# Patient Record
Sex: MALE | Race: WHITE | NOT HISPANIC OR LATINO | Employment: PART TIME | URBAN - METROPOLITAN AREA
[De-identification: names, ages, dates, MRNs, and addresses within clinical notes are randomized per-mention and may not be internally consistent; named-entity substitution may affect disease eponyms.]

---

## 2017-03-24 ENCOUNTER — HOSPITAL ENCOUNTER (OUTPATIENT)
Dept: LAB | Facility: HOSPITAL | Age: 52
Setting detail: SPECIMEN
Discharge: HOME | End: 2017-03-24
Attending: INTERNAL MEDICINE | Admitting: INTERNAL MEDICINE

## 2017-03-24 LAB
ALBUMIN SERPL-MCNC: 3.8 G/DL (ref 3.5–4.8)
ALBUMIN/GLOB SERPL: 1.2 {RATIO} (ref 1–1.7)
ALP SERPL-CCNC: 59 IU/L (ref 32–91)
ALT SERPL-CCNC: 20 IU/L (ref 17–63)
ANION GAP SERPL CALC-SCNC: 14.7 MMOL/L (ref 10–20)
AST SERPL-CCNC: 18 IU/L (ref 15–41)
BILIRUB SERPL-MCNC: 0.9 MG/DL (ref 0.3–1.2)
BUN SERPL-MCNC: 17 MG/DL (ref 8–20)
BUN/CREAT SERPL: 13.1 (ref 6.2–20.3)
CALCIUM SERPL-MCNC: 9.2 MG/DL (ref 8.9–10.3)
CHLORIDE SERPL-SCNC: 103 MMOL/L (ref 101–111)
CHOLEST SERPL-MCNC: 182 MG/DL
CHOLEST/HDLC SERPL: 2.9 {RATIO}
CONV CO2: 24 MMOL/L (ref 22–32)
CONV LDL CHOLESTEROL DIRECT: 106 MG/DL (ref 0–100)
CONV MICROALBUM.,U,RANDOM: 39 MG/L
CONV TOTAL PROTEIN: 6.9 G/DL (ref 6.1–7.9)
CREAT 24H UR-MCNC: 175.2 MG/DL
CREAT UR-MCNC: 1.3 MG/DL (ref 0.7–1.2)
GLOBULIN UR ELPH-MCNC: 3.1 G/DL (ref 2.5–3.8)
GLUCOSE SERPL-MCNC: 322 MG/DL (ref 65–99)
HDLC SERPL-MCNC: 62 MG/DL
LDLC/HDLC SERPL: 1.7 {RATIO}
LIPID INTERPRETATION: ABNORMAL
MICROALBUMIN/CREAT UR: 22.3 UG/MG
POTASSIUM SERPL-SCNC: 4.7 MMOL/L (ref 3.6–5.1)
SODIUM SERPL-SCNC: 137 MMOL/L (ref 136–144)
TRIGL SERPL-MCNC: 46 MG/DL
TSH SERPL-ACNC: 0.08 UIU/ML (ref 0.34–5.6)
VLDLC SERPL CALC-MCNC: 13.6 MG/DL

## 2017-05-15 ENCOUNTER — CONVERSION ENCOUNTER (OUTPATIENT)
Dept: CARDIOLOGY | Facility: CLINIC | Age: 52
End: 2017-05-15

## 2017-11-08 ENCOUNTER — CONVERSION ENCOUNTER (OUTPATIENT)
Dept: CARDIOLOGY | Facility: CLINIC | Age: 52
End: 2017-11-08

## 2017-11-10 ENCOUNTER — HOSPITAL ENCOUNTER (OUTPATIENT)
Dept: LAB | Facility: HOSPITAL | Age: 52
Setting detail: SPECIMEN
Discharge: HOME | End: 2017-11-10
Attending: INTERNAL MEDICINE | Admitting: INTERNAL MEDICINE

## 2017-11-10 LAB
T3FREE SERPL-MCNC: 3.81 PG/ML (ref 2.39–6.79)
T4 FREE SERPL-MCNC: 0.92 NG/DL (ref 0.58–1.64)
TSH SERPL-ACNC: 1.3 UIU/ML (ref 0.34–5.6)

## 2017-11-14 ENCOUNTER — CONVERSION ENCOUNTER (OUTPATIENT)
Dept: CARDIOLOGY | Facility: CLINIC | Age: 52
End: 2017-11-14

## 2018-01-08 ENCOUNTER — CONVERSION ENCOUNTER (OUTPATIENT)
Dept: CARDIOLOGY | Facility: CLINIC | Age: 53
End: 2018-01-08

## 2018-04-09 ENCOUNTER — CONVERSION ENCOUNTER (OUTPATIENT)
Dept: CARDIOLOGY | Facility: CLINIC | Age: 53
End: 2018-04-09

## 2018-05-18 ENCOUNTER — HOSPITAL ENCOUNTER (OUTPATIENT)
Dept: LAB | Facility: HOSPITAL | Age: 53
Setting detail: SPECIMEN
Discharge: HOME | End: 2018-05-18
Attending: INTERNAL MEDICINE | Admitting: INTERNAL MEDICINE

## 2018-05-18 LAB
ALBUMIN SERPL-MCNC: 3.6 G/DL (ref 3.5–4.8)
ALBUMIN/GLOB SERPL: 1.2 {RATIO} (ref 1–1.7)
ALP SERPL-CCNC: 64 IU/L (ref 32–91)
ALT SERPL-CCNC: 19 IU/L (ref 17–63)
ANION GAP SERPL CALC-SCNC: 12.2 MMOL/L (ref 10–20)
AST SERPL-CCNC: 17 IU/L (ref 15–41)
BILIRUB SERPL-MCNC: 0.7 MG/DL (ref 0.3–1.2)
BUN SERPL-MCNC: 14 MG/DL (ref 8–20)
BUN/CREAT SERPL: 11.7 (ref 6.2–20.3)
CALCIUM SERPL-MCNC: 8.8 MG/DL (ref 8.9–10.3)
CHLORIDE SERPL-SCNC: 104 MMOL/L (ref 101–111)
CHOLEST SERPL-MCNC: 144 MG/DL
CHOLEST/HDLC SERPL: 2.2 {RATIO}
CONV CO2: 24 MMOL/L (ref 22–32)
CONV LDL CHOLESTEROL DIRECT: 65 MG/DL (ref 0–100)
CONV MICROALBUM.,U,RANDOM: 14 MG/L
CONV TOTAL PROTEIN: 6.7 G/DL (ref 6.1–7.9)
CREAT 24H UR-MCNC: 71.8 MG/DL
CREAT UR-MCNC: 1.2 MG/DL (ref 0.7–1.2)
GLOBULIN UR ELPH-MCNC: 3.1 G/DL (ref 2.5–3.8)
GLUCOSE SERPL-MCNC: 388 MG/DL (ref 65–99)
HDLC SERPL-MCNC: 66 MG/DL
LDLC/HDLC SERPL: 1 {RATIO}
LIPID INTERPRETATION: NORMAL
MICROALBUMIN/CREAT UR: 19.5 UG/MG
POTASSIUM SERPL-SCNC: 4.2 MMOL/L (ref 3.6–5.1)
SODIUM SERPL-SCNC: 136 MMOL/L (ref 136–144)
TRIGL SERPL-MCNC: 44 MG/DL
VLDLC SERPL CALC-MCNC: 13.2 MG/DL

## 2018-10-11 ENCOUNTER — CONVERSION ENCOUNTER (OUTPATIENT)
Dept: CARDIOLOGY | Facility: CLINIC | Age: 53
End: 2018-10-11

## 2019-04-09 ENCOUNTER — CONVERSION ENCOUNTER (OUTPATIENT)
Dept: CARDIOLOGY | Facility: CLINIC | Age: 54
End: 2019-04-09

## 2019-05-16 ENCOUNTER — HOSPITAL ENCOUNTER (OUTPATIENT)
Dept: LAB | Facility: HOSPITAL | Age: 54
Setting detail: SPECIMEN
Discharge: HOME | End: 2019-05-16
Attending: INTERNAL MEDICINE | Admitting: INTERNAL MEDICINE

## 2019-05-16 LAB
ALBUMIN SERPL-MCNC: 3.9 G/DL (ref 3.5–4.8)
ALBUMIN/GLOB SERPL: 1.2 {RATIO} (ref 1–1.7)
ALP SERPL-CCNC: 61 IU/L (ref 32–91)
ALT SERPL-CCNC: 19 IU/L (ref 17–63)
ANION GAP SERPL CALC-SCNC: 14.4 MMOL/L (ref 10–20)
AST SERPL-CCNC: 17 IU/L (ref 15–41)
BILIRUB SERPL-MCNC: 0.7 MG/DL (ref 0.3–1.2)
BUN SERPL-MCNC: 13 MG/DL (ref 8–20)
BUN/CREAT SERPL: 11.8 (ref 6.2–20.3)
CALCIUM SERPL-MCNC: 9.4 MG/DL (ref 8.9–10.3)
CHLORIDE SERPL-SCNC: 103 MMOL/L (ref 101–111)
CHOLEST SERPL-MCNC: 159 MG/DL
CHOLEST/HDLC SERPL: 2.2 {RATIO}
CONV CO2: 23 MMOL/L (ref 22–32)
CONV LDL CHOLESTEROL DIRECT: 76 MG/DL (ref 0–100)
CONV MICROALBUM.,U,RANDOM: 45 MG/L
CONV TOTAL PROTEIN: 7.1 G/DL (ref 6.1–7.9)
CREAT 24H UR-MCNC: 70.8 MG/DL
CREAT UR-MCNC: 1.1 MG/DL (ref 0.7–1.2)
GLOBULIN UR ELPH-MCNC: 3.2 G/DL (ref 2.5–3.8)
GLUCOSE SERPL-MCNC: 250 MG/DL (ref 65–99)
HBA1C MFR BLD: 8.8 % (ref 0–5.6)
HDLC SERPL-MCNC: 72 MG/DL
LDLC/HDLC SERPL: 1.1 {RATIO}
LIPID INTERPRETATION: NORMAL
MICROALBUMIN/CREAT UR: 63.6 UG/MG
POTASSIUM SERPL-SCNC: 4.4 MMOL/L (ref 3.6–5.1)
SODIUM SERPL-SCNC: 136 MMOL/L (ref 136–144)
TRIGL SERPL-MCNC: 43 MG/DL
TSH SERPL-ACNC: 0.81 UIU/ML (ref 0.34–5.6)
VLDLC SERPL CALC-MCNC: 10.5 MG/DL

## 2019-06-04 VITALS
BODY MASS INDEX: 29.98 KG/M2 | SYSTOLIC BLOOD PRESSURE: 122 MMHG | BODY MASS INDEX: 30.58 KG/M2 | HEART RATE: 88 BPM | SYSTOLIC BLOOD PRESSURE: 146 MMHG | WEIGHT: 194 LBS | OXYGEN SATURATION: 99 % | BODY MASS INDEX: 31.05 KG/M2 | OXYGEN SATURATION: 97 % | OXYGEN SATURATION: 98 % | BODY MASS INDEX: 30.38 KG/M2 | WEIGHT: 193.75 LBS | HEART RATE: 81 BPM | WEIGHT: 198.25 LBS | DIASTOLIC BLOOD PRESSURE: 91 MMHG | SYSTOLIC BLOOD PRESSURE: 120 MMHG | HEART RATE: 72 BPM | HEART RATE: 90 BPM | DIASTOLIC BLOOD PRESSURE: 80 MMHG | DIASTOLIC BLOOD PRESSURE: 78 MMHG | HEART RATE: 75 BPM | BODY MASS INDEX: 29.84 KG/M2 | WEIGHT: 190.5 LBS | HEIGHT: 67 IN | DIASTOLIC BLOOD PRESSURE: 80 MMHG | SYSTOLIC BLOOD PRESSURE: 115 MMHG | HEART RATE: 90 BPM | DIASTOLIC BLOOD PRESSURE: 76 MMHG | WEIGHT: 191 LBS | WEIGHT: 200.75 LBS | DIASTOLIC BLOOD PRESSURE: 90 MMHG | SYSTOLIC BLOOD PRESSURE: 122 MMHG | DIASTOLIC BLOOD PRESSURE: 82 MMHG | OXYGEN SATURATION: 98 % | BODY MASS INDEX: 30.35 KG/M2 | BODY MASS INDEX: 31.44 KG/M2 | WEIGHT: 195.25 LBS | OXYGEN SATURATION: 99 % | SYSTOLIC BLOOD PRESSURE: 140 MMHG | OXYGEN SATURATION: 98 % | HEART RATE: 80 BPM | SYSTOLIC BLOOD PRESSURE: 142 MMHG

## 2019-08-26 RX ORDER — PEN NEEDLE, DIABETIC 32 GX 1/4"
NEEDLE, DISPOSABLE MISCELLANEOUS
Refills: 0 | OUTPATIENT
Start: 2019-08-26

## 2019-09-06 RX ORDER — PEN NEEDLE, DIABETIC 32 GX 1/4"
NEEDLE, DISPOSABLE MISCELLANEOUS
Refills: 0 | OUTPATIENT
Start: 2019-09-06

## 2019-10-14 ENCOUNTER — OFFICE VISIT (OUTPATIENT)
Dept: CARDIOLOGY | Facility: CLINIC | Age: 54
End: 2019-10-14

## 2019-10-14 VITALS
SYSTOLIC BLOOD PRESSURE: 151 MMHG | HEART RATE: 77 BPM | HEIGHT: 67 IN | WEIGHT: 195 LBS | BODY MASS INDEX: 30.61 KG/M2 | DIASTOLIC BLOOD PRESSURE: 91 MMHG | OXYGEN SATURATION: 99 %

## 2019-10-14 DIAGNOSIS — E78.5 DYSLIPIDEMIA: ICD-10-CM

## 2019-10-14 DIAGNOSIS — Z95.820 STATUS POST ANGIOPLASTY WITH STENT: Primary | ICD-10-CM

## 2019-10-14 PROCEDURE — 99213 OFFICE O/P EST LOW 20 MIN: CPT | Performed by: INTERNAL MEDICINE

## 2019-10-14 PROCEDURE — 93000 ELECTROCARDIOGRAM COMPLETE: CPT | Performed by: INTERNAL MEDICINE

## 2019-10-14 RX ORDER — DOXYCYCLINE HYCLATE 100 MG/1
CAPSULE ORAL
Refills: 0 | COMMUNITY
Start: 2019-10-12 | End: 2020-11-02

## 2019-10-14 RX ORDER — ASPIRIN 81 MG/1
TABLET ORAL
COMMUNITY
Start: 2017-11-08

## 2019-10-14 RX ORDER — CLOPIDOGREL BISULFATE 75 MG/1
TABLET ORAL DAILY
Refills: 2 | COMMUNITY
Start: 2019-10-12 | End: 2019-10-30

## 2019-10-14 RX ORDER — DEXTROMETHORPHAN HYDROBROMIDE AND PROMETHAZINE HYDROCHLORIDE 15; 6.25 MG/5ML; MG/5ML
SYRUP ORAL
Refills: 0 | COMMUNITY
Start: 2019-10-12 | End: 2019-10-30

## 2019-10-14 RX ORDER — PREDNISONE 20 MG/1
TABLET ORAL
Refills: 0 | COMMUNITY
Start: 2019-10-12 | End: 2019-10-17

## 2019-10-14 RX ORDER — ATORVASTATIN CALCIUM 10 MG/1
TABLET, FILM COATED ORAL EVERY 24 HOURS
COMMUNITY
Start: 2018-09-19 | End: 2019-10-14

## 2019-10-14 RX ORDER — CARVEDILOL 3.12 MG/1
TABLET ORAL
COMMUNITY
Start: 2018-11-07 | End: 2020-04-27

## 2019-10-14 NOTE — PROGRESS NOTES
"Encounter Date:10/14/2019      Patient ID: Colten Yanez is a 54 y.o. male.    Chief Complaint:      History of Present Illness      Assessment and Plan               No diagnosis found.LAB RESULTS (LAST 7 DAYS)    CBC        BMP        CMP         BNP        TROPONIN        CoAg        Creatinine Clearance  CrCl cannot be calculated (Patient's most recent lab result is older than the maximum 30 days allowed.).    ABG        Radiology  No radiology results for the last day                The following portions of the patient's history were reviewed and updated as appropriate: {history reviewed:20406::\"allergies\",\"current medications\",\"past family history\",\"past medical history\",\"past social history\",\"past surgical history\",\"problem list\"}.    Review of Systems   Constitution: Negative for chills, fever and malaise/fatigue.   Cardiovascular: Negative for chest pain, dyspnea on exertion, leg swelling and palpitations.   Respiratory: Negative for shortness of breath.    Skin: Negative for rash.   Neurological: Negative for dizziness, light-headedness and numbness.         Current Outpatient Medications:   •  aspirin 81 MG EC tablet, ASPIRIN 81 TBEC, Disp: , Rfl:   •  carvedilol (COREG) 3.125 MG tablet, CARVEDILOL 3.125 MG TABS, Disp: , Rfl:   •  clopidogrel (PLAVIX) 75 MG tablet, Take  by mouth Daily., Disp: , Rfl: 2  •  doxycycline (VIBRAMYCIN) 100 MG capsule, TK 1 C PO BID, Disp: , Rfl: 0  •  glucagon (GLUCAGON EMERGENCY) 1 MG injection, GLUCAGON EMERGENCY 1 MG KIT, Disp: , Rfl:   •  insulin aspart (NOVOLOG FLEXPEN) 100 UNIT/ML solution pen-injector sc pen, Inject  under the skin into the appropriate area as directed., Disp: , Rfl:   •  Insulin Pen Needle (NOVOFINE) 32G X 6 MM misc, , Disp: , Rfl:   •  predniSONE (DELTASONE) 20 MG tablet, TK 1 T PO BID FOR 5 DAYS, Disp: , Rfl: 0  •  promethazine-dextromethorphan (PROMETHAZINE-DM) 6.25-15 MG/5ML syrup, TK 5 ML PO Q 6 H PRN, Disp: , Rfl: 0    No Known " Allergies    Family History   Problem Relation Age of Onset   • Diabetes Mother    • Heart disease Mother    • Heart disease Father    • Heart disease Maternal Grandmother    • Diabetes Maternal Grandmother    • Stroke Maternal Grandmother    • Heart disease Maternal Grandfather    • Diabetes Maternal Grandfather    • Stroke Maternal Grandfather    • Stroke Paternal Grandmother    • Heart disease Paternal Grandmother    • Diabetes Paternal Grandmother    • Stroke Paternal Grandfather    • Diabetes Paternal Grandfather    • Heart disease Paternal Grandfather        Past Surgical History:   Procedure Laterality Date   • APPENDECTOMY     • CARDIAC CATHETERIZATION     • CORONARY ANGIOPLASTY WITH STENT PLACEMENT     • SINUS SURGERY     • TONSILLECTOMY         Past Medical History:   Diagnosis Date   • Asthma    • Coronary artery disease    • Diabetes 1.5, managed as type 1 (CMS/[facility])    • Hyperlipidemia        Family History   Problem Relation Age of Onset   • Diabetes Mother    • Heart disease Mother    • Heart disease Father    • Heart disease Maternal Grandmother    • Diabetes Maternal Grandmother    • Stroke Maternal Grandmother    • Heart disease Maternal Grandfather    • Diabetes Maternal Grandfather    • Stroke Maternal Grandfather    • Stroke Paternal Grandmother    • Heart disease Paternal Grandmother    • Diabetes Paternal Grandmother    • Stroke Paternal Grandfather    • Diabetes Paternal Grandfather    • Heart disease Paternal Grandfather        Social History     Socioeconomic History   • Marital status:      Spouse name: Not on file   • Number of children: Not on file   • Years of education: Not on file   • Highest education level: Not on file   Tobacco Use   • Smoking status: Never Smoker   • Smokeless tobacco: Never Used         Procedures      Objective:       Physical Exam    There were no vitals taken for this visit.  The patient is alert, oriented and in no distress.    Vital signs as noted  above.    Head and neck revealed no carotid bruits or jugular venous distension.  No thyromegaly or lymphadenopathy is present.    Lungs clear.  No wheezing.  Breath sounds are normal bilaterally.    Heart normal first and second heart sounds.  No murmur..  No pericardial rub is present.  No gallop is present.    Abdomen soft and nontender.  No organomegaly is present.    Extremities revealed good peripheral pulses without any pedal edema.    Skin warm and dry.    Musculoskeletal system is grossly normal.    CNS grossly normal.

## 2019-10-14 NOTE — PROGRESS NOTES
Encounter Date:10/14/2019  Last seen 4/9/2019      Patient ID: Colten Yanez is a 54 y.o. male.    Chief Complaint:  Follow-up  Status post stent  Dyslipidemia  Diabetes  Coronary artery disease      History of Present Illness    Since I have last seen, the patient has been without any chest discomfort ,shortness of breath, palpitations, dizziness or syncope.  Denies having any headache ,abdominal pain ,nausea, vomiting , diarrhea constipation, loss of weight or loss of appetite.  Denies having any excessive bruising ,hematuria or blood in the stool.    Review of all systems negative except as indicated  Assessment and Plan       [[[[[[[[[[[[[[[[[[[[  Impression  ==========   -Status post stent to mid LAD 11/01/2017.      Cardiac catheterization 11/01/2017 also revealed 50-60% distal RCA disease.  Normal left ventricular function with ejection fraction of 60% normal circumflex coronary artery.    -Type 1 diabetes mellitus. dyslipidemia.Cholesterol 195 LDL 95 HDL 65    -Strong family history of coronary artery disease     -Status post appendectomy     -Nonsmoker  ===========  Plan  =========  Patient is not having any angina pectoris or congestive heart failure.  EKG showed sinus rhythm without any ischemic changes  Medications were reviewed and updated.   patient is off Plavix now.  Followup in the office in 6 months  ]]]]]]]]]]]]]]]]]]         Diagnosis Plan   1. Status post angioplasty with stent  ECG 12 Lead   2. Dyslipidemia  ECG 12 Lead   LAB RESULTS (LAST 7 DAYS)    CBC        BMP        CMP         BNP        TROPONIN        CoAg        Creatinine Clearance  CrCl cannot be calculated (Patient's most recent lab result is older than the maximum 30 days allowed.).    ABG        Radiology  No radiology results for the last day                The following portions of the patient's history were reviewed and updated as appropriate: allergies, current medications, past family history, past medical history, past  social history, past surgical history and problem list.    ROS      Current Outpatient Medications:   •  aspirin 81 MG EC tablet, ASPIRIN 81 TBEC, Disp: , Rfl:   •  carvedilol (COREG) 3.125 MG tablet, CARVEDILOL 3.125 MG TABS, Disp: , Rfl:   •  clopidogrel (PLAVIX) 75 MG tablet, Take  by mouth Daily., Disp: , Rfl: 2  •  doxycycline (VIBRAMYCIN) 100 MG capsule, TK 1 C PO BID, Disp: , Rfl: 0  •  glucagon (GLUCAGON EMERGENCY) 1 MG injection, GLUCAGON EMERGENCY 1 MG KIT, Disp: , Rfl:   •  insulin aspart (NOVOLOG FLEXPEN) 100 UNIT/ML solution pen-injector sc pen, Inject  under the skin into the appropriate area as directed., Disp: , Rfl:   •  Insulin Pen Needle (NOVOFINE) 32G X 6 MM misc, , Disp: , Rfl:   •  predniSONE (DELTASONE) 20 MG tablet, TK 1 T PO BID FOR 5 DAYS, Disp: , Rfl: 0  •  promethazine-dextromethorphan (PROMETHAZINE-DM) 6.25-15 MG/5ML syrup, TK 5 ML PO Q 6 H PRN, Disp: , Rfl: 0    No Known Allergies    Family History   Problem Relation Age of Onset   • Diabetes Mother    • Heart disease Mother    • Heart disease Father    • Heart disease Maternal Grandmother    • Diabetes Maternal Grandmother    • Stroke Maternal Grandmother    • Heart disease Maternal Grandfather    • Diabetes Maternal Grandfather    • Stroke Maternal Grandfather    • Stroke Paternal Grandmother    • Heart disease Paternal Grandmother    • Diabetes Paternal Grandmother    • Stroke Paternal Grandfather    • Diabetes Paternal Grandfather    • Heart disease Paternal Grandfather        Past Surgical History:   Procedure Laterality Date   • APPENDECTOMY     • CARDIAC CATHETERIZATION     • CORONARY ANGIOPLASTY WITH STENT PLACEMENT     • SINUS SURGERY     • TONSILLECTOMY         Past Medical History:   Diagnosis Date   • Asthma    • Coronary artery disease    • Diabetes 1.5, managed as type 1 (CMS/[facility])    • Hyperlipidemia        Family History   Problem Relation Age of Onset   • Diabetes Mother    • Heart disease Mother    • Heart disease  "Father    • Heart disease Maternal Grandmother    • Diabetes Maternal Grandmother    • Stroke Maternal Grandmother    • Heart disease Maternal Grandfather    • Diabetes Maternal Grandfather    • Stroke Maternal Grandfather    • Stroke Paternal Grandmother    • Heart disease Paternal Grandmother    • Diabetes Paternal Grandmother    • Stroke Paternal Grandfather    • Diabetes Paternal Grandfather    • Heart disease Paternal Grandfather        Social History     Socioeconomic History   • Marital status:      Spouse name: Not on file   • Number of children: Not on file   • Years of education: Not on file   • Highest education level: Not on file   Tobacco Use   • Smoking status: Never Smoker   • Smokeless tobacco: Never Used           ECG 12 Lead  Date/Time: 10/14/2019 4:10 PM  Performed by: Dorcas Garza MD  Authorized by: Dorcas Garza MD   Comparison: compared with previous ECG   Comments: Normal sinus rhythm normal ECG normal axis normal intervals no ectopy nonspecific ST changes no change from 4/9/2019              Objective:       Physical Exam    /91 (BP Location: Left arm, Patient Position: Sitting, Cuff Size: Adult)   Pulse 77   Ht 170.2 cm (67\")   Wt 88.5 kg (195 lb)   SpO2 99%   BMI 30.54 kg/m²   The patient is alert, oriented and in no distress.    Vital signs as noted above.    Head and neck revealed no carotid bruits or jugular venous distension.  No thyromegaly or lymphadenopathy is present.    Lungs clear.  No wheezing.  Breath sounds are normal bilaterally.    Heart normal first and second heart sounds.  No murmur..  No pericardial rub is present.  No gallop is present.    Abdomen soft and nontender.  No organomegaly is present.    Extremities revealed good peripheral pulses without any pedal edema.    Skin warm and dry.    Musculoskeletal system is grossly normal.    CNS grossly normal.        "

## 2019-10-29 ENCOUNTER — LAB (OUTPATIENT)
Dept: LAB | Facility: HOSPITAL | Age: 54
End: 2019-10-29

## 2019-10-29 DIAGNOSIS — E10.65 TYPE 1 DIABETES MELLITUS WITH HYPERGLYCEMIA (HCC): ICD-10-CM

## 2019-10-29 DIAGNOSIS — E10.65 TYPE 1 DIABETES MELLITUS WITH HYPERGLYCEMIA (HCC): Primary | ICD-10-CM

## 2019-10-29 PROBLEM — E78.5 HYPERLIPIDEMIA: Status: ACTIVE | Noted: 2019-10-29

## 2019-10-29 PROBLEM — Z83.3 FAMILY HISTORY OF DIABETES MELLITUS: Status: ACTIVE | Noted: 2019-10-29

## 2019-10-29 PROBLEM — Z82.3 FAMILY HISTORY OF STROKE: Status: ACTIVE | Noted: 2019-10-29

## 2019-10-29 PROBLEM — Z95.5 STATUS POST CORONARY ARTERY STENT PLACEMENT: Status: ACTIVE | Noted: 2017-11-08

## 2019-10-29 PROBLEM — E11.9 DIABETES: Status: ACTIVE | Noted: 2019-10-29

## 2019-10-29 LAB — HBA1C MFR BLD: 9.6 % (ref 3.5–5.6)

## 2019-10-29 PROCEDURE — 36415 COLL VENOUS BLD VENIPUNCTURE: CPT

## 2019-10-29 PROCEDURE — 83036 HEMOGLOBIN GLYCOSYLATED A1C: CPT

## 2019-10-30 ENCOUNTER — OFFICE VISIT (OUTPATIENT)
Dept: ENDOCRINOLOGY | Facility: CLINIC | Age: 54
End: 2019-10-30

## 2019-10-30 VITALS
OXYGEN SATURATION: 98 % | DIASTOLIC BLOOD PRESSURE: 82 MMHG | HEART RATE: 77 BPM | SYSTOLIC BLOOD PRESSURE: 142 MMHG | HEIGHT: 69 IN | BODY MASS INDEX: 29.18 KG/M2 | WEIGHT: 197 LBS

## 2019-10-30 DIAGNOSIS — E78.5 HYPERLIPIDEMIA, UNSPECIFIED HYPERLIPIDEMIA TYPE: ICD-10-CM

## 2019-10-30 DIAGNOSIS — E10.49 TYPE 1 DIABETES MELLITUS WITH OTHER DIABETIC NEUROLOGICAL COMPLICATION (HCC): Primary | ICD-10-CM

## 2019-10-30 DIAGNOSIS — E55.9 VITAMIN D DEFICIENCY: ICD-10-CM

## 2019-10-30 LAB — GLUCOSE BLDC GLUCOMTR-MCNC: 124 MG/DL (ref 70–130)

## 2019-10-30 PROCEDURE — 82962 GLUCOSE BLOOD TEST: CPT | Performed by: INTERNAL MEDICINE

## 2019-10-30 PROCEDURE — 99213 OFFICE O/P EST LOW 20 MIN: CPT | Performed by: INTERNAL MEDICINE

## 2019-10-30 RX ORDER — BLOOD-GLUCOSE METER
EACH MISCELLANEOUS
Qty: 1 EACH | Refills: 0 | Status: SHIPPED | OUTPATIENT
Start: 2019-10-30 | End: 2020-07-16

## 2019-10-30 NOTE — PROGRESS NOTES
[facility]        Patient Care Team:  Provider, No Known as PCP - General  Provider, No Known as PCP - Family Medicine  Dorcas Garza MD as Consulting Physician (Cardiology)    Chief Complaint:    Chief Complaint   Patient presents with   • Diabetes     type 1         Subjective   Here for diabetes f/u  Blood sugars: did not bring records. Meter not working properly  Checks blood sugars 4x/d for the last 3 months. Adjusts insulin dose based on results  Injects insulin 4x/d for the last 6 months  Exercise program: walking @ work  Due for eye exam  Wants know more about insulin pump & CGMS    Interval History:     Patient Complaints: none  Patient Denies:  hypoglycemia  History taken from: patient    Review of Systems:   Review of Systems   Eyes: Negative for blurred vision.   Gastrointestinal: Negative for nausea.   Endocrine: Negative for polyuria.   Neurological: Negative for headache.     Gained 3 lb since last visit    Objective     Vital Signs  Heart Rate:  [77] 77  BP: (142)/(82) 142/82    Physical Exam:     General Appearance:    Alert, cooperative, in no acute distress   Head:    Normocephalic, without obvious abnormality, atraumatic   Eyes:            Lids and lashes normal, conjunctivae and sclerae normal, no   icterus, no pallor, corneas clear, PERRLA   Throat:   No oral lesions,  oral mucosa moist   Neck:   No adenopathy, supple,  no thyromegaly, no   carotid bruit   Lungs:     Clear     Heart:    Regular rhythm and normal rate   Chest Wall:    No abnormalities observed   Abdomen:     Normal bowel sounds, soft                 Extremities:   Moves all extremities well, no edema               Pulses:   Pulses palpable and equal bilaterally   Skin:   No rash   Neurologic:  DTR absent in ankles, vibratory sense 25% decreased in toes, able to feel the 10g monofilament          Results Review:    I have reviewed the patient's new clinical results, labs & imaging.    Medication Review:    Prior to Admission medications    Medication Sig Start Date End Date Taking? Authorizing Provider   aspirin 81 MG EC tablet ASPIRIN 81 TBEC 11/8/17  Yes Rylee Marie MD   carvedilol (COREG) 3.125 MG tablet CARVEDILOL 3.125 MG TABS 11/7/18  Yes Rylee Marie MD   doxycycline (VIBRAMYCIN) 100 MG capsule TK 1 C PO BID 10/12/19  Yes Rylee Marie MD   glucagon (GLUCAGON EMERGENCY) 1 MG injection GLUCAGON EMERGENCY 1 MG KIT 11/4/15  Yes Rylee Marie MD   insulin aspart (NOVOLOG FLEXPEN) 100 UNIT/ML solution pen-injector sc pen Inject  under the skin into the appropriate area as directed. Inject per sliding scale max daily dose 100 units 1/10/14  Yes Rylee Marie MD   Insulin Pen Needle (NOVOFINE) 32G X 6 MM misc Use with insulin pen 4x/d 10/30/19  Yes Beto Lee MD   Insulin Pen Needle (NOVOFINE) 32G X 6 MM misc  1/17/14 10/30/19 Yes Rylee Marie MD   clopidogrel (PLAVIX) 75 MG tablet Take  by mouth Daily. 10/12/19 10/30/19  Rylee Marie MD   promethazine-dextromethorphan (PROMETHAZINE-DM) 6.25-15 MG/5ML syrup TK 5 ML PO Q 6 H PRN 10/12/19 10/30/19  Rylee Marie MD       Lab Results (most recent)     Procedure Component Value Units Date/Time    POC Glucose Fingerstick [[id number]] Collected:  10/30/19 1317    Specimen:  Blood Updated:  10/30/19 1318     Glucose 124 mg/dL      Comment: ate@1030 am this morning, 2.5 hours ago, insulin@1030am this mroning               Lab Results   Component Value Date    HGBA1C 9.6 (H) 10/29/2019    HGBA1C 8.8 (H) 05/16/2019      Lab Results   Component Value Date    GLUCOSE 250 (H) 05/16/2019    BUN 13 05/16/2019    CREATININE 1.1 05/16/2019    BCR 11.8 05/16/2019    K 4.4 05/16/2019    CO2 23 05/16/2019    CALCIUM 9.4 05/16/2019    ALBUMIN 3.9 05/16/2019    LABIL2 1.2 05/16/2019    AST 17 05/16/2019    ALT 19 05/16/2019    CHOL 159 05/16/2019    LDL 76 05/16/2019    HDL 72 05/16/2019    TRIG 43 05/16/2019      Lab Results   Component Value Date    TSH 0.81 05/16/2019    FREET4 0.92 11/10/2017       Assessment/Plan     Colten was seen today for diabetes.    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 (CMS/[facility])  -     POC Glucose Fingerstick  -     Ambulatory Referral to Diabetic Education  -     Hemoglobin A1c; Future  -     Microalbumin / Creatinine Urine Ratio - Urine, Clean Catch; Future  -     Insulin Pen Needle (NOVOFINE) 32G X 6 MM misc; Use with insulin pen 4x/d  -     Blood Glucose Monitoring Suppl (ONE TOUCH ULTRA 2) w/Device kit; To check blood sugar 4x/d  -     ONETOUCH DELICA LANCETS FINE misc; For finger stick 4x/d  -     ONE TOUCH ULTRA TEST test strip; To check blood sugar 4 x/d    Hyperlipidemia, unspecified hyperlipidemia type  -     Comprehensive Metabolic Panel; Future  -     Lipid Panel; Future  -     TSH; Future    Vitamin D deficiency  -     Vitamin D 25 Hydroxy; Future        Schedule pump preview class.  See eye doctor.  Get flu shot as planned.  Continue exercise.  Continue meds.  Check blood sugar 4x/d & record. New One Touch meter Rx sent.  Call if blood sugars are running under 100 or over 200.        Beto Lee MD  10/30/19  7:13 PM

## 2019-10-30 NOTE — PATIENT INSTRUCTIONS
Schedule pump preview class.  See eye doctor.  Get flu shot as planned.  Continue exercise.  Continue meds.  Call if blood sugars are running under 100 or over 200.  F/u in 6 months, with fasting labs prior.

## 2019-10-31 ENCOUNTER — TELEPHONE (OUTPATIENT)
Dept: ENDOCRINOLOGY | Facility: CLINIC | Age: 54
End: 2019-10-31

## 2019-11-13 ENCOUNTER — TELEPHONE (OUTPATIENT)
Dept: ENDOCRINOLOGY | Facility: CLINIC | Age: 54
End: 2019-11-13

## 2019-11-13 NOTE — TELEPHONE ENCOUNTER
Pt called saying he was not wanting to wait for January pump preview but due to some issues he did not say what they were wanted to discuss CGM  Left vm to call Rolanda and speak to educator.

## 2019-11-18 RX ORDER — PROCHLORPERAZINE 25 MG/1
1 SUPPOSITORY RECTAL
Qty: 3 EACH | Refills: 3 | Status: SHIPPED | OUTPATIENT
Start: 2019-11-18 | End: 2020-05-04 | Stop reason: SDUPTHER

## 2019-11-18 RX ORDER — PROCHLORPERAZINE 25 MG/1
1 SUPPOSITORY RECTAL CONTINUOUS
Qty: 1 DEVICE | Refills: 0 | Status: SHIPPED | OUTPATIENT
Start: 2019-11-18

## 2019-11-18 RX ORDER — PROCHLORPERAZINE 25 MG/1
1 SUPPOSITORY RECTAL CONTINUOUS
Qty: 1 EACH | Refills: 3 | Status: SHIPPED | OUTPATIENT
Start: 2019-11-18 | End: 2020-05-04 | Stop reason: SDUPTHER

## 2019-11-18 RX ORDER — PROCHLORPERAZINE 25 MG/1
1 SUPPOSITORY RECTAL CONTINUOUS
COMMUNITY
End: 2019-11-18 | Stop reason: SDUPTHER

## 2019-11-18 RX ORDER — PROCHLORPERAZINE 25 MG/1
1 SUPPOSITORY RECTAL
COMMUNITY
End: 2019-11-18 | Stop reason: SDUPTHER

## 2019-11-18 NOTE — TELEPHONE ENCOUNTER
Pt called stating he has made up his mind to get the Dexcom G6.  Sent rx to [facility] in [place].

## 2019-11-21 ENCOUNTER — TELEPHONE (OUTPATIENT)
Dept: ENDOCRINOLOGY | Facility: CLINIC | Age: 54
End: 2019-11-21

## 2019-11-26 ENCOUNTER — TELEPHONE (OUTPATIENT)
Dept: ENDOCRINOLOGY | Facility: CLINIC | Age: 54
End: 2019-11-26

## 2019-12-09 RX ORDER — INSULIN ASPART 100 [IU]/ML
INJECTION, SOLUTION INTRAVENOUS; SUBCUTANEOUS
Qty: 90 ML | Refills: 1 | Status: SHIPPED | OUTPATIENT
Start: 2019-12-09 | End: 2020-01-06 | Stop reason: SDUPTHER

## 2019-12-19 ENCOUNTER — TELEPHONE (OUTPATIENT)
Dept: ENDOCRINOLOGY | Facility: CLINIC | Age: 54
End: 2019-12-19

## 2020-01-02 ENCOUNTER — OFFICE VISIT (OUTPATIENT)
Dept: ENDOCRINOLOGY | Facility: CLINIC | Age: 55
End: 2020-01-02

## 2020-01-02 DIAGNOSIS — E10.65 TYPE 1 DIABETES MELLITUS WITH HYPERGLYCEMIA (HCC): Primary | ICD-10-CM

## 2020-01-02 PROCEDURE — G0108 DIAB MANAGE TRN  PER INDIV: HCPCS | Performed by: DIETITIAN, REGISTERED

## 2020-01-06 RX ORDER — INSULIN ASPART 100 [IU]/ML
INJECTION, SOLUTION INTRAVENOUS; SUBCUTANEOUS
Qty: 90 ML | Refills: 3 | Status: SHIPPED | OUTPATIENT
Start: 2020-01-06 | End: 2020-05-04

## 2020-01-07 RX ORDER — INSULIN DETEMIR 100 [IU]/ML
INJECTION, SOLUTION SUBCUTANEOUS
Qty: 30 ML | Refills: 3 | Status: SHIPPED | OUTPATIENT
Start: 2020-01-07 | End: 2020-01-08 | Stop reason: CLARIF

## 2020-01-08 RX ORDER — INSULIN GLARGINE 100 [IU]/ML
INJECTION, SOLUTION SUBCUTANEOUS
Qty: 15 ML | Refills: 3 | Status: SHIPPED | OUTPATIENT
Start: 2020-01-08 | End: 2020-05-04

## 2020-01-21 ENCOUNTER — TELEPHONE (OUTPATIENT)
Dept: ENDOCRINOLOGY | Facility: CLINIC | Age: 55
End: 2020-01-21

## 2020-01-21 NOTE — TELEPHONE ENCOUNTER
Patient called back and states he was changed to lantus about 1 month ago, lantus was sent in on jan 8 th .

## 2020-01-21 NOTE — TELEPHONE ENCOUNTER
Received fax from pharmacy stating levemir is not covered by insurance. They are preferring an alternative. LVM on patient's cell phone to call insurance and call us back with the covered alternative.

## 2020-04-13 RX ORDER — CLOPIDOGREL BISULFATE 75 MG/1
TABLET ORAL
Qty: 90 TABLET | Refills: 2 | Status: SHIPPED | OUTPATIENT
Start: 2020-04-13 | End: 2021-01-13

## 2020-04-20 ENCOUNTER — TELEMEDICINE (OUTPATIENT)
Dept: CARDIOLOGY | Facility: CLINIC | Age: 55
End: 2020-04-20

## 2020-04-20 VITALS — BODY MASS INDEX: 29.18 KG/M2 | WEIGHT: 197 LBS | HEIGHT: 69 IN

## 2020-04-20 DIAGNOSIS — Z95.820 STATUS POST ANGIOPLASTY WITH STENT: Primary | ICD-10-CM

## 2020-04-20 DIAGNOSIS — E78.5 DYSLIPIDEMIA: ICD-10-CM

## 2020-04-20 DIAGNOSIS — E08.9 DIABETES MELLITUS DUE TO UNDERLYING CONDITION WITHOUT COMPLICATION, WITHOUT LONG-TERM CURRENT USE OF INSULIN (HCC): ICD-10-CM

## 2020-04-20 PROCEDURE — 99213 OFFICE O/P EST LOW 20 MIN: CPT | Performed by: INTERNAL MEDICINE

## 2020-04-20 NOTE — PROGRESS NOTES
You have chosen to receive care through a telehealth visit.  Do you consent to use a video/audio connection for your medical care today? Yes    Encounter Date:04/20/2020  Last seen 10/14/2019      Patient ID: Colten Yanez Jr. is a 55 y.o. male.    Chief Complaint:  Status post stent  Diabetes  Dyslipidemia      History of Present Illness    Since I have last seen, the patient has been without any chest discomfort ,shortness of breath, palpitations, dizziness or syncope.  Denies having any headache ,abdominal pain ,nausea, vomiting , diarrhea constipation, loss of weight or loss of appetite.  Denies having any excessive bruising ,hematuria or blood in the stool.    Review of all systems negative except as indicated  Assessment and Plan         [[[[[[[[[[[[[[[[[[[[  Impression  ==========   -Status post stent to mid LAD 11/01/2017.      Cardiac catheterization 11/01/2017 also revealed 50-60% distal RCA disease.  Normal left ventricular function with ejection fraction of 60% normal circumflex coronary artery.     -Type 1 diabetes mellitus. dyslipidemia.Cholesterol 195 LDL 95 HDL 65     -Strong family history of coronary artery disease      -Status post appendectomy      -Nonsmoker  ===========  Plan  =========  Video visit  Patient is not having any angina pectoris or congestive heart failure.  Medications were reviewed and updated.  patient is off Plavix now.  Followup in the office in 6 months  Further plan will depend on patient's progress.  ]]]]]]]]]]]]]]]]]]          Diagnosis Plan   1. Status post angioplasty with stent     2. Dyslipidemia     3. Diabetes mellitus due to underlying condition without complication, without long-term current use of insulin (CMS/[facility])     LAB RESULTS (LAST 7 DAYS)    CBC        BMP        CMP         BNP        TROPONIN        CoAg        Creatinine Clearance  CrCl cannot be calculated (Patient's most recent lab result is older than the maximum 30 days allowed.).    ABG         Radiology  No radiology results for the last day                The following portions of the patient's history were reviewed and updated as appropriate: allergies, current medications, past family history, past medical history, past social history, past surgical history and problem list.    Review of Systems   Constitution: Negative for malaise/fatigue.   Cardiovascular: Negative for chest pain, leg swelling, palpitations and syncope.   Respiratory: Negative for shortness of breath.    Skin: Negative for rash.   Gastrointestinal: Negative for nausea and vomiting.   Neurological: Negative for dizziness, light-headedness and numbness.         Current Outpatient Medications:   •  aspirin 81 MG EC tablet, ASPIRIN 81 TBEC, Disp: , Rfl:   •  Blood Glucose Monitoring Suppl (ONE TOUCH ULTRA 2) w/Device kit, To check blood sugar 4x/d, Disp: 1 each, Rfl: 0  •  carvedilol (COREG) 3.125 MG tablet, CARVEDILOL 3.125 MG TABS, Disp: , Rfl:   •  clopidogrel (PLAVIX) 75 MG tablet, TAKE 1 TABLET BY MOUTH EVERY DAY, Disp: 90 tablet, Rfl: 2  •  Continuous Blood Gluc  (DEXCOM G6 ) device, 1 each Continuous. Pt to use to monitor his blood sugars, Disp: 1 Device, Rfl: 0  •  Continuous Blood Gluc Sensor (DEXCOM G6 SENSOR), 1 each Every 10 (Ten) Days. Pt to replace every 10 days, Disp: 3 each, Rfl: 3  •  Continuous Blood Gluc Transmit (DEXCOM G6 TRANSMITTER) misc, 1 each Continuous. Pt to use to check his blood sugars, Disp: 1 each, Rfl: 3  •  doxycycline (VIBRAMYCIN) 100 MG capsule, TK 1 C PO BID, Disp: , Rfl: 0  •  glucagon (GLUCAGON EMERGENCY) 1 MG injection, GLUCAGON EMERGENCY 1 MG KIT, Disp: , Rfl:   •  Insulin Pen Needle (NOVOFINE) 32G X 6 MM misc, Use with insulin pen 4x/d, Disp: 400 each, Rfl: 4  •  LANTUS SOLOSTAR 100 UNIT/ML injection pen, Inject 20 units at bedtime dx:e10.65, Disp: 15 mL, Rfl: 3  •  NOVOLOG FLEXPEN 100 UNIT/ML solution pen-injector sc pen, Inject per sliding scale max daily dose 100 units -  Subcutaneous DX:E10.65, Disp: 90 mL, Rfl: 3  •  ONE TOUCH ULTRA TEST test strip, To check blood sugar 4 x/d, Disp: 300 each, Rfl: 4  •  ONETOUCH DELICA LANCETS FINE misc, For finger stick 4x/d, Disp: 300 each, Rfl: 4    No Known Allergies    Family History   Problem Relation Age of Onset   • Diabetes Mother    • Heart disease Mother    • Heart disease Father    • Heart disease Maternal Grandmother    • Diabetes Maternal Grandmother    • Stroke Maternal Grandmother    • Heart disease Maternal Grandfather    • Diabetes Maternal Grandfather    • Stroke Maternal Grandfather    • Stroke Paternal Grandmother    • Heart disease Paternal Grandmother    • Diabetes Paternal Grandmother    • Stroke Paternal Grandfather    • Diabetes Paternal Grandfather    • Heart disease Paternal Grandfather        Past Surgical History:   Procedure Laterality Date   • APPENDECTOMY     • CARDIAC CATHETERIZATION     • CORONARY ANGIOPLASTY WITH STENT PLACEMENT     • SINUS SURGERY     • TONSILLECTOMY         Past Medical History:   Diagnosis Date   • Asthma    • Coronary artery disease    • Diabetes 1.5, managed as type 1 (CMS/[facility])    • Hyperlipidemia        Family History   Problem Relation Age of Onset   • Diabetes Mother    • Heart disease Mother    • Heart disease Father    • Heart disease Maternal Grandmother    • Diabetes Maternal Grandmother    • Stroke Maternal Grandmother    • Heart disease Maternal Grandfather    • Diabetes Maternal Grandfather    • Stroke Maternal Grandfather    • Stroke Paternal Grandmother    • Heart disease Paternal Grandmother    • Diabetes Paternal Grandmother    • Stroke Paternal Grandfather    • Diabetes Paternal Grandfather    • Heart disease Paternal Grandfather        Social History     Socioeconomic History   • Marital status:      Spouse name: Not on file   • Number of children: Not on file   • Years of education: Not on file   • Highest education level: Not on file   Tobacco Use   • Smoking status:  "Never Smoker   • Smokeless tobacco: Former User     Types: Chew   Substance and Sexual Activity   • Alcohol use: Yes     Comment: occasionally   • Drug use: No   • Sexual activity: Defer         Procedures      Objective:       Physical Exam    Ht 175.3 cm (69\")   Wt 89.4 kg (197 lb)   BMI 29.09 kg/m²   The patient is alert, oriented and in no distress.    Vital signs as noted above.    No visible or audible shortness of breath.    No visible thyromegaly or jugular venous distention.    Speech is normal.    Skin dry without any sweating  Doing well  Okay for you you  CNS grossly normal.        "

## 2020-04-24 ENCOUNTER — LAB (OUTPATIENT)
Dept: LAB | Facility: HOSPITAL | Age: 55
End: 2020-04-24

## 2020-04-24 DIAGNOSIS — E78.5 HYPERLIPIDEMIA, UNSPECIFIED HYPERLIPIDEMIA TYPE: ICD-10-CM

## 2020-04-24 DIAGNOSIS — E55.9 VITAMIN D DEFICIENCY: ICD-10-CM

## 2020-04-24 DIAGNOSIS — E10.49 TYPE 1 DIABETES MELLITUS WITH OTHER DIABETIC NEUROLOGICAL COMPLICATION (HCC): ICD-10-CM

## 2020-04-24 LAB
25(OH)D3 SERPL-MCNC: 18.3 NG/ML (ref 30–100)
ALBUMIN SERPL-MCNC: 4 G/DL (ref 3.5–5.2)
ALBUMIN UR-MCNC: 4 MG/DL
ALBUMIN/GLOB SERPL: 1.3 G/DL
ALP SERPL-CCNC: 48 U/L (ref 39–117)
ALT SERPL W P-5'-P-CCNC: 13 U/L (ref 1–41)
ANION GAP SERPL CALCULATED.3IONS-SCNC: 8.8 MMOL/L (ref 5–15)
AST SERPL-CCNC: 12 U/L (ref 1–40)
BILIRUB SERPL-MCNC: 0.3 MG/DL (ref 0.2–1.2)
BUN BLD-MCNC: 13 MG/DL (ref 6–20)
BUN/CREAT SERPL: 12.1 (ref 7–25)
CALCIUM SPEC-SCNC: 9 MG/DL (ref 8.6–10.5)
CHLORIDE SERPL-SCNC: 97 MMOL/L (ref 98–107)
CHOLEST SERPL-MCNC: 147 MG/DL (ref 0–200)
CO2 SERPL-SCNC: 25.2 MMOL/L (ref 22–29)
CREAT BLD-MCNC: 1.07 MG/DL (ref 0.76–1.27)
CREAT UR-MCNC: 84.2 MG/DL
GFR SERPL CREATININE-BSD FRML MDRD: 72 ML/MIN/1.73
GLOBULIN UR ELPH-MCNC: 3 GM/DL
GLUCOSE BLD-MCNC: 307 MG/DL (ref 65–99)
HBA1C MFR BLD: 8.4 % (ref 3.5–5.6)
HDLC SERPL-MCNC: 65 MG/DL (ref 40–60)
LDLC SERPL CALC-MCNC: 70 MG/DL (ref 0–100)
LDLC/HDLC SERPL: 1.07 {RATIO}
MICROALBUMIN/CREAT UR: 47.5 MG/G
POTASSIUM BLD-SCNC: 4.1 MMOL/L (ref 3.5–5.2)
PROT SERPL-MCNC: 7 G/DL (ref 6–8.5)
SODIUM BLD-SCNC: 131 MMOL/L (ref 136–145)
TRIGL SERPL-MCNC: 61 MG/DL (ref 0–150)
TSH SERPL DL<=0.05 MIU/L-ACNC: 0.89 UIU/ML (ref 0.27–4.2)
VLDLC SERPL-MCNC: 12.2 MG/DL (ref 5–40)

## 2020-04-24 PROCEDURE — 82570 ASSAY OF URINE CREATININE: CPT

## 2020-04-24 PROCEDURE — 82306 VITAMIN D 25 HYDROXY: CPT

## 2020-04-24 PROCEDURE — 84443 ASSAY THYROID STIM HORMONE: CPT

## 2020-04-24 PROCEDURE — 80053 COMPREHEN METABOLIC PANEL: CPT

## 2020-04-24 PROCEDURE — 36415 COLL VENOUS BLD VENIPUNCTURE: CPT

## 2020-04-24 PROCEDURE — 82043 UR ALBUMIN QUANTITATIVE: CPT

## 2020-04-24 PROCEDURE — 80061 LIPID PANEL: CPT

## 2020-04-24 PROCEDURE — 83036 HEMOGLOBIN GLYCOSYLATED A1C: CPT

## 2020-04-27 RX ORDER — CARVEDILOL 3.12 MG/1
TABLET ORAL
Qty: 180 TABLET | Refills: 1 | Status: SHIPPED | OUTPATIENT
Start: 2020-04-27 | End: 2020-11-12

## 2020-05-04 ENCOUNTER — TELEMEDICINE (OUTPATIENT)
Dept: ENDOCRINOLOGY | Facility: CLINIC | Age: 55
End: 2020-05-04

## 2020-05-04 VITALS — HEIGHT: 69 IN | WEIGHT: 195 LBS | BODY MASS INDEX: 28.88 KG/M2

## 2020-05-04 DIAGNOSIS — E78.5 DYSLIPIDEMIA: ICD-10-CM

## 2020-05-04 DIAGNOSIS — E55.9 VITAMIN D DEFICIENCY: ICD-10-CM

## 2020-05-04 DIAGNOSIS — E10.49 TYPE 1 DIABETES MELLITUS WITH OTHER DIABETIC NEUROLOGICAL COMPLICATION (HCC): Primary | ICD-10-CM

## 2020-05-04 PROCEDURE — 99213 OFFICE O/P EST LOW 20 MIN: CPT | Performed by: INTERNAL MEDICINE

## 2020-05-04 RX ORDER — PROCHLORPERAZINE 25 MG/1
SUPPOSITORY RECTAL
Qty: 9 EACH | Refills: 3 | Status: SHIPPED | OUTPATIENT
Start: 2020-05-04

## 2020-05-04 RX ORDER — INSULIN ASPART 100 [IU]/ML
INJECTION, SOLUTION INTRAVENOUS; SUBCUTANEOUS
Qty: 90 ML | Refills: 3
Start: 2020-05-04 | End: 2021-03-11 | Stop reason: SDUPTHER

## 2020-05-04 RX ORDER — INSULIN GLARGINE 100 [IU]/ML
INJECTION, SOLUTION SUBCUTANEOUS
Qty: 15 ML | Refills: 3
Start: 2020-05-04 | End: 2020-11-02

## 2020-05-04 RX ORDER — PROCHLORPERAZINE 25 MG/1
1 SUPPOSITORY RECTAL CONTINUOUS
Qty: 3 EACH | Refills: 3 | Status: SHIPPED | OUTPATIENT
Start: 2020-05-04 | End: 2020-08-02

## 2020-05-04 RX ORDER — ERGOCALCIFEROL 1.25 MG/1
CAPSULE ORAL
Qty: 12 CAPSULE | Refills: 3 | Status: SHIPPED | OUTPATIENT
Start: 2020-05-04 | End: 2021-06-01

## 2020-05-04 NOTE — PROGRESS NOTES
[facility]        Patient Care Team:  Provider, No Known as PCP - General  Provider, No Known as PCP - Family Medicine  Dorcas Garza MD as Consulting Physician (Cardiology)    Chief Complaint:    Chief Complaint   Patient presents with   • Diabetes     TYPE 1// BS= 99 (5 HRS AFTER MEAL)         Subjective   Here for diabetes f/u  This is a telemedicine visit in view of the covid 19 pandemic, using phone only due to problems with connection  Blood sugars: higher overnight  Increased Lantus dose to 22 units in March  Using DexCom G6 since January  Exercise program: playing golf  Due for eye exam    Interval History:     Patient Complaints: none  Patient Denies:  hypoglycemia  History taken from: patient    Review of Systems:   Review of Systems   Eyes: Negative for blurred vision.   Gastrointestinal: Negative for nausea.   Endocrine: Negative for polyuria.   Neurological: Negative for headache.     Lost  2 lb since last visit    Objective     Vital Signs    There were no vitals filed for this visit.      Physical Exam: not done. eVisit          Results Review:    I have reviewed the patient's new clinical results, labs & imaging.    Medication Review:   Prior to Admission medications    Medication Sig Start Date End Date Taking? Authorizing Provider   aspirin 81 MG EC tablet ASPIRIN 81 TBEC 11/8/17  Yes ProviderRylee MD   Blood Glucose Monitoring Suppl (ONE TOUCH ULTRA 2) w/Device kit To check blood sugar 4x/d 10/30/19  Yes Beto Lee MD   carvedilol (COREG) 3.125 MG tablet TAKE 1 TABLET BY MOUTH TWICE DAILY 4/27/20  Yes Dorcas Garza MD   Continuous Blood Gluc  (DEXCOM G6 ) device 1 each Continuous. Pt to use to monitor his blood sugars 11/18/19  Yes Beto Lee MD   Continuous Blood Gluc Sensor (DEXCOM G6 SENSOR) Pt to replace every 10 days 5/4/20  Yes Beto Lee MD   Continuous Blood Gluc Transmit (DEXCOM G6 TRANSMITTER) misc 1 each  Continuous for 90 days. Pt to use to check his blood sugars 5/4/20 8/2/20 Yes Beto Lee MD   glucagon (GLUCAGON EMERGENCY) 1 MG injection GLUCAGON EMERGENCY 1 MG KIT 11/4/15  Yes Rylee Marie MD   Insulin Pen Needle (NOVOFINE) 32G X 6 MM misc Use with insulin pen 4x/d 10/30/19  Yes Beto Lee MD   LANTUS SOLOSTAR 100 UNIT/ML injection pen Inject 23 units @ supper. Dx:e10.65 5/4/20  Yes Beto Lee MD   NOVOLOG FLEXPEN 100 UNIT/ML solution pen-injector sc pen Inject tid per sliding scale max daily dose 60 units - Subcutaneous DX:E10.65 5/4/20  Yes Beto Lee MD   ONE TOUCH ULTRA TEST test strip To check blood sugar 4 x/d 10/30/19  Yes Beto Lee MD   ONETOUCH DELICA LANCETS FINE misc For finger stick 4x/d 10/30/19  Yes Beto Lee MD   Continuous Blood Gluc Sensor (DEXCOM G6 SENSOR) 1 each Every 10 (Ten) Days. Pt to replace every 10 days 11/18/19 5/4/20 Yes Beto Lee MD   Continuous Blood Gluc Transmit (DEXCOM G6 TRANSMITTER) misc 1 each Continuous. Pt to use to check his blood sugars 11/18/19 5/4/20 Yes Beto Lee MD   LANTUS SOLOSTAR 100 UNIT/ML injection pen Inject 20 units at bedtime dx:e10.65  Patient taking differently: 22 Units Every Night. Inject 20 units at bedtime dx:e10.65 1/8/20 5/4/20 Yes Beto Lee MD   NOVOLOG FLEXPEN 100 UNIT/ML solution pen-injector sc pen Inject per sliding scale max daily dose 100 units - Subcutaneous DX:E10.65  Patient taking differently: 40 Units 4 (Four) Times a Day. Inject per sliding scale max daily dose 100 units - Subcutaneous DX:E10.65 1/6/20 5/4/20 Yes Beto Lee MD   clopidogrel (PLAVIX) 75 MG tablet TAKE 1 TABLET BY MOUTH EVERY DAY 4/13/20   Dorcas Garza MD   doxycycline (VIBRAMYCIN) 100 MG capsule TK 1 C PO BID 10/12/19   Rylee Marie MD   vitamin D (ERGOCALCIFEROL) 1.25 MG (49972 UT) capsule capsule Take one weekly 5/4/20   Beto Lee MD       Lab Results  (most recent)     None            Lab Results   Component Value Date    HGBA1C 8.4 (H) 04/24/2020    HGBA1C 9.6 (H) 10/29/2019    HGBA1C 8.8 (H) 05/16/2019      Lab Results   Component Value Date    GLUCOSE 307 (H) 04/24/2020    BUN 13 04/24/2020    CREATININE 1.07 04/24/2020    EGFRIFNONA 72 04/24/2020    BCR 12.1 04/24/2020    K 4.1 04/24/2020    CO2 25.2 04/24/2020    CALCIUM 9.0 04/24/2020    ALBUMIN 4.00 04/24/2020    LABIL2 1.2 05/16/2019    AST 12 04/24/2020    ALT 13 04/24/2020    CHOL 147 04/24/2020    LDL 70 04/24/2020    HDL 65 (H) 04/24/2020    TRIG 61 04/24/2020     Lab Results   Component Value Date    TSH 0.891 04/24/2020    FREET4 0.92 11/10/2017    IKCB22WZ 18.3 (L) 04/24/2020       Assessment/Plan     Colten was seen today for diabetes.    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 Dx:e10.65  -     Continuous Blood Gluc Sensor (DEXCOM G6 SENSOR); Pt to replace every 10 days  -     Continuous Blood Gluc Transmit (DEXCOM G6 TRANSMITTER) misc; 1 each Continuous for 90 days. Pt to use to check his blood sugars    Dyslipidemia    Vitamin D deficiency  -     vitamin D (ERGOCALCIFEROL) 1.25 MG (27626 UT) capsule capsule; Take one weekly  -     Vitamin D 25 Hydroxy; Future    Improved.    Continue exercise.  See eye doctor as scheduled.  Take vit D 50,000 units once a week.  Increase Lantus to 23 units. Take it @ supper.  Call if blood sugars are running under 100 or over 200.    Spent 15 min talking to pt.      Beto Lee MD  05/04/20  16:17

## 2020-05-04 NOTE — PATIENT INSTRUCTIONS
Keep up the good work!  Continue exercise.  See eye doctor as scheduled.  Take vit D 50,000 units once a week.  Increase Lantus to 23 units. Take it @ supper.  Call if blood sugars are running under 100 or over 200.  F/u in 6 months, with labs prior.

## 2020-07-08 ENCOUNTER — TELEPHONE (OUTPATIENT)
Dept: ENDOCRINOLOGY | Facility: CLINIC | Age: 55
End: 2020-07-08

## 2020-07-08 NOTE — TELEPHONE ENCOUNTER
Sharlene called to do a Peer to Peer on patients Dexcom denial. If you are interested in doing the Peer to Peer please contact...    Medical Director- Virgil BenedictMmek-[phone]  Using EOC#-[id number]

## 2020-07-15 NOTE — TELEPHONE ENCOUNTER
Finally talked to Medical directo @ [facility].  Said had had already denied this x 2.  He sees no reason for the DexCom convenience. He can get same info by doing finger sticks.  I brought up that it would alert the pt of low blood sugar to avoid loss of conscience or death.  He was not impressed & denied again.

## 2020-07-16 DIAGNOSIS — E10.49 TYPE 1 DIABETES MELLITUS WITH OTHER DIABETIC NEUROLOGICAL COMPLICATION (HCC): ICD-10-CM

## 2020-07-16 RX ORDER — LANCETS
EACH MISCELLANEOUS
Qty: 408 EACH | Refills: 3 | Status: SHIPPED | OUTPATIENT
Start: 2020-07-16

## 2020-07-16 RX ORDER — BLOOD-GLUCOSE METER
EACH MISCELLANEOUS
Qty: 1 KIT | Refills: 0 | Status: SHIPPED | OUTPATIENT
Start: 2020-07-16

## 2020-07-16 RX ORDER — ISOPROPYL ALCOHOL 0.75 G/1
SWAB TOPICAL
Qty: 400 EACH | Refills: 3 | Status: SHIPPED | OUTPATIENT
Start: 2020-07-16

## 2020-07-16 RX ORDER — BLOOD SUGAR DIAGNOSTIC
STRIP MISCELLANEOUS
Qty: 408 EACH | Refills: 3 | Status: SHIPPED | OUTPATIENT
Start: 2020-07-16

## 2020-10-29 ENCOUNTER — LAB (OUTPATIENT)
Dept: LAB | Facility: HOSPITAL | Age: 55
End: 2020-10-29

## 2020-10-29 ENCOUNTER — TELEPHONE (OUTPATIENT)
Dept: ENDOCRINOLOGY | Facility: CLINIC | Age: 55
End: 2020-10-29

## 2020-10-29 DIAGNOSIS — E55.9 VITAMIN D DEFICIENCY: ICD-10-CM

## 2020-10-29 DIAGNOSIS — E10.49 TYPE 1 DIABETES MELLITUS WITH OTHER DIABETIC NEUROLOGICAL COMPLICATION (HCC): ICD-10-CM

## 2020-10-29 LAB
25(OH)D3 SERPL-MCNC: 48.3 NG/ML (ref 30–100)
HBA1C MFR BLD: 6.8 % (ref 3.5–5.6)

## 2020-10-29 PROCEDURE — 83036 HEMOGLOBIN GLYCOSYLATED A1C: CPT

## 2020-10-29 PROCEDURE — 82306 VITAMIN D 25 HYDROXY: CPT

## 2020-10-29 PROCEDURE — 36415 COLL VENOUS BLD VENIPUNCTURE: CPT

## 2020-11-02 ENCOUNTER — OFFICE VISIT (OUTPATIENT)
Dept: ENDOCRINOLOGY | Facility: CLINIC | Age: 55
End: 2020-11-02

## 2020-11-02 DIAGNOSIS — E10.49 TYPE 1 DIABETES MELLITUS WITH OTHER DIABETIC NEUROLOGICAL COMPLICATION (HCC): Primary | ICD-10-CM

## 2020-11-02 DIAGNOSIS — E78.5 HYPERLIPIDEMIA, UNSPECIFIED HYPERLIPIDEMIA TYPE: ICD-10-CM

## 2020-11-02 DIAGNOSIS — E55.9 VITAMIN D DEFICIENCY: ICD-10-CM

## 2020-11-02 LAB
GLUCOSE BLDC GLUCOMTR-MCNC: 174 MG/DL (ref 70–105)
GLUCOSE BLDC GLUCOMTR-MCNC: 174 MG/DL (ref 70–130)

## 2020-11-02 PROCEDURE — 99214 OFFICE O/P EST MOD 30 MIN: CPT | Performed by: INTERNAL MEDICINE

## 2020-11-02 PROCEDURE — 82962 GLUCOSE BLOOD TEST: CPT | Performed by: INTERNAL MEDICINE

## 2020-11-02 RX ORDER — INSULIN GLARGINE 100 [IU]/ML
INJECTION, SOLUTION SUBCUTANEOUS
Qty: 15 ML | Refills: 3
Start: 2020-11-02 | End: 2020-12-28

## 2020-11-02 NOTE — PATIENT INSTRUCTIONS
Keep up the good work!  Continue exercise.  Continue meds & vit D supplements.  Call if blood sugars are running under 100 or over 200.  F/u in 6 months, with fasting labs prior.

## 2020-11-07 VITALS
HEART RATE: 90 BPM | DIASTOLIC BLOOD PRESSURE: 82 MMHG | WEIGHT: 193 LBS | TEMPERATURE: 98.4 F | OXYGEN SATURATION: 99 % | SYSTOLIC BLOOD PRESSURE: 140 MMHG | BODY MASS INDEX: 28.58 KG/M2 | HEIGHT: 69 IN

## 2020-11-07 NOTE — PROGRESS NOTES
[facility]        Patient Care Team:  Provider, No Known as PCP - General  Provider, No Known as PCP - Family Medicine  Dorcas Garza MD as Consulting Physician (Cardiology)    Chief Complaint:    Chief Complaint   Patient presents with   • Diabetes     type 1         Subjective   Here for diabetes f/u  Blood sugars: in the high 100's. Using the DexCom CGMS  Exercise program: walking  Went back to school to finish his degree  Taking vit D. Helped a lot with energy  Not on a statin. Reluctant to start    Interval History:     Patient Complaints: daughter diagnosed with type 1 DM  Patient Denies:  hypoglycemia  History taken from: patient    Review of Systems:   Review of Systems   Eyes: Negative for blurred vision.   Gastrointestinal: Negative for nausea.   Endocrine: Negative for polyuria.   Neurological: Negative for headache.     Lost  2 lb since last visit    Objective     Vital Signs      Vitals:    11/02/20 1503   BP: 140/82   Pulse: 90   Temp: 98.4 °F (36.9 °C)   SpO2: 99%         Physical Exam:     General Appearance:    Alert, cooperative, in no acute distress   Head:    Normocephalic, without obvious abnormality, atraumatic   Eyes:            Lids and lashes normal, conjunctivae and sclerae normal, no   icterus, no pallor, corneas clear, PERRLA   Throat:   No oral lesions,  oral mucosa moist   Neck:   No adenopathy, supple,  no thyromegaly, no   carotid bruit   Lungs:     Clear     Heart:    Regular rhythm and normal rate   Chest Wall:    No abnormalities observed   Abdomen:     Normal bowel sounds, soft                 Extremities:   Moves all extremities well, no edema               Pulses:   Pulses palpable and equal bilaterally   Skin:   Dry   Neurologic:  DTR absent in ankles, vibratory sense 25% decreased in toes, able to feel the 10g monofilament ( same as 1y ago )            Results Review:    I have reviewed the patient's new clinical results, labs &  imaging.    Medication Review:   Prior to Admission medications    Medication Sig Start Date End Date Taking? Authorizing Provider   Accu-Chek FastClix Lancets misc TEST BLOOD GLUCOSE FOUR TIMES DAILY AS DIRECTED 7/16/20  Yes Beto Lee MD   ACCU-CHEK GUIDE test strip TEST BLOOD GLUCOSE FOUR TIMES DAILY AS DIRECTED 7/16/20  Yes Beto Lee MD   Alcohol Swabs (B-D SINGLE USE SWABS REGULAR) pads TEST BLOOD GLUCOSE FOUR TIMES DAILY AS DIRECTED 7/16/20  Yes Beto Lee MD   aspirin 81 MG EC tablet ASPIRIN 81 TBEC 11/8/17  Yes Rylee Marie MD   Blood Glucose Monitoring Suppl (ACCU-CHEK GUIDE) w/Device kit TEST FOUR TIMES DAILY AS DIRECTED 7/16/20  Yes Beto Lee MD   carvedilol (COREG) 3.125 MG tablet TAKE 1 TABLET BY MOUTH TWICE DAILY 4/27/20  Yes Dorcas Garza MD   clopidogrel (PLAVIX) 75 MG tablet TAKE 1 TABLET BY MOUTH EVERY DAY 4/13/20  Yes Dorcas Garza MD   Continuous Blood Gluc  (DEXCOM G6 ) device 1 each Continuous. Pt to use to monitor his blood sugars 11/18/19  Yes Beto Lee MD   Continuous Blood Gluc Sensor (DEXCOM G6 SENSOR) Pt to replace every 10 days 5/4/20  Yes Beto Lee MD   glucagon (GLUCAGON EMERGENCY) 1 MG injection GLUCAGON EMERGENCY 1 MG KIT 11/4/15  Yes Rylee Marie MD   Insulin Pen Needle (NOVOFINE) 32G X 6 MM misc Use with insulin pen 4x/d 10/30/19  Yes Beto Lee MD   Lantus SoloStar 100 UNIT/ML injection pen Inject 26 units @ supper. Dx:e10.65 11/2/20  Yes Beto Lee MD   Loratadine (CLARITIN PO) Take  by mouth Daily.   Yes Rylee Marie MD   NOVOLOG FLEXPEN 100 UNIT/ML solution pen-injector sc pen Inject tid per sliding scale max daily dose 60 units - Subcutaneous DX:E10.65  Patient taking differently: Inject tid per sliding scale max daily dose 30 units - Subcutaneous DX:E10.65 5/4/20  Yes Beto Lee MD   vitamin D (ERGOCALCIFEROL) 1.25 MG (69413 UT) capsule capsule Take  one weekly 5/4/20  Yes Beto Lee MD       Lab Results (most recent)     Procedure Component Value Units Date/Time    POC Glucose [[id number]]  (Abnormal) Collected: 11/02/20 1502    Specimen: Blood Updated: 11/02/20 1508     Glucose 174 mg/dL      Comment: Serial Number: [id number]:  [id number]       POC Glucose Fingerstick [[id number]]  (Abnormal) Collected: 11/02/20 1501    Specimen: Blood Updated: 11/02/20 1502     Glucose 174 mg/dL      Comment: ate@ 2p , 1 hour ago, insulin@8p last night             Lab Results   Component Value Date    HGBA1C 6.8 (H) 10/29/2020    HGBA1C 8.4 (H) 04/24/2020    HGBA1C 9.6 (H) 10/29/2019      Lab Results   Component Value Date    GLUCOSE 307 (H) 04/24/2020    BUN 13 04/24/2020    CREATININE 1.07 04/24/2020    EGFRIFNONA 72 04/24/2020    BCR 12.1 04/24/2020    K 4.1 04/24/2020    CO2 25.2 04/24/2020    CALCIUM 9.0 04/24/2020    ALBUMIN 4.00 04/24/2020    LABIL2 1.2 05/16/2019    AST 12 04/24/2020    ALT 13 04/24/2020    CHOL 147 04/24/2020    LDL 70 04/24/2020    HDL 65 (H) 04/24/2020    TRIG 61 04/24/2020     Lab Results   Component Value Date    TSH 0.891 04/24/2020    FREET4 0.92 11/10/2017    EYDJ30SH 48.3 10/29/2020       Assessment/Plan     Diagnoses and all orders for this visit:    1. 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 Dx:e10.65  Dispense: 15 mL; Refill: 3  -     Hemoglobin A1c; Future  -     Microalbumin / Creatinine Urine Ratio - Urine, Clean Catch; Future    2. Vitamin D deficiency    3. Hyperlipidemia, unspecified hyperlipidemia type  -     Comprehensive Metabolic Panel; Future  -     Lipid Panel; Future  -     TSH; Future    Other orders  -     POC Glucose    Glucose control & vit D improved. Will check lipid status next visit.    Continue exercise.  Change Lantus to 26 units @ supper.  Continue  other diabetesmeds & vit D supplements.  Call if blood sugars are running under 100 or over 200.        Beto Lee MD  11/07/20  14:17 EST

## 2020-11-12 RX ORDER — CARVEDILOL 3.12 MG/1
TABLET ORAL
Qty: 180 TABLET | Refills: 1 | Status: SHIPPED | OUTPATIENT
Start: 2020-11-12 | End: 2021-04-26

## 2020-12-28 DIAGNOSIS — E10.49 TYPE 1 DIABETES MELLITUS WITH OTHER DIABETIC NEUROLOGICAL COMPLICATION (HCC): ICD-10-CM

## 2020-12-28 RX ORDER — INSULIN GLARGINE 100 [IU]/ML
INJECTION, SOLUTION SUBCUTANEOUS
Qty: 15 ML | Refills: 3 | Status: SHIPPED | OUTPATIENT
Start: 2020-12-28 | End: 2020-12-29 | Stop reason: SDUPTHER

## 2020-12-29 ENCOUNTER — TELEPHONE (OUTPATIENT)
Dept: ENDOCRINOLOGY | Facility: CLINIC | Age: 55
End: 2020-12-29

## 2020-12-29 DIAGNOSIS — E10.49 TYPE 1 DIABETES MELLITUS WITH OTHER DIABETIC NEUROLOGICAL COMPLICATION (HCC): ICD-10-CM

## 2020-12-29 RX ORDER — INSULIN GLARGINE 100 [IU]/ML
INJECTION, SOLUTION SUBCUTANEOUS
Qty: 45 ML | Refills: 3 | Status: SHIPPED | OUTPATIENT
Start: 2020-12-29 | End: 2021-01-15 | Stop reason: CLARIF

## 2020-12-29 NOTE — TELEPHONE ENCOUNTER
Pt states he is supposed to take 30 units of lantus instead of 20, would like a new prescription sent with the updated information.

## 2021-01-13 RX ORDER — CLOPIDOGREL BISULFATE 75 MG/1
TABLET ORAL
Qty: 90 TABLET | Refills: 0 | Status: SHIPPED | OUTPATIENT
Start: 2021-01-13 | End: 2021-04-19

## 2021-01-15 RX ORDER — INSULIN DEGLUDEC INJECTION 100 U/ML
INJECTION, SOLUTION SUBCUTANEOUS
Qty: 30 ML | Refills: 3 | Status: SHIPPED | OUTPATIENT
Start: 2021-01-15 | End: 2021-03-11 | Stop reason: SDUPTHER

## 2021-01-15 NOTE — TELEPHONE ENCOUNTER
Patient's insurance does not cover Lantus. They will cover Basaglar, Levemir, or Tresiba. Which one do you want to switch him to?

## 2021-01-21 ENCOUNTER — TELEPHONE (OUTPATIENT)
Dept: ENDOCRINOLOGY | Facility: CLINIC | Age: 56
End: 2021-01-21

## 2021-01-21 NOTE — TELEPHONE ENCOUNTER
Patient's pharmacy states insurance will not cover Lantus. They will cover Basaglar, Levemir, or Tresiba. Which one would you want to switch him to?

## 2021-03-11 DIAGNOSIS — E10.49 TYPE 1 DIABETES MELLITUS WITH OTHER DIABETIC NEUROLOGICAL COMPLICATION (HCC): ICD-10-CM

## 2021-03-11 RX ORDER — INSULIN DEGLUDEC INJECTION 100 U/ML
INJECTION, SOLUTION SUBCUTANEOUS
Qty: 30 ML | Refills: 3 | Status: SHIPPED | OUTPATIENT
Start: 2021-03-11 | End: 2021-05-05

## 2021-03-11 RX ORDER — INSULIN ASPART 100 [IU]/ML
INJECTION, SOLUTION INTRAVENOUS; SUBCUTANEOUS
Qty: 90 ML | Refills: 3 | Status: SHIPPED | OUTPATIENT
Start: 2021-03-11 | End: 2022-10-28

## 2021-04-12 RX ORDER — CLOPIDOGREL BISULFATE 75 MG/1
TABLET ORAL
Qty: 90 TABLET | Refills: 0 | OUTPATIENT
Start: 2021-04-12

## 2021-04-19 RX ORDER — CLOPIDOGREL BISULFATE 75 MG/1
TABLET ORAL
Qty: 90 TABLET | Refills: 3 | Status: SHIPPED | OUTPATIENT
Start: 2021-04-19

## 2021-04-26 RX ORDER — CARVEDILOL 3.12 MG/1
3.12 TABLET ORAL 2 TIMES DAILY
Qty: 60 TABLET | Refills: 0 | Status: SHIPPED | OUTPATIENT
Start: 2021-04-26 | End: 2021-06-01

## 2021-04-30 ENCOUNTER — LAB (OUTPATIENT)
Dept: LAB | Facility: HOSPITAL | Age: 56
End: 2021-04-30

## 2021-04-30 DIAGNOSIS — E78.5 HYPERLIPIDEMIA, UNSPECIFIED HYPERLIPIDEMIA TYPE: ICD-10-CM

## 2021-04-30 DIAGNOSIS — E10.49 TYPE 1 DIABETES MELLITUS WITH OTHER DIABETIC NEUROLOGICAL COMPLICATION (HCC): ICD-10-CM

## 2021-04-30 LAB
ALBUMIN SERPL-MCNC: 4.4 G/DL (ref 3.5–5.2)
ALBUMIN UR-MCNC: 9.9 MG/DL
ALBUMIN/GLOB SERPL: 1.6 G/DL
ALP SERPL-CCNC: 55 U/L (ref 39–117)
ALT SERPL W P-5'-P-CCNC: 18 U/L (ref 1–41)
ANION GAP SERPL CALCULATED.3IONS-SCNC: 10.3 MMOL/L (ref 5–15)
AST SERPL-CCNC: 15 U/L (ref 1–40)
BILIRUB SERPL-MCNC: 0.3 MG/DL (ref 0–1.2)
BUN SERPL-MCNC: 13 MG/DL (ref 6–20)
BUN/CREAT SERPL: 12.7 (ref 7–25)
CALCIUM SPEC-SCNC: 8.5 MG/DL (ref 8.6–10.5)
CHLORIDE SERPL-SCNC: 102 MMOL/L (ref 98–107)
CHOLEST SERPL-MCNC: 144 MG/DL (ref 0–200)
CO2 SERPL-SCNC: 23.7 MMOL/L (ref 22–29)
CREAT SERPL-MCNC: 1.02 MG/DL (ref 0.76–1.27)
CREAT UR-MCNC: 132.5 MG/DL
GFR SERPL CREATININE-BSD FRML MDRD: 76 ML/MIN/1.73
GLOBULIN UR ELPH-MCNC: 2.7 GM/DL
GLUCOSE SERPL-MCNC: 117 MG/DL (ref 65–99)
HBA1C MFR BLD: 8.1 % (ref 3.5–5.6)
HDLC SERPL-MCNC: 58 MG/DL (ref 40–60)
LDLC SERPL CALC-MCNC: 76 MG/DL (ref 0–100)
LDLC/HDLC SERPL: 1.34 {RATIO}
MICROALBUMIN/CREAT UR: 74.7 MG/G
POTASSIUM SERPL-SCNC: 4 MMOL/L (ref 3.5–5.2)
PROT SERPL-MCNC: 7.1 G/DL (ref 6–8.5)
SODIUM SERPL-SCNC: 136 MMOL/L (ref 136–145)
TRIGL SERPL-MCNC: 41 MG/DL (ref 0–150)
TSH SERPL DL<=0.05 MIU/L-ACNC: 0.56 UIU/ML (ref 0.27–4.2)
VLDLC SERPL-MCNC: 10 MG/DL (ref 5–40)

## 2021-04-30 PROCEDURE — 36415 COLL VENOUS BLD VENIPUNCTURE: CPT

## 2021-04-30 PROCEDURE — 80053 COMPREHEN METABOLIC PANEL: CPT

## 2021-04-30 PROCEDURE — 82570 ASSAY OF URINE CREATININE: CPT

## 2021-04-30 PROCEDURE — 83036 HEMOGLOBIN GLYCOSYLATED A1C: CPT

## 2021-04-30 PROCEDURE — 84443 ASSAY THYROID STIM HORMONE: CPT

## 2021-04-30 PROCEDURE — 80061 LIPID PANEL: CPT

## 2021-04-30 PROCEDURE — 82043 UR ALBUMIN QUANTITATIVE: CPT

## 2021-05-05 ENCOUNTER — OFFICE VISIT (OUTPATIENT)
Dept: ENDOCRINOLOGY | Facility: CLINIC | Age: 56
End: 2021-05-05

## 2021-05-05 VITALS
DIASTOLIC BLOOD PRESSURE: 86 MMHG | WEIGHT: 199 LBS | HEART RATE: 79 BPM | TEMPERATURE: 97.3 F | HEIGHT: 69 IN | BODY MASS INDEX: 29.47 KG/M2 | SYSTOLIC BLOOD PRESSURE: 136 MMHG

## 2021-05-05 DIAGNOSIS — E78.5 DYSLIPIDEMIA: ICD-10-CM

## 2021-05-05 DIAGNOSIS — E10.65 TYPE 1 DIABETES MELLITUS WITH HYPERGLYCEMIA (HCC): Primary | ICD-10-CM

## 2021-05-05 DIAGNOSIS — E55.9 VITAMIN D DEFICIENCY: ICD-10-CM

## 2021-05-05 LAB — GLUCOSE BLDC GLUCOMTR-MCNC: 106 MG/DL (ref 70–105)

## 2021-05-05 PROCEDURE — 99214 OFFICE O/P EST MOD 30 MIN: CPT | Performed by: INTERNAL MEDICINE

## 2021-05-05 PROCEDURE — 82962 GLUCOSE BLOOD TEST: CPT | Performed by: INTERNAL MEDICINE

## 2021-05-05 RX ORDER — INSULIN DEGLUDEC INJECTION 100 U/ML
INJECTION, SOLUTION SUBCUTANEOUS
Start: 2021-05-05 | End: 2022-04-26

## 2021-05-05 RX ORDER — ATORVASTATIN CALCIUM 10 MG/1
TABLET, FILM COATED ORAL
Qty: 30 TABLET | Refills: 1 | Status: SHIPPED | OUTPATIENT
Start: 2021-05-05 | End: 2021-05-05 | Stop reason: SDUPTHER

## 2021-05-05 RX ORDER — ATORVASTATIN CALCIUM 10 MG/1
TABLET, FILM COATED ORAL
Qty: 12 TABLET | Refills: 3 | Status: SHIPPED | OUTPATIENT
Start: 2021-05-05 | End: 2022-05-08

## 2021-05-12 NOTE — PROGRESS NOTES
[facility]        Patient Care Team:  Provider, No Known as PCP - General  Provider, No Known as PCP - Family Medicine  Dorcas Garza MD as Consulting Physician (Cardiology)    Chief Complaint:    Chief Complaint   Patient presents with   • Diabetes     Type 1,  fasting         Subjective   Here for diabetes f/u  Blood sugars: low after mid morning  Exercise program: not much  Taking vit D  Due for eye exam    Interval History:     Patient Complaints: none  Patient Denies:  hpoglycemia  History taken from: patient    Review of Systems:   Review of Systems   Eyes: Negative for blurred vision.   Gastrointestinal: Negative for nausea.   Endocrine: Negative for polyuria.   Neurological: Negative for headache.     Gained 6 lb since last visit    Objective     Vital Signs      Vitals:    05/05/21 0949   BP: 136/86   Pulse: 79   Temp: 97.3 °F (36.3 °C)         Physical Exam:     General Appearance:    Alert, cooperative, in no acute distress   Head:    Normocephalic, without obvious abnormality, atraumatic   Eyes:            Lids and lashes normal, conjunctivae and sclerae normal, no   icterus, no pallor, corneas clear, PERRLA   Throat:   No oral lesions,  oral mucosa moist   Neck:   No adenopathy, supple,  no thyromegaly, no   carotid bruit   Lungs:     Clear    Heart:    Regular rhythm and normal rate   Chest Wall:    No abnormalities observed   Abdomen:     Normal bowel sounds, soft                 Extremities:   Moves all extremities well, no edema               Pulses:   Pulses palpable and equal bilaterally   Skin:   Dry   Neurologic:  DTR absent, able to feel the 10g monofilament          Results Review:    I have reviewed the patient's new clinical results, labs & imaging.    Medication Review:   Prior to Admission medications    Medication Sig Start Date End Date Taking? Authorizing Provider   Accu-Chek FastClix Lancets misc TEST BLOOD GLUCOSE FOUR TIMES DAILY AS DIRECTED 7/16/20   Yes Beto Lee MD   ACCU-CHEK GUIDE test strip TEST BLOOD GLUCOSE FOUR TIMES DAILY AS DIRECTED 7/16/20  Yes Beto Lee MD   Alcohol Swabs (B-D SINGLE USE SWABS REGULAR) pads TEST BLOOD GLUCOSE FOUR TIMES DAILY AS DIRECTED 7/16/20  Yes Beto Lee MD   aspirin 81 MG EC tablet ASPIRIN 81 TBEC 11/8/17  Yes Rylee Marie MD   Blood Glucose Monitoring Suppl (ACCU-CHEK GUIDE) w/Device kit TEST FOUR TIMES DAILY AS DIRECTED 7/16/20  Yes Beto Lee MD   carvedilol (COREG) 3.125 MG tablet Take 1 tablet by mouth 2 (Two) Times a Day. PT MUST CALL MD OFFICE TO MAKE AND KEEP APPOINTMENT FOR FURTHER REFILLS 4/26/21  Yes Dorcas Garza MD   clopidogrel (PLAVIX) 75 MG tablet TAKE 1 TABLET BY MOUTH EVERY DAY 4/19/21  Yes Dorcas Garza MD   Continuous Blood Gluc  (DEXCOM G6 ) device 1 each Continuous. Pt to use to monitor his blood sugars 11/18/19  Yes Beto Lee MD   Continuous Blood Gluc Sensor (DEXCOM G6 SENSOR) Pt to replace every 10 days 5/4/20  Yes Beto Lee MD   glucagon (GLUCAGON EMERGENCY) 1 MG injection GLUCAGON EMERGENCY 1 MG KIT 11/4/15  Yes Rylee Marie MD   insulin degludec (Tresiba FlexTouch) 100 UNIT/ML solution pen-injector injection Inject 28 units under the skin every night. 5/5/21  Yes Beto Lee MD   Insulin Pen Needle (NOVOFINE) 32G X 6 MM misc Use with insulin pen 4x/d 10/30/19  Yes Beto Lee MD   Loratadine (CLARITIN PO) Take  by mouth Daily.   Yes Rylee Marie MD   NovoLOG FlexPen 100 UNIT/ML solution pen-injector sc pen Inject tid per sliding scale max daily dose 60 units - Subcutaneous DX:E10.65 3/11/21  Yes Beto Lee MD   vitamin D (ERGOCALCIFEROL) 1.25 MG (92692 UT) capsule capsule Take one weekly 5/4/20  Yes Beto Lee MD   atorvastatin (Lipitor) 10 MG tablet Take one tab weekly. 5/5/21   Beto Lee MD       Lab Results (most recent)     Procedure Component Value Units  Date/Time    POC Glucose [[id number]]  (Abnormal) Collected: 05/05/21 0953    Specimen: Blood Updated: 05/05/21 0954     Glucose 106 mg/dL      Comment: Serial Number: [id number]:  [id number]           Lab Results   Component Value Date    HGBA1C 8.1 (H) 04/30/2021    HGBA1C 6.8 (H) 10/29/2020    HGBA1C 8.4 (H) 04/24/2020      Lab Results   Component Value Date    GLUCOSE 117 (H) 04/30/2021    BUN 13 04/30/2021    CREATININE 1.02 04/30/2021    EGFRIFNONA 76 04/30/2021    BCR 12.7 04/30/2021    K 4.0 04/30/2021    CO2 23.7 04/30/2021    CALCIUM 8.5 (L) 04/30/2021    ALBUMIN 4.40 04/30/2021    LABIL2 1.2 05/16/2019    AST 15 04/30/2021    ALT 18 04/30/2021    CHOL 144 04/30/2021    LDL 76 04/30/2021    HDL 58 04/30/2021    TRIG 41 04/30/2021     Lab Results   Component Value Date    TSH 0.556 04/30/2021    FREET4 0.92 11/10/2017    [id number] 48.3 10/29/2020     Microalb/cr ratio 74.7    Assessment/Plan     Diagnoses and all orders for this visit:    1. Type 1 diabetes mellitus with hyperglycemia (CMS/[facility]) (Primary)  -     insulin degludec (Tresiba FlexTouch) 100 UNIT/ML solution pen-injector injection; Inject 28 units under the skin every night.  -     Hemoglobin A1c; Future    2. Vitamin D deficiency  -     Vitamin D 25 Hydroxy; Future    3. Dyslipidemia  -     Discontinue: atorvastatin (Lipitor) 10 MG tablet; Take one tab weekly.  Dispense: 30 tablet; Refill: 1    Other orders  -     POC Glucose    Glucose control worse. Lipids stable. Will check vit D status next visit.    Increase exercise as planned.  See eye doctor.  Decrease Tresiba to 28 units @ supper.  Increase supper Novolog to 10 units.  Continue vit D supplements.  Start atorvastatin 10 mg one day per week.  Call if blood sugars are running under 100 or over 200.        Beto Lee MD  05/11/21  20:21 EDT

## 2021-05-31 DIAGNOSIS — E55.9 VITAMIN D DEFICIENCY: ICD-10-CM

## 2021-06-01 RX ORDER — CARVEDILOL 3.12 MG/1
TABLET ORAL
Qty: 60 TABLET | Refills: 0 | Status: SHIPPED | OUTPATIENT
Start: 2021-06-01 | End: 2021-07-19

## 2021-06-01 RX ORDER — ERGOCALCIFEROL 1.25 MG/1
CAPSULE ORAL
Qty: 12 CAPSULE | Refills: 3 | Status: SHIPPED | OUTPATIENT
Start: 2021-06-01 | End: 2022-04-26

## 2021-06-23 RX ORDER — CARVEDILOL 3.12 MG/1
TABLET ORAL
Qty: 60 TABLET | Refills: 0 | OUTPATIENT
Start: 2021-06-23

## 2021-06-23 NOTE — TELEPHONE ENCOUNTER
Patient last visit was a telephone visit on 4/20/2020 and was to follow-up in 6 months.     No further appointments scheduled. Unable to refill medications until patient is seen in the office for a follow-up.

## 2021-07-19 RX ORDER — CARVEDILOL 3.12 MG/1
TABLET ORAL
Qty: 60 TABLET | Refills: 0 | Status: SHIPPED | OUTPATIENT
Start: 2021-07-19 | End: 2021-11-01 | Stop reason: SDUPTHER

## 2021-07-23 ENCOUNTER — TELEPHONE (OUTPATIENT)
Dept: ENDOCRINOLOGY | Facility: CLINIC | Age: 56
End: 2021-07-23

## 2021-08-10 RX ORDER — CARVEDILOL 3.12 MG/1
TABLET ORAL
Qty: 60 TABLET | Refills: 0 | OUTPATIENT
Start: 2021-08-10

## 2021-09-01 RX ORDER — CARVEDILOL 3.12 MG/1
TABLET ORAL
Qty: 60 TABLET | Refills: 0 | OUTPATIENT
Start: 2021-09-01

## 2021-09-01 NOTE — TELEPHONE ENCOUNTER
Rx Refill Note  Requested Prescriptions     Pending Prescriptions Disp Refills   • carvedilol (COREG) 3.125 MG tablet [Pharmacy Med Name: CARVEDILOL 3.125 MG TABLET] 60 tablet 0     Sig: TAKE 1 TABLET BY MOUTH TWICE A DAY *NEEDS APPT*      Last office visit with prescribing clinician: 10/14/2019      Next office visit with prescribing clinician: Visit date not found            Deepti Pelaez MA  09/01/21, 10:51 EDT

## 2021-10-25 ENCOUNTER — LAB (OUTPATIENT)
Dept: LAB | Facility: HOSPITAL | Age: 56
End: 2021-10-25

## 2021-10-25 DIAGNOSIS — E55.9 VITAMIN D DEFICIENCY: ICD-10-CM

## 2021-10-25 DIAGNOSIS — E10.65 TYPE 1 DIABETES MELLITUS WITH HYPERGLYCEMIA (HCC): ICD-10-CM

## 2021-10-25 LAB
25(OH)D3 SERPL-MCNC: 53.5 NG/ML
HBA1C MFR BLD: 7.8 % (ref 3.5–5.6)

## 2021-10-25 PROCEDURE — 82306 VITAMIN D 25 HYDROXY: CPT

## 2021-10-25 PROCEDURE — 83036 HEMOGLOBIN GLYCOSYLATED A1C: CPT

## 2021-10-25 PROCEDURE — 36415 COLL VENOUS BLD VENIPUNCTURE: CPT

## 2021-10-25 RX ORDER — CARVEDILOL 3.12 MG/1
TABLET ORAL
Qty: 60 TABLET | Refills: 0 | OUTPATIENT
Start: 2021-10-25

## 2021-10-25 NOTE — TELEPHONE ENCOUNTER
Rx Refill Note  Requested Prescriptions     Pending Prescriptions Disp Refills   • carvedilol (COREG) 3.125 MG tablet [Pharmacy Med Name: CARVEDILOL 3.125 MG TABLET] 60 tablet 0     Sig: TAKE 1 TABLET BY MOUTH TWICE A DAY *NEEDS APPT*      Last office visit with prescribing clinician: 4/20/2020-TELEMEDICINE VISIT      Next office visit with prescribing clinician: Visit date not found            Kalyani Perez MA  10/25/21, 10:03 EDT     DENIED - PATIENT NEEDS APPOINTMENT

## 2021-10-26 ENCOUNTER — TELEPHONE (OUTPATIENT)
Dept: ENDOCRINOLOGY | Facility: CLINIC | Age: 56
End: 2021-10-26

## 2021-10-27 RX ORDER — CARVEDILOL 3.12 MG/1
TABLET ORAL
Qty: 60 TABLET | Refills: 0 | OUTPATIENT
Start: 2021-10-27

## 2021-11-01 ENCOUNTER — OFFICE VISIT (OUTPATIENT)
Dept: ENDOCRINOLOGY | Facility: CLINIC | Age: 56
End: 2021-11-01

## 2021-11-01 VITALS
DIASTOLIC BLOOD PRESSURE: 90 MMHG | HEIGHT: 69 IN | BODY MASS INDEX: 30.96 KG/M2 | HEART RATE: 77 BPM | WEIGHT: 209 LBS | SYSTOLIC BLOOD PRESSURE: 140 MMHG | TEMPERATURE: 97.5 F

## 2021-11-01 DIAGNOSIS — E55.9 VITAMIN D DEFICIENCY: ICD-10-CM

## 2021-11-01 DIAGNOSIS — E78.5 DYSLIPIDEMIA: ICD-10-CM

## 2021-11-01 DIAGNOSIS — E10.65 TYPE 1 DIABETES MELLITUS WITH HYPERGLYCEMIA (HCC): Primary | ICD-10-CM

## 2021-11-01 PROCEDURE — 99214 OFFICE O/P EST MOD 30 MIN: CPT | Performed by: INTERNAL MEDICINE

## 2021-11-01 RX ORDER — CARVEDILOL 3.12 MG/1
TABLET ORAL
Qty: 180 TABLET | Refills: 3 | Status: SHIPPED | OUTPATIENT
Start: 2021-11-01 | End: 2023-01-30

## 2021-11-01 NOTE — PATIENT INSTRUCTIONS
Keep up the good work!  Continue exercise.  Continue diabetes & chol meds & vit D supplements.  Call if blood sugars are running under 100 or over 200.  F/u in 6 months, with fasting labs prior.

## 2021-11-13 NOTE — PROGRESS NOTES
[facility]        Patient Care Team:  Provider, No Known as PCP - General  Provider, No Known as PCP - Family Medicine  Dorcas Garza MD as Consulting Physician (Cardiology)    Chief Complaint:    Chief Complaint   Patient presents with   • Diabetes     follow up, Type 1,  coffe @ 9:30am, Tresiba 26 units Novolog 12units last night         Subjective   Here for diabetes f/u  Blood sugars: higher late night. Using DexCom CGMS  Exercise program: walking @ work, playing golf  Taking vit D    Interval History:     Patient Complaints: ran out of carvedilol  Patient Denies:  hypoglycemia  History taken from: patient    Review of Systems:   Review of Systems   Eyes: Negative for blurred vision.   Gastrointestinal: Negative for nausea.   Endocrine: Negative for polyuria.   Neurological: Negative for headache.     Gained 10 lb since last visit    Objective     Vital Signs      Vitals:    11/01/21 1118   BP: 140/90   Pulse: 77   Temp: 97.5 °F (36.4 °C)         Physical Exam:     General Appearance:    Alert, cooperative, in no acute distress   Head:    Normocephalic, without obvious abnormality, atraumatic   Eyes:            Lids and lashes normal, conjunctivae and sclerae normal, no   icterus, no pallor, corneas clear, PERRLA   Throat:   No oral lesions,  oral mucosa moist   Neck:   No adenopathy, supple,  no thyromegaly, no   carotid bruit   Lungs:     Clear     Heart:    Regular rhythm and normal rate   Chest Wall:    No abnormalities observed   Abdomen:     Normal bowel sounds, soft                 Extremities:   Moves all extremities well, no edema               Pulses:   Pulses palpable and equal bilaterally   Skin:   Dry   Neurologic:  DTR absent in ankles, vibratory sense 25% decreased in toes, able to feel the 10g monofilament ( same as 1y ago )            Results Review:    I have reviewed the patient's new clinical results, labs & imaging.    Medication Review:   Prior to Admission  medications    Medication Sig Start Date End Date Taking? Authorizing Provider   Accu-Chek FastClix Lancets misc TEST BLOOD GLUCOSE FOUR TIMES DAILY AS DIRECTED 7/16/20  Yes Beto Lee MD   ACCU-CHEK GUIDE test strip TEST BLOOD GLUCOSE FOUR TIMES DAILY AS DIRECTED 7/16/20  Yes Beto Lee MD   Alcohol Swabs (B-D SINGLE USE SWABS REGULAR) pads TEST BLOOD GLUCOSE FOUR TIMES DAILY AS DIRECTED 7/16/20  Yes Beto Lee MD   aspirin 81 MG EC tablet ASPIRIN 81 TBEC 11/8/17  Yes Rylee Marie MD   atorvastatin (Lipitor) 10 MG tablet Take one tab weekly. 5/5/21  Yes Beto Lee MD   Blood Glucose Monitoring Suppl (ACCU-CHEK GUIDE) w/Device kit TEST FOUR TIMES DAILY AS DIRECTED 7/16/20  Yes Beto Lee MD   carvedilol (COREG) 3.125 MG tablet Take one bid. 11/1/21  Yes Beto Lee MD   clopidogrel (PLAVIX) 75 MG tablet TAKE 1 TABLET BY MOUTH EVERY DAY 4/19/21  Yes Dorcas Garza MD   Continuous Blood Gluc  (DEXCOM G6 ) device 1 each Continuous. Pt to use to monitor his blood sugars 11/18/19  Yes Beto Lee MD   Continuous Blood Gluc Sensor (DEXCOM G6 SENSOR) Pt to replace every 10 days 5/4/20  Yes Beto Lee MD   glucagon (GLUCAGON EMERGENCY) 1 MG injection GLUCAGON EMERGENCY 1 MG KIT 11/4/15  Yes Rylee Marie MD   insulin degludec (Tresiba FlexTouch) 100 UNIT/ML solution pen-injector injection Inject 28 units under the skin every night. 5/5/21  Yes Beto Lee MD   Insulin Pen Needle (NOVOFINE) 32G X 6 MM misc Use with insulin pen 4x/d 10/30/19  Yes Beto Lee MD   Loratadine (CLARITIN PO) Take  by mouth Daily.   Yes Rylee Marie MD   NovoLOG FlexPen 100 UNIT/ML solution pen-injector sc pen Inject tid per sliding scale max daily dose 60 units - Subcutaneous DX:E10.65 3/11/21  Yes Beto Lee MD   vitamin D (ERGOCALCIFEROL) 1.25 MG (87639 UT) capsule capsule TAKE ONE CAPSULE BY MOUTH WEEKLY 6/1/21  Yes  Beto Lee MD       Lab Results (most recent)     None        Lab Results   Component Value Date    HGBA1C 7.8 (H) 10/25/2021    HGBA1C 8.1 (H) 04/30/2021    HGBA1C 6.8 (H) 10/29/2020      Lab Results   Component Value Date    GLUCOSE 117 (H) 04/30/2021    BUN 13 04/30/2021    CREATININE 1.02 04/30/2021    EGFRIFNONA 76 04/30/2021    BCR 12.7 04/30/2021    K 4.0 04/30/2021    CO2 23.7 04/30/2021    CALCIUM 8.5 (L) 04/30/2021    ALBUMIN 4.40 04/30/2021    LABIL2 1.2 05/16/2019    AST 15 04/30/2021    ALT 18 04/30/2021    CHOL 144 04/30/2021    LDL 76 04/30/2021    HDL 58 04/30/2021    TRIG 41 04/30/2021     Lab Results   Component Value Date    TSH 0.556 04/30/2021    FREET4 0.92 11/10/2017    QFXX63MI 53.5 10/25/2021       Assessment/Plan     Diagnoses and all orders for this visit:    1. Type 1 diabetes mellitus with hyperglycemia (HCC) (Primary)  -     Hemoglobin A1c; Future  -     Microalbumin / Creatinine Urine Ratio - Urine, Clean Catch; Future    2. Vitamin D deficiency    3. Dyslipidemia  -     Comprehensive Metabolic Panel; Future  -     Lipid Panel; Future  -     TSH; Future    Other orders  -     carvedilol (COREG) 3.125 MG tablet; Take one bid.  Dispense: 180 tablet; Refill: 3    Glucose control improved. Vit D stable. Will check lipid status next visit.    Continue exercise.  Continue diabetes & chol meds & vit D supplements.  Call if blood sugars are running under 100 or over 200.        Beto Lee MD  11/12/21  22:54 EST

## 2022-04-20 ENCOUNTER — TELEPHONE (OUTPATIENT)
Dept: ENDOCRINOLOGY | Facility: CLINIC | Age: 57
End: 2022-04-20

## 2022-04-22 ENCOUNTER — TELEPHONE (OUTPATIENT)
Dept: ENDOCRINOLOGY | Facility: CLINIC | Age: 57
End: 2022-04-22

## 2022-04-25 ENCOUNTER — LAB (OUTPATIENT)
Dept: LAB | Facility: HOSPITAL | Age: 57
End: 2022-04-25

## 2022-04-25 DIAGNOSIS — E10.65 TYPE 1 DIABETES MELLITUS WITH HYPERGLYCEMIA: ICD-10-CM

## 2022-04-25 DIAGNOSIS — E78.5 DYSLIPIDEMIA: ICD-10-CM

## 2022-04-25 LAB
ALBUMIN SERPL-MCNC: 4.4 G/DL (ref 3.5–5.2)
ALBUMIN UR-MCNC: 5.4 MG/DL
ALBUMIN/GLOB SERPL: 1.8 G/DL
ALP SERPL-CCNC: 54 U/L (ref 39–117)
ALT SERPL W P-5'-P-CCNC: 17 U/L (ref 1–41)
ANION GAP SERPL CALCULATED.3IONS-SCNC: 9.5 MMOL/L (ref 5–15)
AST SERPL-CCNC: 15 U/L (ref 1–40)
BILIRUB SERPL-MCNC: 0.3 MG/DL (ref 0–1.2)
BUN SERPL-MCNC: 14 MG/DL (ref 6–20)
BUN/CREAT SERPL: 13.6 (ref 7–25)
CALCIUM SPEC-SCNC: 8.9 MG/DL (ref 8.6–10.5)
CHLORIDE SERPL-SCNC: 104 MMOL/L (ref 98–107)
CHOLEST SERPL-MCNC: 156 MG/DL (ref 0–200)
CO2 SERPL-SCNC: 24.5 MMOL/L (ref 22–29)
CREAT SERPL-MCNC: 1.03 MG/DL (ref 0.76–1.27)
CREAT UR-MCNC: 64.7 MG/DL
EGFRCR SERPLBLD CKD-EPI 2021: 85.8 ML/MIN/1.73
GLOBULIN UR ELPH-MCNC: 2.4 GM/DL
GLUCOSE SERPL-MCNC: 197 MG/DL (ref 65–99)
HBA1C MFR BLD: 8 % (ref 3.5–5.6)
HDLC SERPL-MCNC: 52 MG/DL (ref 40–60)
LDLC SERPL CALC-MCNC: 95 MG/DL (ref 0–100)
LDLC/HDLC SERPL: 1.85 {RATIO}
MICROALBUMIN/CREAT UR: 83.5 MG/G
POTASSIUM SERPL-SCNC: 4.7 MMOL/L (ref 3.5–5.2)
PROT SERPL-MCNC: 6.8 G/DL (ref 6–8.5)
SODIUM SERPL-SCNC: 138 MMOL/L (ref 136–145)
TRIGL SERPL-MCNC: 40 MG/DL (ref 0–150)
TSH SERPL DL<=0.05 MIU/L-ACNC: 0.56 UIU/ML (ref 0.27–4.2)
VLDLC SERPL-MCNC: 9 MG/DL (ref 5–40)

## 2022-04-25 PROCEDURE — 80053 COMPREHEN METABOLIC PANEL: CPT

## 2022-04-25 PROCEDURE — 83036 HEMOGLOBIN GLYCOSYLATED A1C: CPT

## 2022-04-25 PROCEDURE — 82043 UR ALBUMIN QUANTITATIVE: CPT

## 2022-04-25 PROCEDURE — 82570 ASSAY OF URINE CREATININE: CPT

## 2022-04-25 PROCEDURE — 80061 LIPID PANEL: CPT

## 2022-04-25 PROCEDURE — 36415 COLL VENOUS BLD VENIPUNCTURE: CPT

## 2022-04-25 PROCEDURE — 84443 ASSAY THYROID STIM HORMONE: CPT

## 2022-04-26 DIAGNOSIS — E55.9 VITAMIN D DEFICIENCY: ICD-10-CM

## 2022-04-26 DIAGNOSIS — E10.65 TYPE 1 DIABETES MELLITUS WITH HYPERGLYCEMIA: ICD-10-CM

## 2022-04-26 RX ORDER — INSULIN DEGLUDEC INJECTION 100 U/ML
INJECTION, SOLUTION SUBCUTANEOUS
Qty: 30 ML | Refills: 0 | Status: SHIPPED | OUTPATIENT
Start: 2022-04-26 | End: 2022-07-27

## 2022-04-26 RX ORDER — CLOPIDOGREL BISULFATE 75 MG/1
TABLET ORAL
Qty: 90 TABLET | Refills: 3 | OUTPATIENT
Start: 2022-04-26

## 2022-04-26 RX ORDER — ERGOCALCIFEROL 1.25 MG/1
CAPSULE ORAL
Qty: 12 CAPSULE | Refills: 3 | Status: SHIPPED | OUTPATIENT
Start: 2022-04-26

## 2022-04-26 NOTE — TELEPHONE ENCOUNTER
Rx Refill Note  Requested Prescriptions     Refused Prescriptions Disp Refills   • clopidogrel (PLAVIX) 75 MG tablet [Pharmacy Med Name: CLOPIDOGREL 75 MG TABLET] 90 tablet 3     Sig: TAKE 1 TABLET BY MOUTH EVERY DAY      Last office visit with prescribing clinician: Visit date not found      Next office visit with prescribing clinician: Visit date not found            Joanna Horan MA  04/26/22, 14:38 EDT

## 2022-05-02 PROCEDURE — 82962 GLUCOSE BLOOD TEST: CPT | Performed by: INTERNAL MEDICINE

## 2022-05-02 RX ORDER — CLOPIDOGREL BISULFATE 75 MG/1
TABLET ORAL
Qty: 90 TABLET | Refills: 3 | OUTPATIENT
Start: 2022-05-02

## 2022-05-02 NOTE — TELEPHONE ENCOUNTER
Rx Refill Note  Requested Prescriptions     Refused Prescriptions Disp Refills   • clopidogrel (PLAVIX) 75 MG tablet [Pharmacy Med Name: CLOPIDOGREL 75 MG TABLET] 90 tablet 3     Sig: TAKE 1 TABLET BY MOUTH EVERY DAY      Last office visit with prescribing clinician: Visit date not found      Next office visit with prescribing clinician: Visit date not found            Joanna Horan MA  05/02/22, 09:29 EDT

## 2022-07-27 DIAGNOSIS — E10.65 TYPE 1 DIABETES MELLITUS WITH HYPERGLYCEMIA: ICD-10-CM

## 2022-07-27 RX ORDER — INSULIN DEGLUDEC INJECTION 100 U/ML
INJECTION, SOLUTION SUBCUTANEOUS
Qty: 15 ML | Refills: 0 | Status: SHIPPED | OUTPATIENT
Start: 2022-07-27 | End: 2022-10-28

## 2022-10-28 DIAGNOSIS — E10.65 TYPE 1 DIABETES MELLITUS WITH HYPERGLYCEMIA: ICD-10-CM

## 2022-10-28 DIAGNOSIS — E10.49 TYPE 1 DIABETES MELLITUS WITH OTHER DIABETIC NEUROLOGICAL COMPLICATION: ICD-10-CM

## 2022-10-28 RX ORDER — INSULIN DEGLUDEC INJECTION 100 U/ML
INJECTION, SOLUTION SUBCUTANEOUS
Qty: 9 ML | Refills: 0 | Status: SHIPPED | OUTPATIENT
Start: 2022-10-28 | End: 2022-12-28

## 2022-10-28 RX ORDER — CARVEDILOL 3.12 MG/1
TABLET ORAL
Qty: 60 TABLET | Refills: 0 | OUTPATIENT
Start: 2022-10-28

## 2022-10-28 RX ORDER — INSULIN ASPART 100 [IU]/ML
INJECTION, SOLUTION INTRAVENOUS; SUBCUTANEOUS
Qty: 60 ML | Refills: 0 | Status: SHIPPED | OUTPATIENT
Start: 2022-10-28

## 2022-11-08 ENCOUNTER — TELEPHONE (OUTPATIENT)
Dept: ENDOCRINOLOGY | Facility: CLINIC | Age: 57
End: 2022-11-08

## 2022-11-08 NOTE — TELEPHONE ENCOUNTER
Recv'd PO from [facility], but no upcoming appt scheduled. LVM for pt to call back Rolanda. Will need tor schedule MD visit to complete ppw

## 2022-12-27 DIAGNOSIS — E10.65 TYPE 1 DIABETES MELLITUS WITH HYPERGLYCEMIA: ICD-10-CM

## 2022-12-28 RX ORDER — INSULIN DEGLUDEC INJECTION 100 U/ML
INJECTION, SOLUTION SUBCUTANEOUS
Qty: 9 ML | Refills: 3 | Status: SHIPPED | OUTPATIENT
Start: 2022-12-28

## 2023-01-30 RX ORDER — CARVEDILOL 3.12 MG/1
TABLET ORAL
Qty: 30 TABLET | Refills: 0 | Status: SHIPPED | OUTPATIENT
Start: 2023-01-30

## 2023-02-07 RX ORDER — CARVEDILOL 3.12 MG/1
TABLET ORAL
Qty: 30 TABLET | Refills: 0 | OUTPATIENT
Start: 2023-02-07

## 2023-04-19 DIAGNOSIS — E55.9 VITAMIN D DEFICIENCY: ICD-10-CM

## 2023-04-20 RX ORDER — ERGOCALCIFEROL 1.25 MG/1
CAPSULE ORAL
Qty: 12 CAPSULE | Refills: 3 | Status: SHIPPED | OUTPATIENT
Start: 2023-04-20

## 2023-07-23 DIAGNOSIS — E10.49 TYPE 1 DIABETES MELLITUS WITH OTHER DIABETIC NEUROLOGICAL COMPLICATION: ICD-10-CM

## 2023-07-24 RX ORDER — INSULIN ASPART 100 [IU]/ML
INJECTION, SOLUTION INTRAVENOUS; SUBCUTANEOUS
Qty: 18 ML | Refills: 3 | Status: SHIPPED | OUTPATIENT
Start: 2023-07-24

## 2023-08-16 ENCOUNTER — TELEPHONE (OUTPATIENT)
Dept: ENDOCRINOLOGY | Facility: CLINIC | Age: 58
End: 2023-08-16

## 2023-08-16 NOTE — TELEPHONE ENCOUNTER
Caller: Colten Yanez Jr.    Relationship: Self    Best call back number: [phone]    Who is your current provider: JOLLY    Who would you like your new provider to be: CHADD IGLESIAS, FIRST AVAILABLE    What are your reasons for transferring care: MOVING BACK TO [place]    Additional notes: PT IS A FORMER PT JON BEFORE PT MOVED TO [place]

## 2023-09-25 ENCOUNTER — LAB (OUTPATIENT)
Dept: LAB | Facility: HOSPITAL | Age: 58
End: 2023-09-25
Payer: COMMERCIAL

## 2023-09-25 DIAGNOSIS — E10.49 TYPE 1 DIABETES MELLITUS WITH OTHER DIABETIC NEUROLOGICAL COMPLICATION: Primary | ICD-10-CM

## 2023-09-25 DIAGNOSIS — E10.65 TYPE 1 DIABETES MELLITUS WITH HYPERGLYCEMIA: ICD-10-CM

## 2023-09-25 DIAGNOSIS — E55.9 VITAMIN D DEFICIENCY: ICD-10-CM

## 2023-09-25 DIAGNOSIS — E78.5 DYSLIPIDEMIA: ICD-10-CM

## 2023-09-25 DIAGNOSIS — E10.49 TYPE 1 DIABETES MELLITUS WITH OTHER DIABETIC NEUROLOGICAL COMPLICATION: ICD-10-CM

## 2023-09-25 LAB
25(OH)D3 SERPL-MCNC: 41 NG/ML (ref 30–100)
ALBUMIN SERPL-MCNC: 4.2 G/DL (ref 3.5–5.2)
ALBUMIN UR-MCNC: 22 MG/DL
ALBUMIN/GLOB SERPL: 1.6 G/DL
ALP SERPL-CCNC: 62 U/L (ref 39–117)
ALT SERPL W P-5'-P-CCNC: 21 U/L (ref 1–41)
ANION GAP SERPL CALCULATED.3IONS-SCNC: 11 MMOL/L (ref 5–15)
AST SERPL-CCNC: 22 U/L (ref 1–40)
BILIRUB SERPL-MCNC: 0.5 MG/DL (ref 0–1.2)
BUN SERPL-MCNC: 9 MG/DL (ref 6–20)
BUN/CREAT SERPL: 8.1 (ref 7–25)
CALCIUM SPEC-SCNC: 9.3 MG/DL (ref 8.6–10.5)
CHLORIDE SERPL-SCNC: 103 MMOL/L (ref 98–107)
CHOLEST SERPL-MCNC: 170 MG/DL (ref 0–200)
CO2 SERPL-SCNC: 25 MMOL/L (ref 22–29)
CREAT SERPL-MCNC: 1.11 MG/DL (ref 0.76–1.27)
CREAT UR-MCNC: 125.1 MG/DL
EGFRCR SERPLBLD CKD-EPI 2021: 77.9 ML/MIN/1.73
GLOBULIN UR ELPH-MCNC: 2.6 GM/DL
GLUCOSE SERPL-MCNC: 285 MG/DL (ref 65–99)
HBA1C MFR BLD: 7.9 % (ref 4.8–5.6)
HDLC SERPL-MCNC: 85 MG/DL (ref 40–60)
LDLC SERPL CALC-MCNC: 78 MG/DL (ref 0–100)
LDLC/HDLC SERPL: 0.93 {RATIO}
MICROALBUMIN/CREAT UR: 175.9 MG/G
POTASSIUM SERPL-SCNC: 4.9 MMOL/L (ref 3.5–5.2)
PROT SERPL-MCNC: 6.8 G/DL (ref 6–8.5)
SODIUM SERPL-SCNC: 139 MMOL/L (ref 136–145)
TRIGL SERPL-MCNC: 29 MG/DL (ref 0–150)
TSH SERPL DL<=0.05 MIU/L-ACNC: 0.53 UIU/ML (ref 0.27–4.2)
VLDLC SERPL-MCNC: 7 MG/DL (ref 5–40)

## 2023-09-25 PROCEDURE — 82570 ASSAY OF URINE CREATININE: CPT

## 2023-09-25 PROCEDURE — 82306 VITAMIN D 25 HYDROXY: CPT

## 2023-09-25 PROCEDURE — 80061 LIPID PANEL: CPT

## 2023-09-25 PROCEDURE — 84443 ASSAY THYROID STIM HORMONE: CPT

## 2023-09-25 PROCEDURE — 80053 COMPREHEN METABOLIC PANEL: CPT

## 2023-09-25 PROCEDURE — 83036 HEMOGLOBIN GLYCOSYLATED A1C: CPT

## 2023-09-25 PROCEDURE — 36415 COLL VENOUS BLD VENIPUNCTURE: CPT

## 2023-09-25 PROCEDURE — 82043 UR ALBUMIN QUANTITATIVE: CPT

## 2023-10-02 ENCOUNTER — OFFICE VISIT (OUTPATIENT)
Dept: ENDOCRINOLOGY | Facility: CLINIC | Age: 58
End: 2023-10-02
Payer: COMMERCIAL

## 2023-10-02 VITALS
WEIGHT: 190.8 LBS | DIASTOLIC BLOOD PRESSURE: 90 MMHG | OXYGEN SATURATION: 98 % | HEIGHT: 69 IN | HEART RATE: 101 BPM | SYSTOLIC BLOOD PRESSURE: 145 MMHG | BODY MASS INDEX: 28.26 KG/M2

## 2023-10-02 DIAGNOSIS — E55.9 VITAMIN D DEFICIENCY: ICD-10-CM

## 2023-10-02 DIAGNOSIS — E78.5 DYSLIPIDEMIA: ICD-10-CM

## 2023-10-02 DIAGNOSIS — E10.65 TYPE 1 DIABETES MELLITUS WITH HYPERGLYCEMIA: Primary | ICD-10-CM

## 2023-10-02 LAB — GLUCOSE BLDC GLUCOMTR-MCNC: 167 MG/DL (ref 70–105)

## 2023-10-02 PROCEDURE — 99214 OFFICE O/P EST MOD 30 MIN: CPT | Performed by: INTERNAL MEDICINE

## 2023-10-02 PROCEDURE — 82948 REAGENT STRIP/BLOOD GLUCOSE: CPT | Performed by: INTERNAL MEDICINE

## 2023-10-02 RX ORDER — INSULIN DEGLUDEC INJECTION 100 U/ML
30 INJECTION, SOLUTION SUBCUTANEOUS NIGHTLY
Qty: 45 ML | Refills: 3 | Status: SHIPPED | OUTPATIENT
Start: 2023-10-02

## 2023-10-02 RX ORDER — ACYCLOVIR 400 MG/1
1 TABLET ORAL
Qty: 9 EACH | Refills: 3 | Status: SHIPPED | OUTPATIENT
Start: 2023-10-02

## 2023-10-02 RX ORDER — ATORVASTATIN CALCIUM 10 MG/1
10 TABLET, FILM COATED ORAL DAILY
Qty: 90 TABLET | Refills: 3 | Status: SHIPPED | OUTPATIENT
Start: 2023-10-02

## 2023-10-02 RX ORDER — BLOOD SUGAR DIAGNOSTIC
STRIP MISCELLANEOUS
Qty: 100 EACH | Refills: 3 | Status: SHIPPED | OUTPATIENT
Start: 2023-10-02

## 2023-10-02 RX ORDER — GLUCAGON 3 MG/1
3 POWDER NASAL AS NEEDED
Qty: 1 EACH | Refills: 1 | Status: SHIPPED | OUTPATIENT
Start: 2023-10-02

## 2023-10-02 RX ORDER — INSULIN ASPART 100 [IU]/ML
INJECTION, SOLUTION INTRAVENOUS; SUBCUTANEOUS
Qty: 45 ML | Refills: 3 | Status: SHIPPED | OUTPATIENT
Start: 2023-10-02

## 2023-10-02 RX ORDER — CARVEDILOL 3.12 MG/1
TABLET ORAL
Qty: 180 TABLET | Refills: 3 | Status: SHIPPED | OUTPATIENT
Start: 2023-10-02

## 2023-10-02 NOTE — PROGRESS NOTES
[facility]        Patient Care Team:  Provider, No Known as PCP - General  Provider, No Known as PCP - Family Medicine  Dorcas Garza MD as Consulting Physician (Cardiology)    Chief Complaint:    Chief Complaint   Patient presents with    Diabetes     FU / DM type 1/ / 8a 219 only coffee today         Subjective   Here for diabetes f/u.Last seen in Nov 2021  Blood sugars: did not bring records  Ran out of DexCom sensors  Exercise program: walking  Not taking vit D  Working night shift @ [employer]  Off Coreg  for a few months    Interval History:     Patient Complaints:  going through a divorce  Patient Denies:  hypoglycemia  History taken from: patient    Review of Systems:   Review of Systems   Eyes:  Negative for blurred vision.   Gastrointestinal:  Negative for nausea.   Endocrine: Negative for polyuria.   Neurological:  Negative for headache.   Lost  19 lb since last visit    Objective     Vital Signs  Heart Rate:  [101] 101  BP: (145)/(90) 145/90    Physical Exam:     General Appearance:    Alert, cooperative, in no acute distress   Head:    Normocephalic, without obvious abnormality, atraumatic   Eyes:            Lids and lashes normal, conjunctivae and sclerae normal, no   icterus, no pallor, corneas clear, PERRLA   Throat:   No oral lesions,  oral mucosa moist   Neck:   No adenopathy, supple,  no thyromegaly, no carotid bruit   Lungs:     Clear    Heart:    Regular rhythm and normal rate   Chest Wall:    No abnormalities observed   Abdomen:     Normal bowel sounds, soft                 Extremities:   Moves all extremities well, no edema               Pulses:   Pulses palpable and equal bilaterally   Skin:   Dry   Neurologic:  DTR absent in ankles, vibratory sense 25% decreased in toes, able to feel the 10g monofilament ( same as 2y ago )          Results Review:    I have reviewed the patient's new clinical results, labs & imaging.    Medication Review:   Prior to Admission  medications    Medication Sig Start Date End Date Taking? Authorizing Provider   Accu-Chek FastClix Lancets misc TEST BLOOD GLUCOSE FOUR TIMES DAILY AS DIRECTED 7/16/20  Yes Beto Lee MD   Alcohol Swabs (B-D SINGLE USE SWABS REGULAR) pads TEST BLOOD GLUCOSE FOUR TIMES DAILY AS DIRECTED 7/16/20  Yes Beto Lee MD   aspirin 81 MG EC tablet ASPIRIN 81 TBEC 11/8/17  Yes ProviderRylee MD   Blood Glucose Monitoring Suppl (ACCU-CHEK GUIDE) w/Device kit TEST FOUR TIMES DAILY AS DIRECTED 7/16/20  Yes Beto Lee MD   carvedilol (COREG) 3.125 MG tablet TAKE 1 TABLET BY MOUTH TWICE A DAY 10/2/23  Yes Beto Lee MD   Continuous Blood Gluc  (DEXCOM G6 ) device 1 each Continuous. Pt to use to monitor his blood sugars 11/18/19  Yes Beto Lee MD   glucose blood (Accu-Chek Guide) test strip To check blood sugar daily. 10/2/23  Yes Beto Lee MD   insulin degludec (Tresiba FlexTouch) 100 UNIT/ML solution pen-injector injection Inject 30 Units under the skin into the appropriate area as directed Every Night. 10/2/23  Yes Beto Lee MD   Insulin Pen Needle (NOVOFINE) 32G X 6 MM misc Use with insulin pen 4x/d 10/30/19  Yes Beto Lee MD   Loratadine (CLARITIN PO) Take  by mouth Daily.   Yes Rylee Marie MD   NovoLOG FlexPen 100 UNIT/ML solution pen-injector sc pen Inject 15 units ac tid plus sliding scale. MDD 60 units daily 10/2/23  Yes Beto Lee MD   vitamin D (ERGOCALCIFEROL) 1.25 MG (17442 UT) capsule capsule TAKE 1 CAPSULE BY MOUTH ONE TIME PER WEEK 4/20/23  Yes Beto Lee MD   ACCU-CHEK GUIDE test strip TEST BLOOD GLUCOSE FOUR TIMES DAILY AS DIRECTED 7/16/20 10/2/23 Yes Beto Lee MD   clopidogrel (PLAVIX) 75 MG tablet TAKE 1 TABLET BY MOUTH EVERY DAY 4/19/21 10/2/23 Yes Doracs Garza MD   Continuous Blood Gluc Sensor (DEXCOM G6 SENSOR) Pt to replace every 10 days 5/4/20 10/2/23 Yes Beto Lee MD    doxycycline (VIBRAMYCIN) 100 MG capsule Take 1 capsule by mouth 2 (Two) Times a Day. 4/26/22 10/2/23 Yes Rylee Marie MD   glucagon (GLUCAGEN) 1 MG injection GLUCAGON EMERGENCY 1 MG KIT 11/4/15 10/2/23 Yes Rylee Marie MD   NovoLOG FlexPen 100 UNIT/ML solution pen-injector sc pen INJECT 3 TIMES A DAY PER SLIDING SCALE *MAX DOSE 60 UNITS 7/24/23 10/2/23 Yes Beto Lee MD   Tresiba FlexTouch 100 UNIT/ML solution pen-injector injection INJECT 30 UNITS UNDER THE SKIN EVERY NIGHT. 12/28/22 10/2/23 Yes Beto Lee MD   atorvastatin (LIPITOR) 10 MG tablet Take 1 tablet by mouth Daily. 10/2/23   Beto Lee MD   Continuous Blood Gluc Sensor (Dexcom G7 Sensor) misc Use 1 Device Every 10 (Ten) Days. 10/2/23   Beto Lee MD   Glucagon (Baqsimi One Pack) 3 MG/DOSE powder 3 mg into the nostril(s) as directed by provider As Needed (in case of severe hypoglycemia). 10/2/23   Beto Lee MD   promethazine-dextromethorphan (PROMETHAZINE-DM) 6.25-15 MG/5ML syrup TAKE 5 MILLILITERS BY MOUTH EVERY 4 TO 6 HOURS AS NEEDED **MAX 30 MLS IN 24 HOURS**  Patient not taking: Reported on 10/2/2023 4/26/22   Rylee Marie MD   carvedilol (COREG) 3.125 MG tablet TAKE 1 TABLET BY MOUTH TWICE A DAY  Patient not taking: Reported on 10/2/2023 1/30/23 10/2/23  Beto Lee MD       Lab Results (most recent)       Procedure Component Value Units Date/Time    POC Glucose [[id number]]  (Abnormal) Collected: 10/02/23 1043    Specimen: Blood Updated: 10/02/23 1046     Glucose 167 mg/dL      Comment: Serial Number: [id number]:  [id number]             Lab Results   Component Value Date    HGBA1C 7.90 (H) 09/25/2023    HGBA1C 8.0 (H) 04/25/2022    HGBA1C 7.8 (H) 10/25/2021      Lab Results   Component Value Date    GLUCOSE 285 (H) 09/25/2023    BUN 9 09/25/2023    CREATININE 1.11 09/25/2023    EGFRIFNONA 76 04/30/2021    BCR 8.1 09/25/2023    K 4.9 09/25/2023    CO2 25.0 09/25/2023     CALCIUM 9.3 09/25/2023    ALBUMIN 4.2 09/25/2023    LABIL2 1.2 05/16/2019    AST 22 09/25/2023    ALT 21 09/25/2023    CHOL 170 09/25/2023    LDL 78 09/25/2023    HDL 85 (H) 09/25/2023    TRIG 29 09/25/2023     Lab Results   Component Value Date    TSH 0.530 09/25/2023    FREET4 0.92 11/10/2017    JREB88MZ 41.0 09/25/2023     Microalb/cr ratio 175.9    Assessment & Plan     Diagnoses and all orders for this visit:    1. Type 1 diabetes mellitus with hyperglycemia (Primary)  -     Continuous Blood Gluc Sensor (Dexcom G7 Sensor) misc; Use 1 Device Every 10 (Ten) Days.  Dispense: 9 each; Refill: 3  -     Glucagon (Baqsimi One Pack) 3 MG/DOSE powder; 3 mg into the nostril(s) as directed by provider As Needed (in case of severe hypoglycemia).  Dispense: 1 each; Refill: 1  -     glucose blood (Accu-Chek Guide) test strip; To check blood sugar daily.  Dispense: 100 each; Refill: 3  -     NovoLOG FlexPen 100 UNIT/ML solution pen-injector sc pen; Inject 15 units ac tid plus sliding scale. MDD 60 units daily  Dispense: 45 mL; Refill: 3  -     insulin degludec (Tresiba FlexTouch) 100 UNIT/ML solution pen-injector injection; Inject 30 Units under the skin into the appropriate area as directed Every Night.  Dispense: 45 mL; Refill: 3  -     Hemoglobin A1c; Future    2. Vitamin D deficiency    3. Dyslipidemia  -     atorvastatin (LIPITOR) 10 MG tablet; Take 1 tablet by mouth Daily.  Dispense: 90 tablet; Refill: 3  -     Comprehensive Metabolic Panel; Future    Other orders  -     POC Glucose  -     carvedilol (COREG) 3.125 MG tablet; TAKE 1 TABLET BY MOUTH TWICE A DAY  Dispense: 180 tablet; Refill: 3    Glucose control about the same, but not @ goal. Vit D & lipids stable.    See eye doctor.  Continue exercise.  Continue diabetes meds.  Restart Coreg 2x/d.  Start atorvastatin 10 mg once a week.  Call if blood sugars are running under 100 or over 200.  Ordered DexCom G7.        Beto Lee MD  10/02/23  19:33 EDT

## 2023-10-02 NOTE — PATIENT INSTRUCTIONS
See eye doctor.  Continue exercise.  Continue diabetes meds.  Restart Coreg 2x/d.  Start atorvastatin 10 mg once a week.  Call if blood sugars are running under 100 or over 200.  F/u in 6 months, with labs prior.

## 2024-04-23 ENCOUNTER — LAB (OUTPATIENT)
Dept: LAB | Facility: HOSPITAL | Age: 59
End: 2024-04-23

## 2024-04-23 DIAGNOSIS — E10.65 TYPE 1 DIABETES MELLITUS WITH HYPERGLYCEMIA: ICD-10-CM

## 2024-04-23 DIAGNOSIS — E78.5 DYSLIPIDEMIA: ICD-10-CM

## 2024-04-23 LAB
ALBUMIN SERPL-MCNC: 4.2 G/DL (ref 3.5–5.2)
ALBUMIN/GLOB SERPL: 1.5 G/DL
ALP SERPL-CCNC: 81 U/L (ref 39–117)
ALT SERPL W P-5'-P-CCNC: 23 U/L (ref 1–41)
ANION GAP SERPL CALCULATED.3IONS-SCNC: 9 MMOL/L (ref 5–15)
AST SERPL-CCNC: 19 U/L (ref 1–40)
BILIRUB SERPL-MCNC: 0.3 MG/DL (ref 0–1.2)
BUN SERPL-MCNC: 8 MG/DL (ref 6–20)
BUN/CREAT SERPL: 8.8 (ref 7–25)
CALCIUM SPEC-SCNC: 8.6 MG/DL (ref 8.6–10.5)
CHLORIDE SERPL-SCNC: 105 MMOL/L (ref 98–107)
CO2 SERPL-SCNC: 24 MMOL/L (ref 22–29)
CREAT SERPL-MCNC: 0.91 MG/DL (ref 0.76–1.27)
EGFRCR SERPLBLD CKD-EPI 2021: 98.3 ML/MIN/1.73
GLOBULIN UR ELPH-MCNC: 2.8 GM/DL
GLUCOSE SERPL-MCNC: 204 MG/DL (ref 65–99)
HBA1C MFR BLD: 8.7 % (ref 4.8–5.6)
POTASSIUM SERPL-SCNC: 3.9 MMOL/L (ref 3.5–5.2)
PROT SERPL-MCNC: 7 G/DL (ref 6–8.5)
SODIUM SERPL-SCNC: 138 MMOL/L (ref 136–145)

## 2024-04-23 PROCEDURE — 83036 HEMOGLOBIN GLYCOSYLATED A1C: CPT

## 2024-04-23 PROCEDURE — 80053 COMPREHEN METABOLIC PANEL: CPT

## 2024-04-23 PROCEDURE — 36415 COLL VENOUS BLD VENIPUNCTURE: CPT

## 2024-11-05 DIAGNOSIS — E10.65 TYPE 1 DIABETES MELLITUS WITH HYPERGLYCEMIA: ICD-10-CM

## 2024-11-07 RX ORDER — INSULIN ASPART 100 [IU]/ML
INJECTION, SOLUTION INTRAVENOUS; SUBCUTANEOUS
Qty: 1 ML | Refills: 0 | OUTPATIENT
Start: 2024-11-07

## 2024-11-26 NOTE — TELEPHONE ENCOUNTER
Call to patient. Left VM informing patient it is OK to eat blue jello. Just no red or purple.  Advised patient to return call to clinic with any additional questions.   Thank you.  Tresiba U100. Same dose as Lantus unit per unit.

## 2024-12-09 ENCOUNTER — APPOINTMENT (OUTPATIENT)
Dept: GENERAL RADIOLOGY | Facility: HOSPITAL | Age: 59
DRG: 638 | End: 2024-12-09

## 2024-12-09 ENCOUNTER — APPOINTMENT (OUTPATIENT)
Dept: CT IMAGING | Facility: HOSPITAL | Age: 59
DRG: 638 | End: 2024-12-09

## 2024-12-09 ENCOUNTER — HOSPITAL ENCOUNTER (INPATIENT)
Facility: HOSPITAL | Age: 59
LOS: 7 days | Discharge: HOME | DRG: 638 | End: 2024-12-16
Attending: EMERGENCY MEDICINE | Admitting: EMERGENCY MEDICINE

## 2024-12-09 DIAGNOSIS — T68.XXXA HYPOTHERMIA, INITIAL ENCOUNTER: ICD-10-CM

## 2024-12-09 DIAGNOSIS — B34.8 RHINOVIRUS INFECTION: ICD-10-CM

## 2024-12-09 DIAGNOSIS — E10.65 TYPE 1 DIABETES MELLITUS WITH HYPERGLYCEMIA: ICD-10-CM

## 2024-12-09 DIAGNOSIS — E10.11 DIABETIC KETOACIDOSIS WITH COMA ASSOCIATED WITH TYPE 1 DIABETES MELLITUS: Primary | ICD-10-CM

## 2024-12-09 DIAGNOSIS — D72.829 LEUKOCYTOSIS, UNSPECIFIED TYPE: ICD-10-CM

## 2024-12-09 PROBLEM — E11.10 DKA (DIABETIC KETOACIDOSIS): Status: ACTIVE | Noted: 2024-12-09

## 2024-12-09 LAB
ALBUMIN SERPL-MCNC: 4.2 G/DL (ref 3.5–5.2)
ALBUMIN/GLOB SERPL: 1.3 G/DL
ALP SERPL-CCNC: 153 U/L (ref 39–117)
ALT SERPL W P-5'-P-CCNC: 19 U/L (ref 1–41)
ANION GAP SERPL CALCULATED.3IONS-SCNC: 35.8 MMOL/L (ref 5–15)
ARTERIAL PATENCY WRIST A: POSITIVE
AST SERPL-CCNC: 21 U/L (ref 1–40)
ATMOSPHERIC PRESS: ABNORMAL MM[HG]
B PARAPERT DNA SPEC QL NAA+PROBE: NOT DETECTED
B PERT DNA SPEC QL NAA+PROBE: NOT DETECTED
BACTERIA UR QL AUTO: NORMAL /HPF
BASE EXCESS BLDA CALC-SCNC: <-30 MMOL/L (ref 0–3)
BASOPHILS # BLD MANUAL: 0.34 10*3/MM3 (ref 0–0.2)
BASOPHILS NFR BLD MANUAL: 1 % (ref 0–1.5)
BDY SITE: ABNORMAL
BILIRUB SERPL-MCNC: 0.2 MG/DL (ref 0–1.2)
BILIRUB UR QL STRIP: NEGATIVE
BUN SERPL-MCNC: 52 MG/DL (ref 6–20)
BUN/CREAT SERPL: 16 (ref 7–25)
C PNEUM DNA NPH QL NAA+NON-PROBE: NOT DETECTED
CA-I BLDA-SCNC: 1.3 MMOL/L (ref 1.15–1.33)
CALCIUM SPEC-SCNC: 10 MG/DL (ref 8.6–10.5)
CHLORIDE SERPL-SCNC: 88 MMOL/L (ref 98–107)
CK SERPL-CCNC: 247 U/L (ref 20–200)
CLARITY UR: CLEAR
CO2 SERPL-SCNC: 4.2 MMOL/L (ref 22–29)
COLOR UR: YELLOW
CREAT BLDA-MCNC: 2.67 MG/DL (ref 0.6–1.3)
CREAT SERPL-MCNC: 3.25 MG/DL (ref 0.76–1.27)
D-LACTATE SERPL-SCNC: 2.4 MMOL/L (ref 0.5–2)
D-LACTATE SERPL-SCNC: 3 MMOL/L (ref 0.2–2)
DEPRECATED RDW RBC AUTO: 51.3 FL (ref 37–54)
EGFRCR SERPLBLD CKD-EPI 2021: 21.3 ML/MIN/1.73
EGFRCR SERPLBLD CKD-EPI 2021: 27 ML/MIN/1.73
EOSINOPHIL # BLD MANUAL: 0.34 10*3/MM3 (ref 0–0.4)
EOSINOPHIL NFR BLD MANUAL: 1 % (ref 0.3–6.2)
ERYTHROCYTE [DISTWIDTH] IN BLOOD BY AUTOMATED COUNT: 12.3 % (ref 12.3–15.4)
FLUAV SUBTYP SPEC NAA+PROBE: NOT DETECTED
FLUBV RNA ISLT QL NAA+PROBE: NOT DETECTED
GEN 5 1HR TROPONIN T REFLEX: 36 NG/L
GLOBULIN UR ELPH-MCNC: 3.2 GM/DL
GLUCOSE BLDC GLUCOMTR-MCNC: >600 MG/DL (ref 70–105)
GLUCOSE BLDC GLUCOMTR-MCNC: >700 MG/DL (ref 74–100)
GLUCOSE BLDC GLUCOMTR-MCNC: >700 MG/DL (ref 74–100)
GLUCOSE SERPL-MCNC: 1172 MG/DL (ref 65–99)
GLUCOSE SERPL-MCNC: 705 MG/DL (ref 65–99)
GLUCOSE SERPL-MCNC: 863 MG/DL (ref 65–99)
GLUCOSE UR STRIP-MCNC: ABNORMAL MG/DL
HADV DNA SPEC NAA+PROBE: NOT DETECTED
HBA1C MFR BLD: 13.1 % (ref 4.8–5.6)
HCO3 BLDA-SCNC: 2.8 MMOL/L (ref 21–28)
HCOV 229E RNA SPEC QL NAA+PROBE: NOT DETECTED
HCOV HKU1 RNA SPEC QL NAA+PROBE: NOT DETECTED
HCOV NL63 RNA SPEC QL NAA+PROBE: NOT DETECTED
HCOV OC43 RNA SPEC QL NAA+PROBE: NOT DETECTED
HCT VFR BLD AUTO: 49.5 % (ref 37.5–51)
HCT VFR BLDA CALC: 50 % (ref 38–51)
HEMODILUTION: NO
HGB BLD-MCNC: 14.9 G/DL (ref 13–17.7)
HGB BLDA-MCNC: 16.8 G/DL (ref 12–17)
HGB UR QL STRIP.AUTO: ABNORMAL
HMPV RNA NPH QL NAA+NON-PROBE: NOT DETECTED
HPIV1 RNA ISLT QL NAA+PROBE: NOT DETECTED
HPIV2 RNA SPEC QL NAA+PROBE: NOT DETECTED
HPIV3 RNA NPH QL NAA+PROBE: NOT DETECTED
HPIV4 P GENE NPH QL NAA+PROBE: NOT DETECTED
HYALINE CASTS UR QL AUTO: NORMAL /LPF
INHALED O2 CONCENTRATION: 21 %
KETONES UR QL STRIP: ABNORMAL
LEUKOCYTE ESTERASE UR QL STRIP.AUTO: NEGATIVE
LYMPHOCYTES # BLD MANUAL: 2.35 10*3/MM3 (ref 0.7–3.1)
LYMPHOCYTES NFR BLD MANUAL: 9 % (ref 5–12)
Lab: ABNORMAL
M PNEUMO IGG SER IA-ACNC: NOT DETECTED
MACROCYTES BLD QL SMEAR: ABNORMAL
MAGNESIUM SERPL-MCNC: 3.6 MG/DL (ref 1.6–2.6)
MAGNESIUM SERPL-MCNC: NORMAL MG/DL
MCH RBC QN AUTO: 33.4 PG (ref 26.6–33)
MCHC RBC AUTO-ENTMCNC: 30.1 G/DL (ref 31.5–35.7)
MCV RBC AUTO: 111 FL (ref 79–97)
MODALITY: ABNORMAL
MONOCYTES # BLD: 3.02 10*3/MM3 (ref 0.1–0.9)
MRSA DNA SPEC QL NAA+PROBE: NORMAL
NEUTROPHILS # BLD AUTO: 27.49 10*3/MM3 (ref 1.7–7)
NEUTROPHILS NFR BLD MANUAL: 80 % (ref 42.7–76)
NEUTS BAND NFR BLD MANUAL: 2 % (ref 0–5)
NITRITE UR QL STRIP: NEGATIVE
NOTIFIED WHO: ABNORMAL
OSMOLALITY SERPL: 386 MOSM/KG (ref 275–295)
PCO2 BLDA: 17.4 MM HG (ref 35–48)
PH BLDA: 6.81 PH UNITS (ref 7.35–7.45)
PH UR STRIP.AUTO: <=5 [PH] (ref 5–8)
PHOSPHATE SERPL-MCNC: 12.2 MG/DL (ref 2.5–4.5)
PHOSPHATE SERPL-MCNC: NORMAL MG/DL
PLAT MORPH BLD: NORMAL
PLATELET # BLD AUTO: 399 10*3/MM3 (ref 140–450)
PMV BLD AUTO: 11.5 FL (ref 6–12)
PO2 BLD: 788 MM[HG] (ref 0–500)
PO2 BLDA: 165.4 MM HG (ref 83–108)
POTASSIUM BLDA-SCNC: 6 MMOL/L (ref 3.5–4.5)
POTASSIUM SERPL-SCNC: 5.6 MMOL/L (ref 3.5–5.2)
PROT SERPL-MCNC: 7.4 G/DL (ref 6–8.5)
PROT UR QL STRIP: ABNORMAL
RBC # BLD AUTO: 4.46 10*6/MM3 (ref 4.14–5.8)
RBC # UR STRIP: NORMAL /HPF
READ BACK: NO
REF LAB TEST METHOD: NORMAL
RHINOVIRUS RNA SPEC NAA+PROBE: DETECTED
RSV RNA NPH QL NAA+NON-PROBE: NOT DETECTED
SAO2 % BLDCOA: 97.1 % (ref 94–98)
SARS-COV-2 RNA RESP QL NAA+PROBE: NOT DETECTED
SCAN SLIDE: NORMAL
SODIUM BLD-SCNC: 121 MMOL/L (ref 138–146)
SODIUM SERPL-SCNC: 128 MMOL/L (ref 136–145)
SP GR UR STRIP: 1.02 (ref 1–1.03)
SQUAMOUS #/AREA URNS HPF: NORMAL /HPF
TOXIC GRANULATION: ABNORMAL
TROPONIN T NUMERIC DELTA: ABNORMAL
TROPONIN T SERPL HS-MCNC: 40 NG/L
TROPONIN T SERPL HS-MCNC: NORMAL NG/L
UROBILINOGEN UR QL STRIP: ABNORMAL
VARIANT LYMPHS NFR BLD MANUAL: 7 % (ref 19.6–45.3)
WBC # UR STRIP: NORMAL /HPF
WBC NRBC COR # BLD AUTO: 33.52 10*3/MM3 (ref 3.4–10.8)
WHOLE BLOOD HOLD COAG: NORMAL

## 2024-12-09 PROCEDURE — 84484 ASSAY OF TROPONIN QUANT: CPT | Performed by: NURSE PRACTITIONER

## 2024-12-09 PROCEDURE — 87040 BLOOD CULTURE FOR BACTERIA: CPT | Performed by: EMERGENCY MEDICINE

## 2024-12-09 PROCEDURE — 83605 ASSAY OF LACTIC ACID: CPT | Performed by: EMERGENCY MEDICINE

## 2024-12-09 PROCEDURE — 84100 ASSAY OF PHOSPHORUS: CPT | Performed by: EMERGENCY MEDICINE

## 2024-12-09 PROCEDURE — 36600 WITHDRAWAL OF ARTERIAL BLOOD: CPT | Performed by: EMERGENCY MEDICINE

## 2024-12-09 PROCEDURE — 87186 SC STD MICRODIL/AGAR DIL: CPT | Performed by: EMERGENCY MEDICINE

## 2024-12-09 PROCEDURE — 80051 ELECTROLYTE PANEL: CPT

## 2024-12-09 PROCEDURE — 51702 INSERT TEMP BLADDER CATH: CPT

## 2024-12-09 PROCEDURE — 25010000002 CEFEPIME PER 500 MG: Performed by: EMERGENCY MEDICINE

## 2024-12-09 PROCEDURE — 71045 X-RAY EXAM CHEST 1 VIEW: CPT

## 2024-12-09 PROCEDURE — 93005 ELECTROCARDIOGRAM TRACING: CPT | Performed by: EMERGENCY MEDICINE

## 2024-12-09 PROCEDURE — 83930 ASSAY OF BLOOD OSMOLALITY: CPT | Performed by: EMERGENCY MEDICINE

## 2024-12-09 PROCEDURE — 82330 ASSAY OF CALCIUM: CPT

## 2024-12-09 PROCEDURE — 36415 COLL VENOUS BLD VENIPUNCTURE: CPT

## 2024-12-09 PROCEDURE — 87154 CUL TYP ID BLD PTHGN 6+ TRGT: CPT | Performed by: EMERGENCY MEDICINE

## 2024-12-09 PROCEDURE — 83735 ASSAY OF MAGNESIUM: CPT | Performed by: EMERGENCY MEDICINE

## 2024-12-09 PROCEDURE — 25810000003 SODIUM CHLORIDE 0.9 % SOLUTION: Performed by: EMERGENCY MEDICINE

## 2024-12-09 PROCEDURE — 82948 REAGENT STRIP/BLOOD GLUCOSE: CPT

## 2024-12-09 PROCEDURE — 82565 ASSAY OF CREATININE: CPT

## 2024-12-09 PROCEDURE — 83036 HEMOGLOBIN GLYCOSYLATED A1C: CPT | Performed by: EMERGENCY MEDICINE

## 2024-12-09 PROCEDURE — 87077 CULTURE AEROBIC IDENTIFY: CPT | Performed by: EMERGENCY MEDICINE

## 2024-12-09 PROCEDURE — 0202U NFCT DS 22 TRGT SARS-COV-2: CPT | Performed by: EMERGENCY MEDICINE

## 2024-12-09 PROCEDURE — 80053 COMPREHEN METABOLIC PANEL: CPT | Performed by: EMERGENCY MEDICINE

## 2024-12-09 PROCEDURE — 82803 BLOOD GASES ANY COMBINATION: CPT | Performed by: EMERGENCY MEDICINE

## 2024-12-09 PROCEDURE — 85018 HEMOGLOBIN: CPT

## 2024-12-09 PROCEDURE — 82550 ASSAY OF CK (CPK): CPT | Performed by: NURSE PRACTITIONER

## 2024-12-09 PROCEDURE — 81001 URINALYSIS AUTO W/SCOPE: CPT | Performed by: EMERGENCY MEDICINE

## 2024-12-09 PROCEDURE — 82947 ASSAY GLUCOSE BLOOD QUANT: CPT | Performed by: NURSE PRACTITIONER

## 2024-12-09 PROCEDURE — 85025 COMPLETE CBC W/AUTO DIFF WBC: CPT | Performed by: EMERGENCY MEDICINE

## 2024-12-09 PROCEDURE — 85007 BL SMEAR W/DIFF WBC COUNT: CPT | Performed by: EMERGENCY MEDICINE

## 2024-12-09 PROCEDURE — 84484 ASSAY OF TROPONIN QUANT: CPT | Performed by: EMERGENCY MEDICINE

## 2024-12-09 PROCEDURE — 70450 CT HEAD/BRAIN W/O DYE: CPT

## 2024-12-09 PROCEDURE — 99291 CRITICAL CARE FIRST HOUR: CPT

## 2024-12-09 PROCEDURE — 87641 MR-STAPH DNA AMP PROBE: CPT | Performed by: NURSE PRACTITIONER

## 2024-12-09 RX ORDER — SODIUM CHLORIDE AND POTASSIUM CHLORIDE 150; 900 MG/100ML; MG/100ML
200 INJECTION, SOLUTION INTRAVENOUS CONTINUOUS PRN
Status: DISCONTINUED | OUTPATIENT
Start: 2024-12-09 | End: 2024-12-10

## 2024-12-09 RX ORDER — DEXTROSE MONOHYDRATE, SODIUM CHLORIDE, AND POTASSIUM CHLORIDE 50; 2.98; 9 G/1000ML; G/1000ML; G/1000ML
125 INJECTION, SOLUTION INTRAVENOUS CONTINUOUS PRN
Status: DISCONTINUED | OUTPATIENT
Start: 2024-12-09 | End: 2024-12-10

## 2024-12-09 RX ORDER — POLYETHYLENE GLYCOL 3350 17 G/17G
17 POWDER, FOR SOLUTION ORAL DAILY PRN
Status: DISCONTINUED | OUTPATIENT
Start: 2024-12-09 | End: 2024-12-11

## 2024-12-09 RX ORDER — SODIUM CHLORIDE 0.9 % (FLUSH) 0.9 %
10 SYRINGE (ML) INJECTION AS NEEDED
Status: DISCONTINUED | OUTPATIENT
Start: 2024-12-09 | End: 2024-12-10

## 2024-12-09 RX ORDER — DEXMEDETOMIDINE HYDROCHLORIDE 4 UG/ML
.2-1.5 INJECTION, SOLUTION INTRAVENOUS
Status: DISCONTINUED | OUTPATIENT
Start: 2024-12-09 | End: 2024-12-11

## 2024-12-09 RX ORDER — SODIUM CHLORIDE 0.9 % (FLUSH) 0.9 %
10 SYRINGE (ML) INJECTION EVERY 12 HOURS SCHEDULED
Status: DISCONTINUED | OUTPATIENT
Start: 2024-12-09 | End: 2024-12-10

## 2024-12-09 RX ORDER — SODIUM CHLORIDE 450 MG/100ML
200 INJECTION, SOLUTION INTRAVENOUS CONTINUOUS PRN
Status: DISCONTINUED | OUTPATIENT
Start: 2024-12-09 | End: 2024-12-10

## 2024-12-09 RX ORDER — SODIUM CHLORIDE 9 MG/ML
40 INJECTION, SOLUTION INTRAVENOUS AS NEEDED
Status: DISCONTINUED | OUTPATIENT
Start: 2024-12-09 | End: 2024-12-09 | Stop reason: SDUPTHER

## 2024-12-09 RX ORDER — NICOTINE POLACRILEX 4 MG
15 LOZENGE BUCCAL
Status: DISCONTINUED | OUTPATIENT
Start: 2024-12-09 | End: 2024-12-10

## 2024-12-09 RX ORDER — SODIUM CHLORIDE 0.9 % (FLUSH) 0.9 %
10 SYRINGE (ML) INJECTION EVERY 12 HOURS SCHEDULED
Status: DISCONTINUED | OUTPATIENT
Start: 2024-12-09 | End: 2024-12-16 | Stop reason: HOSPADM

## 2024-12-09 RX ORDER — DEXTROSE MONOHYDRATE, SODIUM CHLORIDE, AND POTASSIUM CHLORIDE 50; 1.49; 4.5 G/1000ML; G/1000ML; G/1000ML
125 INJECTION, SOLUTION INTRAVENOUS CONTINUOUS PRN
Status: DISCONTINUED | OUTPATIENT
Start: 2024-12-09 | End: 2024-12-10

## 2024-12-09 RX ORDER — BISACODYL 10 MG
10 SUPPOSITORY, RECTAL RECTAL DAILY PRN
Status: DISCONTINUED | OUTPATIENT
Start: 2024-12-09 | End: 2024-12-11

## 2024-12-09 RX ORDER — DEXTROSE MONOHYDRATE, SODIUM CHLORIDE, AND POTASSIUM CHLORIDE 50; 2.98; 4.5 G/1000ML; G/1000ML; G/1000ML
125 INJECTION, SOLUTION INTRAVENOUS CONTINUOUS PRN
Status: DISCONTINUED | OUTPATIENT
Start: 2024-12-09 | End: 2024-12-10

## 2024-12-09 RX ORDER — SODIUM CHLORIDE AND POTASSIUM CHLORIDE 150; 450 MG/100ML; MG/100ML
200 INJECTION, SOLUTION INTRAVENOUS CONTINUOUS PRN
Status: DISCONTINUED | OUTPATIENT
Start: 2024-12-09 | End: 2024-12-10

## 2024-12-09 RX ORDER — DEXTROSE MONOHYDRATE, SODIUM CHLORIDE, AND POTASSIUM CHLORIDE 50; 1.49; 9 G/1000ML; G/1000ML; G/1000ML
125 INJECTION, SOLUTION INTRAVENOUS CONTINUOUS PRN
Status: DISCONTINUED | OUTPATIENT
Start: 2024-12-09 | End: 2024-12-10

## 2024-12-09 RX ORDER — ONDANSETRON 2 MG/ML
4 INJECTION INTRAMUSCULAR; INTRAVENOUS EVERY 6 HOURS PRN
Status: DISCONTINUED | OUTPATIENT
Start: 2024-12-09 | End: 2024-12-10

## 2024-12-09 RX ORDER — DEXTROSE MONOHYDRATE AND SODIUM CHLORIDE 5; .9 G/100ML; G/100ML
125 INJECTION, SOLUTION INTRAVENOUS CONTINUOUS PRN
Status: DISCONTINUED | OUTPATIENT
Start: 2024-12-09 | End: 2024-12-10

## 2024-12-09 RX ORDER — BISACODYL 5 MG/1
5 TABLET, DELAYED RELEASE ORAL DAILY PRN
Status: DISCONTINUED | OUTPATIENT
Start: 2024-12-09 | End: 2024-12-11

## 2024-12-09 RX ORDER — SODIUM CHLORIDE 0.9 % (FLUSH) 0.9 %
10 SYRINGE (ML) INJECTION AS NEEDED
Status: DISCONTINUED | OUTPATIENT
Start: 2024-12-09 | End: 2024-12-16 | Stop reason: HOSPADM

## 2024-12-09 RX ORDER — DEXTROSE MONOHYDRATE 25 G/50ML
10-50 INJECTION, SOLUTION INTRAVENOUS
Status: DISCONTINUED | OUTPATIENT
Start: 2024-12-09 | End: 2024-12-10

## 2024-12-09 RX ORDER — SODIUM CHLORIDE 9 MG/ML
200 INJECTION, SOLUTION INTRAVENOUS CONTINUOUS PRN
Status: DISCONTINUED | OUTPATIENT
Start: 2024-12-09 | End: 2024-12-10

## 2024-12-09 RX ORDER — SODIUM CHLORIDE AND POTASSIUM CHLORIDE 300; 900 MG/100ML; MG/100ML
200 INJECTION, SOLUTION INTRAVENOUS CONTINUOUS PRN
Status: DISCONTINUED | OUTPATIENT
Start: 2024-12-09 | End: 2024-12-10

## 2024-12-09 RX ORDER — AMOXICILLIN 250 MG
2 CAPSULE ORAL 2 TIMES DAILY
Status: DISCONTINUED | OUTPATIENT
Start: 2024-12-09 | End: 2024-12-11

## 2024-12-09 RX ORDER — ONDANSETRON 4 MG/1
4 TABLET, ORALLY DISINTEGRATING ORAL EVERY 6 HOURS PRN
Status: DISCONTINUED | OUTPATIENT
Start: 2024-12-09 | End: 2024-12-16 | Stop reason: HOSPADM

## 2024-12-09 RX ORDER — SODIUM CHLORIDE 9 MG/ML
40 INJECTION, SOLUTION INTRAVENOUS AS NEEDED
Status: DISCONTINUED | OUTPATIENT
Start: 2024-12-09 | End: 2024-12-10

## 2024-12-09 RX ORDER — NITROGLYCERIN 0.4 MG/1
0.4 TABLET SUBLINGUAL
Status: DISCONTINUED | OUTPATIENT
Start: 2024-12-09 | End: 2024-12-16 | Stop reason: HOSPADM

## 2024-12-09 RX ORDER — IBUPROFEN 600 MG/1
1 TABLET ORAL
Status: DISCONTINUED | OUTPATIENT
Start: 2024-12-09 | End: 2024-12-10

## 2024-12-09 RX ORDER — DEXTROSE MONOHYDRATE AND SODIUM CHLORIDE 5; .45 G/100ML; G/100ML
125 INJECTION, SOLUTION INTRAVENOUS CONTINUOUS PRN
Status: DISCONTINUED | OUTPATIENT
Start: 2024-12-09 | End: 2024-12-10

## 2024-12-09 RX ADMIN — Medication 10 ML: at 21:22

## 2024-12-09 RX ADMIN — MUPIROCIN 1 APPLICATION: 20 OINTMENT TOPICAL at 22:11

## 2024-12-09 RX ADMIN — SODIUM CHLORIDE 200 ML/HR: 9 INJECTION, SOLUTION INTRAVENOUS at 20:54

## 2024-12-09 RX ADMIN — CEFEPIME 2000 MG: 2 INJECTION, POWDER, FOR SOLUTION INTRAVENOUS at 17:30

## 2024-12-09 RX ADMIN — DEXMEDETOMIDINE HYDROCHLORIDE 0.2 MCG/KG/HR: 4 INJECTION, SOLUTION INTRAVENOUS at 22:53

## 2024-12-09 RX ADMIN — INSULIN HUMAN 5.4 UNITS/HR: 1 INJECTION, SOLUTION INTRAVENOUS at 17:25

## 2024-12-09 RX ADMIN — SODIUM CHLORIDE 1000 ML/HR: 0.9 INJECTION, SOLUTION INTRAVENOUS at 17:21

## 2024-12-09 RX ADMIN — Medication 10 ML: at 21:02

## 2024-12-09 NOTE — ED PROVIDER NOTES
"Subjective   History of Present Illness  Chief complaint: Found down    59-year-old male presents after being found down on his porch.  EMS states patient has history of diabetes.  They used an infrared thermometer and temperature was noted to be in the mid 80s.  No additional history can be obtained at this time.    History provided by:  EMS personnel      Review of Systems   Unable to perform ROS: Patient unresponsive       Past Medical History:   Diagnosis Date    Asthma     Coronary artery disease     Diabetes 1.5, managed as type 1     Hyperlipidemia        No Known Allergies    Past Surgical History:   Procedure Laterality Date    APPENDECTOMY      CARDIAC CATHETERIZATION      CORONARY ANGIOPLASTY WITH STENT PLACEMENT      SINUS SURGERY      TONSILLECTOMY         Family History   Problem Relation Age of Onset    Diabetes Mother     Heart disease Mother     Heart disease Father     Heart disease Maternal Grandmother     Diabetes Maternal Grandmother     Stroke Maternal Grandmother     Heart disease Maternal Grandfather     Diabetes Maternal Grandfather     Stroke Maternal Grandfather     Stroke Paternal Grandmother     Heart disease Paternal Grandmother     Diabetes Paternal Grandmother     Stroke Paternal Grandfather     Diabetes Paternal Grandfather     Heart disease Paternal Grandfather        Social History     Socioeconomic History    Marital status: Single   Tobacco Use    Smoking status: Never    Smokeless tobacco: Former     Types: Chew     Quit date: 2017   Vaping Use    Vaping status: Never Used   Substance and Sexual Activity    Alcohol use: Yes     Comment: occasionally    Drug use: No    Sexual activity: Defer       /59   Pulse 99   Temp (!) 90.9 °F (32.7 °C)   Resp 20   Ht 175.3 cm (69\")   Wt 86.5 kg (190 lb 11.2 oz)   SpO2 100%   BMI 28.16 kg/m²       Objective   Physical Exam  Vitals and nursing note reviewed.   Constitutional:       Comments: Patient is lethargic, not answering " questions   HENT:      Head: Normocephalic and atraumatic.      Mouth/Throat:      Mouth: Mucous membranes are moist.   Eyes:      Pupils: Pupils are equal, round, and reactive to light.   Cardiovascular:      Rate and Rhythm: Normal rate and regular rhythm.      Heart sounds: Normal heart sounds.   Pulmonary:      Effort: Pulmonary effort is normal. No respiratory distress.      Breath sounds: Normal breath sounds.   Abdominal:      General: There is no distension.      Palpations: Abdomen is soft.      Tenderness: There is no abdominal tenderness.   Musculoskeletal:         General: No deformity or signs of injury.   Skin:     General: Skin is dry.      Comments: Cool to touch   Neurological:      Comments: Moves all extremities equally.  Not following commands.  Not answering questions.         Procedures           ED Course      Results for orders placed or performed during the hospital encounter of 12/09/24   POC Glucose Once    Collection Time: 12/09/24  5:08 PM    Specimen: Blood   Result Value Ref Range    Glucose >600 (C) 70 - 105 mg/dL   Phosphorus    Collection Time: 12/09/24  5:14 PM    Specimen: Blood   Result Value Ref Range    Phosphorus     Osmolality, Serum    Collection Time: 12/09/24  5:14 PM    Specimen: Blood   Result Value Ref Range    Osmolality 386 (H) 275 - 295 mOsm/kg   Hemoglobin A1c    Collection Time: 12/09/24  5:14 PM    Specimen: Blood   Result Value Ref Range    Hemoglobin A1C 13.10 (H) 4.80 - 5.60 %   High Sensitivity Troponin T    Collection Time: 12/09/24  5:14 PM    Specimen: Blood   Result Value Ref Range    HS Troponin T     CBC Auto Differential    Collection Time: 12/09/24  5:14 PM    Specimen: Blood   Result Value Ref Range    WBC 33.52 (C) 3.40 - 10.80 10*3/mm3    RBC 4.46 4.14 - 5.80 10*6/mm3    Hemoglobin 14.9 13.0 - 17.7 g/dL    Hematocrit 49.5 37.5 - 51.0 %    .0 (H) 79.0 - 97.0 fL    MCH 33.4 (H) 26.6 - 33.0 pg    MCHC 30.1 (L) 31.5 - 35.7 g/dL    RDW 12.3 12.3 -  15.4 %    RDW-SD 51.3 37.0 - 54.0 fl    MPV 11.5 6.0 - 12.0 fL    Platelets 399 140 - 450 10*3/mm3   Scan Slide    Collection Time: 12/09/24  5:14 PM    Specimen: Blood   Result Value Ref Range    Scan Slide     Manual Differential    Collection Time: 12/09/24  5:14 PM    Specimen: Blood   Result Value Ref Range    Neutrophil % 80.0 (H) 42.7 - 76.0 %    Lymphocyte % 7.0 (L) 19.6 - 45.3 %    Monocyte % 9.0 5.0 - 12.0 %    Eosinophil % 1.0 0.3 - 6.2 %    Basophil % 1.0 0.0 - 1.5 %    Bands %  2.0 0.0 - 5.0 %    Neutrophils Absolute 27.49 (H) 1.70 - 7.00 10*3/mm3    Lymphocytes Absolute 2.35 0.70 - 3.10 10*3/mm3    Monocytes Absolute 3.02 (H) 0.10 - 0.90 10*3/mm3    Eosinophils Absolute 0.34 0.00 - 0.40 10*3/mm3    Basophils Absolute 0.34 (H) 0.00 - 0.20 10*3/mm3    Macrocytes Slight/1+ None Seen    Toxic Granulation Slight/1+ None Seen    Platelet Morphology Normal Normal   Light Blue Top    Collection Time: 12/09/24  5:14 PM   Result Value Ref Range    Extra Tube Hold for add-ons.    Magnesium    Collection Time: 12/09/24  5:17 PM    Specimen: Blood   Result Value Ref Range    Magnesium     Respiratory Panel PCR w/COVID-19(SARS-CoV-2) ION/ARLETH/TIFFANIE/PAD/COR/NUNO In-House, NP Swab in UTM/VTM, 2 HR TAT - Swab, Nasopharynx    Collection Time: 12/09/24  5:18 PM    Specimen: Nasopharynx; Swab   Result Value Ref Range    ADENOVIRUS, PCR Not Detected Not Detected    Coronavirus 229E Not Detected Not Detected    Coronavirus HKU1 Not Detected Not Detected    Coronavirus NL63 Not Detected Not Detected    Coronavirus OC43 Not Detected Not Detected    COVID19 Not Detected Not Detected - Ref. Range    Human Metapneumovirus Not Detected Not Detected    Human Rhinovirus/Enterovirus Detected (A) Not Detected    Influenza A PCR Not Detected Not Detected    Influenza B PCR Not Detected Not Detected    Parainfluenza Virus 1 Not Detected Not Detected    Parainfluenza Virus 2 Not Detected Not Detected    Parainfluenza Virus 3 Not Detected  Not Detected    Parainfluenza Virus 4 Not Detected Not Detected    RSV, PCR Not Detected Not Detected    Bordetella pertussis pcr Not Detected Not Detected    Bordetella parapertussis PCR Not Detected Not Detected    Chlamydophila pneumoniae PCR Not Detected Not Detected    Mycoplasma pneumo by PCR Not Detected Not Detected   ECG 12 Lead Electrolyte Imbalance    Collection Time: 12/09/24  5:18 PM   Result Value Ref Range    QT Interval 405 ms    QTC Interval 465 ms   Urinalysis With Microscopic If Indicated (No Culture) - Indwelling Urethral Catheter    Collection Time: 12/09/24  5:19 PM    Specimen: Indwelling Urethral Catheter; Urine   Result Value Ref Range    Color, UA Yellow Yellow, Straw    Appearance, UA Clear Clear    pH, UA <=5.0 5.0 - 8.0    Specific Gravity, UA 1.024 1.005 - 1.030    Glucose, UA >=1000 mg/dL (3+) (A) Negative    Ketones, UA 15 mg/dL (1+) (A) Negative    Bilirubin, UA Negative Negative    Blood, UA Small (1+) (A) Negative    Protein,  mg/dL (2+) (A) Negative    Leuk Esterase, UA Negative Negative    Nitrite, UA Negative Negative    Urobilinogen, UA 0.2 E.U./dL 0.2 - 1.0 E.U./dL   Urinalysis, Microscopic Only - Indwelling Urethral Catheter    Collection Time: 12/09/24  5:19 PM    Specimen: Indwelling Urethral Catheter; Urine   Result Value Ref Range    RBC, UA 0-2 None Seen, 0-2 /HPF    WBC, UA 0-2 None Seen, 0-2 /HPF    Bacteria, UA None Seen None Seen /HPF    Squamous Epithelial Cells, UA 0-2 None Seen, 0-2 /HPF    Hyaline Casts, UA 7-12 None Seen /LPF    Methodology Manual Light Microscopy    Blood Gas, Arterial -    Collection Time: 12/09/24  5:21 PM    Specimen: Arterial Blood   Result Value Ref Range    Site Right Radial     Rico's Test Positive     pH, Arterial 6.810 (C) 7.350 - 7.450 pH units    pCO2, Arterial 17.4 (C) 35.0 - 48.0 mm Hg    pO2, Arterial 165.4 (H) 83.0 - 108.0 mm Hg    HCO3, Arterial 2.8 (L) 21.0 - 28.0 mmol/L    Base Excess, Arterial <-30.0 (L) 0.0 - 3.0  mmol/L    O2 Saturation, Arterial 97.1 94.0 - 98.0 %    Barometric Pressure for Blood Gas      Modality Room Air     FIO2 21 %    Notified Who Dr Guardado     Read Back No     Notified Time      Hemodilution No     PO2/FIO2 788 (H) 0 - 500   POC Lactate    Collection Time: 12/09/24  5:21 PM    Specimen: Blood   Result Value Ref Range    Lactate 3.0 (C) 0.2 - 2.0 mmol/L    Notified Time      Notified Who Dr Guardado     Read Back No    POC Creatinine    Collection Time: 12/09/24  5:21 PM    Specimen: Arterial Blood   Result Value Ref Range    Creatinine 2.67 (H) 0.60 - 1.30 mg/dL    eGFR 27.0 (L) >60.0 mL/min/1.73   POCT Electrolytes +HGB +HCT    Collection Time: 12/09/24  5:21 PM    Specimen: Arterial Blood   Result Value Ref Range    Sodium 121 (L) 138 - 146 mmol/L    POC Potassium 6.0 (H) 3.5 - 4.5 mmol/L    Ionized Calcium 1.30 1.15 - 1.33 mmol/L    Glucose >700 (C) 74 - 100 mg/dL    Hematocrit 50 38 - 51 %    Hemoglobin 16.8 12.0 - 17.0 g/dL    Notified Time      Notified Who Dr Guardado     Read Back No    POC Glucose Once    Collection Time: 12/09/24  5:21 PM    Specimen: Arterial Blood   Result Value Ref Range    Glucose >700 (C) 74 - 100 mg/dL    Notified Who Dr Guardado     Notified Time      Read Back No    Magnesium    Collection Time: 12/09/24  6:12 PM    Specimen: Blood   Result Value Ref Range    Magnesium 3.6 (H) 1.6 - 2.6 mg/dL   Phosphorus    Collection Time: 12/09/24  6:12 PM    Specimen: Blood   Result Value Ref Range    Phosphorus 12.2 (H) 2.5 - 4.5 mg/dL   POC Glucose Once    Collection Time: 12/09/24  6:20 PM    Specimen: Blood   Result Value Ref Range    Glucose >600 (C) 70 - 105 mg/dL     CT Head Without Contrast    Result Date: 12/9/2024  Impression: 1.No acute intracranial process identified. Please note that there is a degree of motion artifact which limits image quality. Electronically Signed: Moses Gibson MD  12/9/2024 6:08 PM EST  Workstation ID: [id number]    XR Chest 1 View    Result Date:  12/9/2024  Impression: No acute process identified Electronically Signed: Moses Gibson MD  12/9/2024 6:04 PM EST  Workstation ID: [id number]                                        My interpretation of EKG shows sinus rhythm, rate of 79, no STEMI, artifact present            Medical Decision Making    Patient had the above evaluation.  Results were discussed with the patient and family.  Patient was very hypothermic on arrival with a rectal temperature of 86.8.  He was placed on a Pedro hugger.  A temperature-sensing Dixon catheter was placed.  Patient was initially unresponsive but his mental status slowly improved throughout his ER stay.  He was suspected to have DKA and DKA protocol was initiated.  Initial ABG shows pH 6.81, pCO2 of 17.4, pO2 of 165.4 and bicarb of 2.8.  Blood glucose is greater than 700.  Initial CMP was hemolyzed and is still pending.  CT head showed no acute intracranial abnormality.  My interpretation of chest x-ray shows no infiltrate or effusion.  Lactic acid was elevated at 3.0 and white blood cell count was 33.52.  Patient was given IV antibiotics.  Blood cultures were obtained.  Respiratory panel shows patient is positive for rhinovirus.  No bacterial source of infection identified at this time.  Urinalysis shows no UTI.  I discussed with the nurse practitioner on-call for the intensivist and the patient will be admitted to the ICU.  Critical care time totaled 40 minutes.    Final diagnoses:   Diabetic ketoacidosis with coma associated with type 1 diabetes mellitus   Hypothermia, initial encounter   Leukocytosis, unspecified type   Rhinovirus infection       ED Disposition  ED Disposition       ED Disposition   Decision to Admit    Condition   --    Comment   Level of Care: Critical Care [6]   Admitting Physician: RUPA ONEAL [[id number]]   Attending Physician: RUPA ONEAL [[id number]]                 No follow-up provider specified.       Medication List      No changes were made to your  prescriptions during this visit.            Adrian Guardado MD  12/09/24 1949

## 2024-12-10 LAB
ALBUMIN SERPL-MCNC: 3.8 G/DL (ref 3.5–5.2)
ALBUMIN/GLOB SERPL: 1.4 G/DL
ALP SERPL-CCNC: 114 U/L (ref 39–117)
ALT SERPL W P-5'-P-CCNC: 16 U/L (ref 1–41)
ANION GAP SERPL CALCULATED.3IONS-SCNC: 10.2 MMOL/L (ref 5–15)
ANION GAP SERPL CALCULATED.3IONS-SCNC: 12.9 MMOL/L (ref 5–15)
ANION GAP SERPL CALCULATED.3IONS-SCNC: 13.9 MMOL/L (ref 5–15)
ANION GAP SERPL CALCULATED.3IONS-SCNC: 15.4 MMOL/L (ref 5–15)
ANION GAP SERPL CALCULATED.3IONS-SCNC: 18.8 MMOL/L (ref 5–15)
ANION GAP SERPL CALCULATED.3IONS-SCNC: 32.8 MMOL/L (ref 5–15)
AST SERPL-CCNC: 24 U/L (ref 1–40)
ATMOSPHERIC PRESS: ABNORMAL MM[HG]
BACTERIA BLD CULT: ABNORMAL
BASE EXCESS BLDV CALC-SCNC: -11.9 MMOL/L (ref -2–2)
BASOPHILS # BLD AUTO: 0.04 10*3/MM3 (ref 0–0.2)
BASOPHILS NFR BLD AUTO: 0.2 % (ref 0–1.5)
BDY SITE: ABNORMAL
BILIRUB SERPL-MCNC: 0.3 MG/DL (ref 0–1.2)
BOTTLE TYPE: ABNORMAL
BUN SERPL-MCNC: 52 MG/DL (ref 6–20)
BUN SERPL-MCNC: 53 MG/DL (ref 6–20)
BUN SERPL-MCNC: 54 MG/DL (ref 6–20)
BUN/CREAT SERPL: 16.7 (ref 7–25)
BUN/CREAT SERPL: 18.1 (ref 7–25)
BUN/CREAT SERPL: 18.3 (ref 7–25)
BUN/CREAT SERPL: 19.5 (ref 7–25)
BUN/CREAT SERPL: 19.9 (ref 7–25)
BUN/CREAT SERPL: 20.9 (ref 7–25)
CALCIUM SPEC-SCNC: 9.2 MG/DL (ref 8.6–10.5)
CALCIUM SPEC-SCNC: 9.5 MG/DL (ref 8.6–10.5)
CALCIUM SPEC-SCNC: 9.5 MG/DL (ref 8.6–10.5)
CALCIUM SPEC-SCNC: 9.8 MG/DL (ref 8.6–10.5)
CHLORIDE SERPL-SCNC: 108 MMOL/L (ref 98–107)
CHLORIDE SERPL-SCNC: 111 MMOL/L (ref 98–107)
CHLORIDE SERPL-SCNC: 112 MMOL/L (ref 98–107)
CHLORIDE SERPL-SCNC: 115 MMOL/L (ref 98–107)
CHLORIDE SERPL-SCNC: 115 MMOL/L (ref 98–107)
CHLORIDE SERPL-SCNC: 97 MMOL/L (ref 98–107)
CO2 BLDA-SCNC: 13.9 MMOL/L (ref 22–29)
CO2 SERPL-SCNC: 14.1 MMOL/L (ref 22–29)
CO2 SERPL-SCNC: 14.2 MMOL/L (ref 22–29)
CO2 SERPL-SCNC: 15.1 MMOL/L (ref 22–29)
CO2 SERPL-SCNC: 15.6 MMOL/L (ref 22–29)
CO2 SERPL-SCNC: 18.8 MMOL/L (ref 22–29)
CO2 SERPL-SCNC: 6.2 MMOL/L (ref 22–29)
CREAT SERPL-MCNC: 2.54 MG/DL (ref 0.76–1.27)
CREAT SERPL-MCNC: 2.66 MG/DL (ref 0.76–1.27)
CREAT SERPL-MCNC: 2.67 MG/DL (ref 0.76–1.27)
CREAT SERPL-MCNC: 2.89 MG/DL (ref 0.76–1.27)
CREAT SERPL-MCNC: 2.93 MG/DL (ref 0.76–1.27)
CREAT SERPL-MCNC: 3.24 MG/DL (ref 0.76–1.27)
D-LACTATE SERPL-SCNC: 1.7 MMOL/L (ref 0.5–2)
D-LACTATE SERPL-SCNC: 2.3 MMOL/L (ref 0.5–2)
DEPRECATED RDW RBC AUTO: 40.3 FL (ref 37–54)
EGFRCR SERPLBLD CKD-EPI 2021: 21.4 ML/MIN/1.73
EGFRCR SERPLBLD CKD-EPI 2021: 24.2 ML/MIN/1.73
EGFRCR SERPLBLD CKD-EPI 2021: 24.6 ML/MIN/1.73
EGFRCR SERPLBLD CKD-EPI 2021: 27 ML/MIN/1.73
EGFRCR SERPLBLD CKD-EPI 2021: 27.1 ML/MIN/1.73
EGFRCR SERPLBLD CKD-EPI 2021: 28.7 ML/MIN/1.73
EOSINOPHIL # BLD AUTO: 0 10*3/MM3 (ref 0–0.4)
EOSINOPHIL NFR BLD AUTO: 0 % (ref 0.3–6.2)
ERYTHROCYTE [DISTWIDTH] IN BLOOD BY AUTOMATED COUNT: 12.1 % (ref 12.3–15.4)
GLOBULIN UR ELPH-MCNC: 2.7 GM/DL
GLUCOSE BLDC GLUCOMTR-MCNC: 115 MG/DL (ref 70–105)
GLUCOSE BLDC GLUCOMTR-MCNC: 121 MG/DL (ref 70–105)
GLUCOSE BLDC GLUCOMTR-MCNC: 158 MG/DL (ref 70–105)
GLUCOSE BLDC GLUCOMTR-MCNC: 161 MG/DL (ref 70–105)
GLUCOSE BLDC GLUCOMTR-MCNC: 169 MG/DL (ref 70–105)
GLUCOSE BLDC GLUCOMTR-MCNC: 191 MG/DL (ref 70–105)
GLUCOSE BLDC GLUCOMTR-MCNC: 194 MG/DL (ref 70–105)
GLUCOSE BLDC GLUCOMTR-MCNC: 202 MG/DL (ref 70–105)
GLUCOSE BLDC GLUCOMTR-MCNC: 219 MG/DL (ref 70–105)
GLUCOSE BLDC GLUCOMTR-MCNC: 237 MG/DL (ref 70–105)
GLUCOSE BLDC GLUCOMTR-MCNC: 289 MG/DL (ref 70–105)
GLUCOSE BLDC GLUCOMTR-MCNC: 368 MG/DL (ref 70–105)
GLUCOSE BLDC GLUCOMTR-MCNC: 396 MG/DL (ref 70–105)
GLUCOSE BLDC GLUCOMTR-MCNC: 423 MG/DL (ref 70–105)
GLUCOSE BLDC GLUCOMTR-MCNC: 77 MG/DL (ref 70–105)
GLUCOSE BLDC GLUCOMTR-MCNC: 86 MG/DL (ref 70–105)
GLUCOSE BLDC GLUCOMTR-MCNC: >600 MG/DL (ref 70–105)
GLUCOSE SERPL-MCNC: 143 MG/DL (ref 65–99)
GLUCOSE SERPL-MCNC: 210 MG/DL (ref 65–99)
GLUCOSE SERPL-MCNC: 220 MG/DL (ref 65–99)
GLUCOSE SERPL-MCNC: 328 MG/DL (ref 65–99)
GLUCOSE SERPL-MCNC: 705 MG/DL (ref 65–99)
GLUCOSE SERPL-MCNC: 87 MG/DL (ref 65–99)
HCO3 BLDV-SCNC: 13.1 MMOL/L (ref 22–26)
HCT VFR BLD AUTO: 38 % (ref 37.5–51)
HGB BLD-MCNC: 13.7 G/DL (ref 13–17.7)
IMM GRANULOCYTES # BLD AUTO: 0.22 10*3/MM3 (ref 0–0.05)
IMM GRANULOCYTES NFR BLD AUTO: 1.1 % (ref 0–0.5)
INHALED O2 CONCENTRATION: 21 %
LYMPHOCYTES # BLD AUTO: 1.54 10*3/MM3 (ref 0.7–3.1)
LYMPHOCYTES NFR BLD AUTO: 7.9 % (ref 19.6–45.3)
MAGNESIUM SERPL-MCNC: 2.4 MG/DL (ref 1.6–2.6)
MAGNESIUM SERPL-MCNC: 2.4 MG/DL (ref 1.6–2.6)
MAGNESIUM SERPL-MCNC: 2.5 MG/DL (ref 1.6–2.6)
MAGNESIUM SERPL-MCNC: 3 MG/DL (ref 1.6–2.6)
MCH RBC QN AUTO: 33 PG (ref 26.6–33)
MCHC RBC AUTO-ENTMCNC: 36.1 G/DL (ref 31.5–35.7)
MCV RBC AUTO: 91.6 FL (ref 79–97)
MODALITY: ABNORMAL
MONOCYTES # BLD AUTO: 1.61 10*3/MM3 (ref 0.1–0.9)
MONOCYTES NFR BLD AUTO: 8.2 % (ref 5–12)
NEUTROPHILS NFR BLD AUTO: 16.17 10*3/MM3 (ref 1.7–7)
NEUTROPHILS NFR BLD AUTO: 82.6 % (ref 42.7–76)
NRBC BLD AUTO-RTO: 0 /100 WBC (ref 0–0.2)
PCO2 BLDV: 25.9 MM HG (ref 42–51)
PH BLDV: 7.31 PH UNITS (ref 7.32–7.43)
PHOSPHATE SERPL-MCNC: 1.5 MG/DL (ref 2.5–4.5)
PHOSPHATE SERPL-MCNC: 1.8 MG/DL (ref 2.5–4.5)
PHOSPHATE SERPL-MCNC: 2.3 MG/DL (ref 2.5–4.5)
PHOSPHATE SERPL-MCNC: 4.4 MG/DL (ref 2.5–4.5)
PLATELET # BLD AUTO: 276 10*3/MM3 (ref 140–450)
PMV BLD AUTO: 10.5 FL (ref 6–12)
PO2 BLDV: 40.6 MM HG (ref 40–42)
POTASSIUM SERPL-SCNC: 4.2 MMOL/L (ref 3.5–5.2)
POTASSIUM SERPL-SCNC: 4.3 MMOL/L (ref 3.5–5.2)
POTASSIUM SERPL-SCNC: 4.4 MMOL/L (ref 3.5–5.2)
POTASSIUM SERPL-SCNC: 4.6 MMOL/L (ref 3.5–5.2)
PROT SERPL-MCNC: 6.5 G/DL (ref 6–8.5)
QT INTERVAL: 405 MS
QTC INTERVAL: 465 MS
RBC # BLD AUTO: 4.15 10*6/MM3 (ref 4.14–5.8)
SAO2 % BLDCOV: 72.3 % (ref 45–75)
SODIUM SERPL-SCNC: 136 MMOL/L (ref 136–145)
SODIUM SERPL-SCNC: 141 MMOL/L (ref 136–145)
SODIUM SERPL-SCNC: 141 MMOL/L (ref 136–145)
SODIUM SERPL-SCNC: 142 MMOL/L (ref 136–145)
SODIUM SERPL-SCNC: 142 MMOL/L (ref 136–145)
SODIUM SERPL-SCNC: 144 MMOL/L (ref 136–145)
TROPONIN T SERPL HS-MCNC: 42 NG/L
WBC NRBC COR # BLD AUTO: 19.58 10*3/MM3 (ref 3.4–10.8)

## 2024-12-10 PROCEDURE — 63710000001 INSULIN GLARGINE PER 5 UNITS: Performed by: NURSE PRACTITIONER

## 2024-12-10 PROCEDURE — 84100 ASSAY OF PHOSPHORUS: CPT | Performed by: EMERGENCY MEDICINE

## 2024-12-10 PROCEDURE — 25010000002 HEPARIN (PORCINE) PER 1000 UNITS: Performed by: EMERGENCY MEDICINE

## 2024-12-10 PROCEDURE — 85025 COMPLETE CBC W/AUTO DIFF WBC: CPT | Performed by: NURSE PRACTITIONER

## 2024-12-10 PROCEDURE — 25810000003 SODIUM CHLORIDE 0.9 % SOLUTION: Performed by: NURSE PRACTITIONER

## 2024-12-10 PROCEDURE — 93005 ELECTROCARDIOGRAM TRACING: CPT | Performed by: NURSE PRACTITIONER

## 2024-12-10 PROCEDURE — 25010000002 FUROSEMIDE PER 20 MG: Performed by: NURSE PRACTITIONER

## 2024-12-10 PROCEDURE — 83735 ASSAY OF MAGNESIUM: CPT | Performed by: NURSE PRACTITIONER

## 2024-12-10 PROCEDURE — 80053 COMPREHEN METABOLIC PANEL: CPT | Performed by: NURSE PRACTITIONER

## 2024-12-10 PROCEDURE — 25010000002 THIAMINE HCL 200 MG/2ML SOLUTION: Performed by: NURSE PRACTITIONER

## 2024-12-10 PROCEDURE — 63710000001 INSULIN LISPRO (HUMAN) PER 5 UNITS: Performed by: NURSE PRACTITIONER

## 2024-12-10 PROCEDURE — 82803 BLOOD GASES ANY COMBINATION: CPT | Performed by: NURSE PRACTITIONER

## 2024-12-10 PROCEDURE — 83735 ASSAY OF MAGNESIUM: CPT | Performed by: EMERGENCY MEDICINE

## 2024-12-10 PROCEDURE — 25010000002 FENTANYL CITRATE (PF) 50 MCG/ML SOLUTION

## 2024-12-10 PROCEDURE — 25010000002 SODIUM CHLORIDE 0.45 % WITH KCL 20 MEQ 20-0.45 MEQ/L-% SOLUTION: Performed by: EMERGENCY MEDICINE

## 2024-12-10 PROCEDURE — 83605 ASSAY OF LACTIC ACID: CPT | Performed by: EMERGENCY MEDICINE

## 2024-12-10 PROCEDURE — 25010000002 ZIPRASIDONE MESYLATE PER 10 MG: Performed by: NURSE PRACTITIONER

## 2024-12-10 PROCEDURE — 82948 REAGENT STRIP/BLOOD GLUCOSE: CPT

## 2024-12-10 PROCEDURE — 84484 ASSAY OF TROPONIN QUANT: CPT | Performed by: NURSE PRACTITIONER

## 2024-12-10 PROCEDURE — 84100 ASSAY OF PHOSPHORUS: CPT | Performed by: NURSE PRACTITIONER

## 2024-12-10 PROCEDURE — 25010000002 HYDRALAZINE PER 20 MG: Performed by: NURSE PRACTITIONER

## 2024-12-10 PROCEDURE — 25010000002 CEFTRIAXONE PER 250 MG: Performed by: EMERGENCY MEDICINE

## 2024-12-10 PROCEDURE — 93010 ELECTROCARDIOGRAM REPORT: CPT | Performed by: INTERNAL MEDICINE

## 2024-12-10 RX ORDER — ACETAMINOPHEN 325 MG/1
650 TABLET ORAL EVERY 6 HOURS PRN
Status: DISCONTINUED | OUTPATIENT
Start: 2024-12-10 | End: 2024-12-16 | Stop reason: HOSPADM

## 2024-12-10 RX ORDER — NOREPINEPHRINE BITARTRATE 0.03 MG/ML
.02-.3 INJECTION, SOLUTION INTRAVENOUS
Status: DISCONTINUED | OUTPATIENT
Start: 2024-12-10 | End: 2024-12-11

## 2024-12-10 RX ORDER — FENTANYL CITRATE 50 UG/ML
50 INJECTION, SOLUTION INTRAMUSCULAR; INTRAVENOUS ONCE
Status: COMPLETED | OUTPATIENT
Start: 2024-12-10 | End: 2024-12-10

## 2024-12-10 RX ORDER — FUROSEMIDE 10 MG/ML
40 INJECTION INTRAMUSCULAR; INTRAVENOUS ONCE
Status: COMPLETED | OUTPATIENT
Start: 2024-12-10 | End: 2024-12-10

## 2024-12-10 RX ORDER — THIAMINE HYDROCHLORIDE 100 MG/ML
200 INJECTION, SOLUTION INTRAMUSCULAR; INTRAVENOUS EVERY 8 HOURS SCHEDULED
Status: DISCONTINUED | OUTPATIENT
Start: 2024-12-10 | End: 2024-12-11

## 2024-12-10 RX ORDER — FENTANYL/ROPIVACAINE/NS/PF 2-625MCG/1
15 PLASTIC BAG, INJECTION (ML) EPIDURAL
Status: COMPLETED | OUTPATIENT
Start: 2024-12-10 | End: 2024-12-10

## 2024-12-10 RX ORDER — HYDRALAZINE HYDROCHLORIDE 20 MG/ML
10 INJECTION INTRAMUSCULAR; INTRAVENOUS ONCE
Status: COMPLETED | OUTPATIENT
Start: 2024-12-10 | End: 2024-12-10

## 2024-12-10 RX ORDER — INSULIN LISPRO 100 [IU]/ML
2-9 INJECTION, SOLUTION INTRAVENOUS; SUBCUTANEOUS EVERY 6 HOURS SCHEDULED
Status: DISCONTINUED | OUTPATIENT
Start: 2024-12-10 | End: 2024-12-11

## 2024-12-10 RX ORDER — ATORVASTATIN CALCIUM 10 MG/1
10 TABLET, FILM COATED ORAL DAILY
Status: DISCONTINUED | OUTPATIENT
Start: 2024-12-10 | End: 2024-12-11

## 2024-12-10 RX ORDER — DEXTROSE MONOHYDRATE 25 G/50ML
25 INJECTION, SOLUTION INTRAVENOUS
Status: DISCONTINUED | OUTPATIENT
Start: 2024-12-10 | End: 2024-12-16 | Stop reason: HOSPADM

## 2024-12-10 RX ORDER — HEPARIN SODIUM 5000 [USP'U]/ML
5000 INJECTION, SOLUTION INTRAVENOUS; SUBCUTANEOUS EVERY 8 HOURS SCHEDULED
Status: DISCONTINUED | OUTPATIENT
Start: 2024-12-10 | End: 2024-12-15

## 2024-12-10 RX ORDER — NICOTINE POLACRILEX 4 MG
15 LOZENGE BUCCAL
Status: DISCONTINUED | OUTPATIENT
Start: 2024-12-10 | End: 2024-12-16 | Stop reason: HOSPADM

## 2024-12-10 RX ORDER — IBUPROFEN 600 MG/1
1 TABLET ORAL
Status: DISCONTINUED | OUTPATIENT
Start: 2024-12-10 | End: 2024-12-16 | Stop reason: HOSPADM

## 2024-12-10 RX ORDER — FENTANYL CITRATE 50 UG/ML
50 INJECTION, SOLUTION INTRAMUSCULAR; INTRAVENOUS
Status: DISCONTINUED | OUTPATIENT
Start: 2024-12-10 | End: 2024-12-10

## 2024-12-10 RX ORDER — CARVEDILOL 3.12 MG/1
3.12 TABLET ORAL 2 TIMES DAILY WITH MEALS
Status: DISCONTINUED | OUTPATIENT
Start: 2024-12-10 | End: 2024-12-10

## 2024-12-10 RX ORDER — ASPIRIN 81 MG/1
81 TABLET ORAL DAILY
Status: DISCONTINUED | OUTPATIENT
Start: 2024-12-10 | End: 2024-12-11

## 2024-12-10 RX ADMIN — MUPIROCIN 1 APPLICATION: 20 OINTMENT TOPICAL at 10:18

## 2024-12-10 RX ADMIN — Medication 10 ML: at 10:17

## 2024-12-10 RX ADMIN — Medication 10 ML: at 20:08

## 2024-12-10 RX ADMIN — ACETAMINOPHEN 650 MG: 325 TABLET, FILM COATED ORAL at 18:36

## 2024-12-10 RX ADMIN — FUROSEMIDE 40 MG: 10 INJECTION, SOLUTION INTRAMUSCULAR; INTRAVENOUS at 14:11

## 2024-12-10 RX ADMIN — MUPIROCIN 1 APPLICATION: 20 OINTMENT TOPICAL at 20:08

## 2024-12-10 RX ADMIN — INSULIN HUMAN 4.9 UNITS/HR: 1 INJECTION, SOLUTION INTRAVENOUS at 08:18

## 2024-12-10 RX ADMIN — FENTANYL CITRATE 50 MCG: 50 INJECTION, SOLUTION INTRAMUSCULAR; INTRAVENOUS at 18:36

## 2024-12-10 RX ADMIN — THIAMINE HYDROCHLORIDE 200 MG: 100 INJECTION, SOLUTION INTRAMUSCULAR; INTRAVENOUS at 13:25

## 2024-12-10 RX ADMIN — INSULIN GLARGINE 30 UNITS: 100 INJECTION, SOLUTION SUBCUTANEOUS at 17:50

## 2024-12-10 RX ADMIN — HYDRALAZINE HYDROCHLORIDE 10 MG: 20 INJECTION INTRAMUSCULAR; INTRAVENOUS at 23:02

## 2024-12-10 RX ADMIN — SODIUM CHLORIDE 1000 ML: 9 INJECTION, SOLUTION INTRAVENOUS at 05:05

## 2024-12-10 RX ADMIN — POTASSIUM CHLORIDE, DEXTROSE MONOHYDRATE AND SODIUM CHLORIDE 125 ML/HR: 150; 5; 450 INJECTION, SOLUTION INTRAVENOUS at 05:55

## 2024-12-10 RX ADMIN — Medication 10 ML: at 10:18

## 2024-12-10 RX ADMIN — DEXMEDETOMIDINE HYDROCHLORIDE 1.5 MCG/KG/HR: 4 INJECTION, SOLUTION INTRAVENOUS at 12:25

## 2024-12-10 RX ADMIN — FOLIC ACID 1 MG: 5 INJECTION, SOLUTION INTRAMUSCULAR; INTRAVENOUS; SUBCUTANEOUS at 10:17

## 2024-12-10 RX ADMIN — ZIPRASIDONE MESYLATE 10 MG: 20 INJECTION, POWDER, LYOPHILIZED, FOR SOLUTION INTRAMUSCULAR at 00:31

## 2024-12-10 RX ADMIN — SODIUM CHLORIDE 15 MMOL: 9 INJECTION, SOLUTION INTRAVENOUS at 17:58

## 2024-12-10 RX ADMIN — SODIUM CHLORIDE AND POTASSIUM CHLORIDE 200 ML/HR: 4.5; 1.49 INJECTION, SOLUTION INTRAVENOUS at 03:03

## 2024-12-10 RX ADMIN — SODIUM CHLORIDE 15 MMOL: 9 INJECTION, SOLUTION INTRAVENOUS at 20:31

## 2024-12-10 RX ADMIN — SODIUM CHLORIDE 500 ML: 9 INJECTION, SOLUTION INTRAVENOUS at 01:02

## 2024-12-10 RX ADMIN — THIAMINE HYDROCHLORIDE 200 MG: 100 INJECTION, SOLUTION INTRAMUSCULAR; INTRAVENOUS at 22:18

## 2024-12-10 RX ADMIN — NOREPINEPHRINE BITARTRATE 0.02 MCG/KG/MIN: 0.03 INJECTION, SOLUTION INTRAVENOUS at 05:35

## 2024-12-10 RX ADMIN — INSULIN LISPRO 4 UNITS: 100 INJECTION, SOLUTION INTRAVENOUS; SUBCUTANEOUS at 17:50

## 2024-12-10 RX ADMIN — SENNOSIDES AND DOCUSATE SODIUM 2 TABLET: 50; 8.6 TABLET ORAL at 20:08

## 2024-12-10 RX ADMIN — CEFTRIAXONE 2000 MG: 2 INJECTION, POWDER, FOR SOLUTION INTRAMUSCULAR; INTRAVENOUS at 11:28

## 2024-12-10 RX ADMIN — HEPARIN SODIUM 5000 UNITS: 5000 INJECTION INTRAVENOUS; SUBCUTANEOUS at 13:25

## 2024-12-10 RX ADMIN — HEPARIN SODIUM 5000 UNITS: 5000 INJECTION INTRAVENOUS; SUBCUTANEOUS at 22:18

## 2024-12-10 RX ADMIN — SODIUM BICARBONATE 100 MEQ: 84 INJECTION INTRAVENOUS at 14:41

## 2024-12-10 RX ADMIN — THIAMINE HYDROCHLORIDE 200 MG: 100 INJECTION, SOLUTION INTRAMUSCULAR; INTRAVENOUS at 05:38

## 2024-12-10 RX ADMIN — DEXMEDETOMIDINE HYDROCHLORIDE 0.8 MCG/KG/HR: 4 INJECTION, SOLUTION INTRAVENOUS at 06:42

## 2024-12-10 NOTE — PLAN OF CARE
Goal Outcome Evaluation:  New admit. DKA. Gluccomander protocol started. At beginning of shift, pt had been A/O x3. Very restless requiring restraints and dex to be added. Pt. Hypotensive requiring levo after dex stopped. Pt. Now easily agitated again.

## 2024-12-10 NOTE — H&P
Critical Care History and Physical     Colten Yanez Jr. : 1965 MRN:[id number] LOS:0 ROOM: [facility]/     Reason for admission: DKA (diabetic ketoacidosis)     Assessment / Plan     Diabetic ketoacidosis, in type 1.5 diabetic  Found unresponsive -improving  Hypothermia -improving  AMS  Patient found down by EMS with core temperature of 88.9  Transferred to [facility] ED, placed on Pedro hugger  Found to be in DKA; protocol initiated  IV fluids and insulin drip -transition to SSI when patient meets criteria  CK pending  Initial temperature 86.8--> 92.3    Severe Sepsis: ( WBC>12 or <4 or >10% bands, Temp > 100.4 or < 96.8, Lactic acid>2, and Change in mental status )  Bacterial infectious source not identified; patient + Rhinovirus   Patient received empiric cefepime in ED; will continue for now  Blood cultures pending  Normotensive   C/w additional fluids as needed. Avoid fluid overload  Target MAP of 65    Acute kidney injury  Likely prerenal  Creatinine 3.25, baseline 1.11  IV fluids started per DKA protocol   Avoid nephrotoxic agents  Avoid hypotension    ? ETOH abuse  Hyponatremia  Hyperkalemia  Hypophosphatemia  Patient's mother at bedside, states he drinks 2 beers daily; patient extremely agitated states he does not drink alcohol  Fluids per DKA protocol  Replace electrolytes when needed    CAD  Resume home aspirin once able to take p.o.    Hyperlipidemia  Resume home atorvastatin once able to take p.o.      Nutrition:   NPO Diet NPO Type: Strict NPO     VTE Prophylaxis:  No VTE prophylaxis order currently exists.         History of Present illness     Colten Yanez Jr. is a 59 y.o. male with PMH of Diabetes mellitus type 1.5, dyslipidemia, vitamin D deficiency, CAD with history of stent placement presented to the hospital after being found down at home, and was admitted with a principal diagnosis of DKA (diabetic ketoacidosis).  Per patient for HPI taken from chart review as patient is lethargic  and very confused when he is awake.  Patient was found down by neighbors on his porch earlier this morning.  EMS was called and patient was unresponsive with a core temperature of 88.6.  Upon arrival to the emergency department he was placed on a Pedro hugger and a temperature sensing Dixon was placed.  Initially patient was unresponsive but his mental status improved with IV fluid resuscitation.  Lab work obtained was consistent with DKA and thus DKA protocol with insulin drip was initiated.  CT head was obtained and negative of any abnormality.  CXR was clear.  Lactic acid was elevated at 3.0 and patient had white count of 33.52.  Patient was given empiric antibiotics and blood cultures were obtained.  RVP was + rhinovirus.  Urinalysis was negative for UTI.  No clear bacterial source of infection could be identified.  Patient's mother at bedside states that he drinks probably 2 beers a day.  Patient awake but agitated trying to get up out of bed.  When asked if he drinks alcohol he says no.    ACP: Patient does not have advanced directive on file at this facility.  Patient unable to participate in HPI due to confusion.  Patient's mother at bedside states he is a full code.    Patient was seen and examined on 12/09/24 at 21:10 EST .      Past Medical/Surgical/Social/Family History & Allergies     Past Medical History:   Diagnosis Date    Asthma     Coronary artery disease     Diabetes 1.5, managed as type 1     Hyperlipidemia       Past Surgical History:   Procedure Laterality Date    APPENDECTOMY      CARDIAC CATHETERIZATION      CORONARY ANGIOPLASTY WITH STENT PLACEMENT      SINUS SURGERY      TONSILLECTOMY        Social History     Socioeconomic History    Marital status: Single   Tobacco Use    Smoking status: Never    Smokeless tobacco: Former     Types: Chew     Quit date: 2017   Vaping Use    Vaping status: Never Used   Substance and Sexual Activity    Alcohol use: Yes     Comment: occasionally    Drug use:  No    Sexual activity: Defer      Family History   Problem Relation Age of Onset    Diabetes Mother     Heart disease Mother     Heart disease Father     Heart disease Maternal Grandmother     Diabetes Maternal Grandmother     Stroke Maternal Grandmother     Heart disease Maternal Grandfather     Diabetes Maternal Grandfather     Stroke Maternal Grandfather     Stroke Paternal Grandmother     Heart disease Paternal Grandmother     Diabetes Paternal Grandmother     Stroke Paternal Grandfather     Diabetes Paternal Grandfather     Heart disease Paternal Grandfather       No Known Allergies   Social Drivers of Health     Tobacco Use: Medium Risk (10/2/2023)    Patient History     Smoking Tobacco Use: Never     Smokeless Tobacco Use: Former     Passive Exposure: Not on file   Alcohol Use: Not on file   Financial Resource Strain: Not on file   Food Insecurity: Not on file   Transportation Needs: Not on file   Physical Activity: Not on file   Stress: Not on file   Social Connections: Not on file   Interpersonal Safety: Not At Risk (12/9/2024)    Abuse Screen     Unsafe at Home or Work/School: no     Feels Threatened by Someone?: no     Does Anyone Keep You from Contacting Others or Doint Things Outside the Home?: no     Physical Sign of Abuse Present: no   Depression: Not on file   Housing Stability: Not on file   Utilities: Not on file   Health Literacy: Not on file   Employment: Not on file   Disabilities: Not on file        Home Medications     Prior to Admission medications    Medication Sig Start Date End Date Taking? Authorizing Provider   Accu-Chek FastClix Lancets misc TEST BLOOD GLUCOSE FOUR TIMES DAILY AS DIRECTED 7/16/20   Beto Lee MD   Alcohol Swabs (B-D SINGLE USE SWABS REGULAR) pads TEST BLOOD GLUCOSE FOUR TIMES DAILY AS DIRECTED 7/16/20   Beto Lee MD   aspirin 81 MG EC tablet ASPIRIN 81 TBEC 11/8/17   Provider, MD Rylee   atorvastatin (LIPITOR) 10 MG tablet Take 1 tablet by mouth  Daily. 10/2/23   Beto Lee MD   Blood Glucose Monitoring Suppl (ACCU-CHEK GUIDE) w/Device kit TEST FOUR TIMES DAILY AS DIRECTED 7/16/20   Beto Lee MD   carvedilol (COREG) 3.125 MG tablet TAKE 1 TABLET BY MOUTH TWICE A DAY 10/2/23   Beto Lee MD   Continuous Blood Gluc  (DEXCOM G6 ) device 1 each Continuous. Pt to use to monitor his blood sugars 11/18/19   Beto Lee MD   Continuous Blood Gluc Sensor (Dexcom G7 Sensor) misc Use 1 Device Every 10 (Ten) Days. 10/2/23   Beto Lee MD   Glucagon (Baqsimi One Pack) 3 MG/DOSE powder 3 mg into the nostril(s) as directed by provider As Needed (in case of severe hypoglycemia). 10/2/23   Beto Lee MD   glucose blood (Accu-Chek Guide) test strip To check blood sugar daily. 10/2/23   Beto Lee MD   insulin degludec (Tresiba FlexTouch) 100 UNIT/ML solution pen-injector injection Inject 30 Units under the skin into the appropriate area as directed Every Night. 10/2/23   Beto Lee MD   Insulin Pen Needle (NOVOFINE) 32G X 6 MM misc Use with insulin pen 4x/d 10/30/19   Beto Lee MD   Loratadine (CLARITIN PO) Take  by mouth Daily.    Provider, MD Rylee   NovoLOG FlexPen 100 UNIT/ML solution pen-injector sc pen Inject 15 units ac tid plus sliding scale. MDD 60 units daily 10/2/23   Beto Lee MD   vitamin D (ERGOCALCIFEROL) 1.25 MG (10151 UT) capsule capsule TAKE 1 CAPSULE BY MOUTH ONE TIME PER WEEK 4/20/23   Beto Lee MD        Objective / Physical Exam     Vital signs:  Temp: 92.3 °F (33.5 °C)  BP: 105/62  Heart Rate: 103  Resp: 16  SpO2: 98 %  Weight: 86.5 kg (190 lb 11.2 oz)    Admission Weight: Weight: 86.5 kg (190 lb 11.2 oz)    Physical Exam  Constitutional:       Appearance: He is normal weight. He is ill-appearing.   HENT:      Head: Normocephalic.      Nose: Nose normal.      Mouth/Throat:      Mouth: Mucous membranes are dry.   Eyes:      Extraocular  Movements: Extraocular movements intact.      Pupils: Pupils are equal, round, and reactive to light.   Cardiovascular:      Rate and Rhythm: Regular rhythm. Tachycardia present.      Pulses: Normal pulses.      Heart sounds: Normal heart sounds.   Pulmonary:      Effort: Tachypnea present.      Breath sounds: Normal breath sounds.      Comments: Kussmaul breathing pattern  Abdominal:      General: Bowel sounds are normal. There is distension.      Palpations: Abdomen is soft.   Musculoskeletal:         General: Normal range of motion.   Skin:     General: Skin is warm and dry.      Coloration: Skin is pale.   Neurological:      General: No focal deficit present.      Mental Status: He is lethargic and disoriented.      Comments: Patient awakes from deep sleep and is disoriented and anxious.     Psychiatric:         Attention and Perception: He is inattentive.         Mood and Affect: Mood is anxious. Affect is angry.         Behavior: Behavior is agitated and aggressive.          Labs     Results from last 7 days   Lab Units 12/09/24  1721 12/09/24  1714   WBC 10*3/mm3  --  33.52*   HEMATOCRIT %  --  49.5   HEMATOCRIT POC % 50  --    PLATELETS 10*3/mm3  --  399      Results from last 7 days   Lab Units 12/09/24  1906 12/09/24  1721   SODIUM mmol/L 128*  --    POTASSIUM mmol/L 5.6*  --    CHLORIDE mmol/L 88*  --    CO2 mmol/L 4.2*  --    BUN mg/dL 52*  --    CREATININE mg/dL 3.25* 2.67*        Imaging     Chest X ray: My independent assessment showed no infiltrates or effusions    EKG: My independent evaluation showed normal sinus rhythm, no ST -T changes    Current Medications     Scheduled Meds:  sodium chloride, 10 mL, Intravenous, Q12H         Continuous Infusions:  dextrose 5 % and sodium chloride 0.45 %, 125 mL/hr  dextrose 5 % and sodium chloride 0.45 % with KCl 20 mEq/L, 125 mL/hr  dextrose 5 % and sodium chloride 0.45 % with KCl 40 mEq/L, 125 mL/hr  dextrose 5 % and sodium chloride 0.9 %, 125  mL/hr  dextrose 5 % and sodium chloride 0.9 % with KCl 20 mEq, 125 mL/hr  dextrose 5% and sodium chloride 0.9% with KCl 40 mEq/L, 125 mL/hr  insulin, 0-100 Units/hr, Last Rate: 5.4 Units/hr (12/09/24 1951)  sodium chloride 0.45 % 1,000 mL with potassium chloride 40 mEq infusion, 200 mL/hr  sodium chloride, 200 mL/hr  sodium chloride 0.45 % with KCl 20 mEq, 200 mL/hr  sodium chloride, 200 mL/hr, Last Rate: 200 mL/hr (12/09/24 2054)  sodium chloride 0.9 % with KCl 20 mEq, 200 mL/hr  sodium chloride 0.9 % with KCl 40 mEq/L, 200 mL/hr       Patient continues to be critically ill, remains at risk of clinical deterioration or death and needed high complexity decision making. I have spent a total of 40 minutes providing critical care services to this patient including but not limited to: review of labs/ microbiology/imaging/medications, serial monitoring of vital signs, review of other consultant's notes, review of events in the last 24 hrs, monitoring input/output, review of treatment plan with bedside nurse, RT and other treatment team, management of life support and nutrition needs. I also spoke with patient mother about the plan of care and answered all questions.    Time spent in performing separately billable procedures and updating family is not included in the critical care time.       EFREN Mcguire   Critical Care  12/09/24   21:10 EST

## 2024-12-10 NOTE — CASE MANAGEMENT/SOCIAL WORK
Discharge Planning Assessment   Amrik     Patient Name: Colten Yanez Jr.  MRN: [id number]  Today's Date: 12/10/2024    Admit Date: 12/9/2024    Plan: From home alone, pending clinical course.   Discharge Needs Assessment       Row Name 12/10/24 0952       Living Environment    People in Home alone    Current Living Arrangements home    Potentially Unsafe Housing Conditions none    In the past 12 months has the electric, gas, oil, or water company threatened to shut off services in your home? No    Primary Care Provided by self    Provides Primary Care For no one    Family Caregiver if Needed parent(s)    Family Caregiver Names Ирина/Colten - parents    Quality of Family Relationships helpful;involved;supportive    Able to Return to Prior Arrangements yes       Resource/Environmental Concerns    Resource/Environmental Concerns none    Transportation Concerns none       Transportation Needs    In the past 12 months, has lack of transportation kept you from medical appointments or from getting medications? no    In the past 12 months, has lack of transportation kept you from meetings, work, or from getting things needed for daily living? No       Food Insecurity    Within the past 12 months, you worried that your food would run out before you got the money to buy more. Never true    Within the past 12 months, the food you bought just didn't last and you didn't have money to get more. Never true       Transition Planning    Patient/Family Anticipates Transition to home    Patient/Family Anticipated Services at Transition none    Transportation Anticipated car, drives self;family or friend will provide       Discharge Needs Assessment    Readmission Within the Last 30 Days no previous admission in last 30 days    Equipment Currently Used at Home none    Concerns to be Addressed discharge planning    Anticipated Changes Related to Illness none    Equipment Needed After Discharge none                   Discharge Plan        Row Name 12/10/24 0958       Plan    Plan From home alone, pending clinical course.    Patient/Family in Agreement with Plan yes    Plan Comments CM met with patient/mother at bedside. Patient is very drowsy and not able to answer questions. Patient lives at home alone, drives, currently not working.  Family will transport at discharge. Patient performs ADLs. PCP (mother states he has one but she is unsure of the name - CM to f/u when patient is more awake/alert) and pharmacy confirmed. Agreeable to M2B.  Denies financial assistance needs for medication and/or food. Denies any current DME, HH, Caregiver, or rehab services. DC Barriers: DKA protocol, CIWA protocol, NPO, FC, IV Abxs, restraints, Dex/Insulin gtt, DM educator following.                 Expected Discharge Date and Time       Expected Discharge Date Expected Discharge Time    Dec 12, 2024            Demographic Summary       Row Name 12/10/24 0919       General Information    Admission Type inpatient    Arrived From emergency department    Required Notices Provided Important Message from Medicare    Referral Source admission list    Reason for Consult discharge planning    Preferred Language English                   Functional Status       Row Name 12/10/24 0951       Functional Status    Usual Activity Tolerance excellent    Current Activity Tolerance moderate       Functional Status, IADL    Medications independent    Meal Preparation independent    Housekeeping independent    Laundry independent    Shopping independent       Mental Status    General Appearance WDL WDL       Mental Status Summary    Recent Changes in Mental Status/Cognitive Functioning no changes             JAMEL. Latoya Louis RN  ICU/CVU   O: [phone]  C: [phone]  [email or web address]

## 2024-12-10 NOTE — ED NOTES
Nursing report ED to floor  Colten Yanez Jr.  59 y.o.  male    HPI:   Chief Complaint   Patient presents with    Loss of Consciousness       Admitting doctor:   Oleg Veronica MD    Admitting diagnosis:   The primary encounter diagnosis was Diabetic ketoacidosis with coma associated with type 1 diabetes mellitus. Diagnoses of Hypothermia, initial encounter, Leukocytosis, unspecified type, and Rhinovirus infection were also pertinent to this visit.    Code status:   Current Code Status       Date Active Code Status Order ID Comments User Context       Not on file            Allergies:   Patient has no known allergies.    Isolation:  Droplet     Fall Risk:  Fall Risk Assessment was completed, and patient is at high risk for falls.   Predictive Model Details         27 (Low) Factor Value    Calculated 12/9/2024 20:12 Age 59    Risk of Fall Model Sandra Coma Scale 7     Active Peripheral IV Present     Imaging order in this encounter Present     Magnesium 3.6 mg/dL     Diastolic BP 62     Number of Distinct Medication Classes administered 3     Creatinine 2.67 mg/dL     Mehdi Scale not on file     Calcium not on file     Clinically Relevant Sex Not Female     Albumin not on file     Potassium 6 mmol/L     Respiratory Rate 16     Total Bilirubin not on file     Days after Admission 0.13     Chloride not on file     ALT not on file         Weight:       12/09/24  1722   Weight: 86.5 kg (190 lb 11.2 oz)       Intake and Output  No intake or output data in the 24 hours ending 12/09/24 2018    Diet:   Dietary Orders (From admission, onward)       Start     Ordered    12/09/24 1711  Patient is on Glucommander  Continuous        Question Answer Comment   Tray Note: Glucommander    Patient is on Glucommander: Yes        12/09/24 1711    12/09/24 1711  NPO Diet NPO Type: Strict NPO  Diet Effective Now        Question:  NPO Type  Answer:  Strict NPO    12/09/24 1711                     Most recent vitals:   Vitals:     12/09/24 1947 12/09/24 2001 12/09/24 2002 12/09/24 2014   BP:  105/62     BP Location:  Left arm     Patient Position:  Lying     Pulse: 100 100 100 103   Resp:  16     Temp: (!) 91 °F (32.8 °C) (!) 91.8 °F (33.2 °C) (!) 91.8 °F (33.2 °C) 92.3 °F (33.5 °C)   TempSrc:       SpO2: 100% 98% 98%    Weight:       Height:           Active LDAs/IV Access:   Lines, Drains & Airways       Active LDAs       Name Placement date Placement time Site Days    Peripheral IV 12/09/24 1714 Right Antecubital 12/09/24  1714  Antecubital  less than 1    Peripheral IV 12/09/24 1716 Left Antecubital 12/09/24  1716  Antecubital  less than 1    Urethral Catheter Temperature probe 16 Fr. 12/09/24  1719  -- less than 1                    Skin Condition:   Skin Assessments (last day)       None             Labs (abnormal labs have a star):   Labs Reviewed   RESPIRATORY PANEL PCR W/ COVID-19 (SARS-COV-2), NP SWAB IN UTM/VTP, 2 HR TAT - Abnormal; Notable for the following components:       Result Value    Human Rhinovirus/Enterovirus Detected (*)     All other components within normal limits    Narrative:     In the setting of a positive respiratory panel with a viral infection PLUS a negative procalcitonin without other underlying concern for bacterial infection, consider observing off antibiotics or discontinuation of antibiotics and continue supportive care. If the respiratory panel is positive for atypical bacterial infection (Bordetella pertussis, Chlamydophila pneumoniae, or Mycoplasma pneumoniae), consider antibiotic de-escalation to target atypical bacterial infection.   COMPREHENSIVE METABOLIC PANEL - Abnormal; Notable for the following components:    Glucose 1,172 (*)     BUN 52 (*)     Creatinine 3.25 (*)     Sodium 128 (*)     Potassium 5.6 (*)     Chloride 88 (*)     CO2 4.2 (*)     Alkaline Phosphatase 153 (*)     Anion Gap 35.8 (*)     eGFR 21.3 (*)     All other components within normal limits    Narrative:     GFR Normal  >60  Chronic Kidney Disease <60  Kidney Failure <15     MAGNESIUM - Abnormal; Notable for the following components:    Magnesium 3.6 (*)     All other components within normal limits   OSMOLALITY, SERUM - Abnormal; Notable for the following components:    Osmolality 386 (*)     All other components within normal limits   HEMOGLOBIN A1C - Abnormal; Notable for the following components:    Hemoglobin A1C 13.10 (*)     All other components within normal limits   PHOSPHORUS - Abnormal; Notable for the following components:    Phosphorus 12.2 (*)     All other components within normal limits   CBC WITH AUTO DIFFERENTIAL - Abnormal; Notable for the following components:    WBC 33.52 (*)     .0 (*)     MCH 33.4 (*)     MCHC 30.1 (*)     All other components within normal limits   URINALYSIS W/ MICROSCOPIC IF INDICATED (NO CULTURE) - Abnormal; Notable for the following components:    Glucose, UA >=1000 mg/dL (3+) (*)     Ketones, UA 15 mg/dL (1+) (*)     Blood, UA Small (1+) (*)     Protein,  mg/dL (2+) (*)     All other components within normal limits   BLOOD GAS, ARTERIAL - Abnormal; Notable for the following components:    pH, Arterial 6.810 (*)     pCO2, Arterial 17.4 (*)     pO2, Arterial 165.4 (*)     HCO3, Arterial 2.8 (*)     Base Excess, Arterial <-30.0 (*)     PO2/FIO2 788 (*)     All other components within normal limits   MANUAL DIFFERENTIAL - Abnormal; Notable for the following components:    Neutrophil % 80.0 (*)     Lymphocyte % 7.0 (*)     Neutrophils Absolute 27.49 (*)     Monocytes Absolute 3.02 (*)     Basophils Absolute 0.34 (*)     All other components within normal limits   POC LACTATE - Abnormal; Notable for the following components:    Lactate 3.0 (*)     All other components within normal limits   POCT GLUCOSE FINGERSTICK - Abnormal; Notable for the following components:    Glucose >600 (*)     All other components within normal limits   POCT CREATININE - Abnormal; Notable for the  following components:    Creatinine 2.67 (*)     eGFR 27.0 (*)     All other components within normal limits   ELECTROLYTES + H&H - Abnormal; Notable for the following components:    Sodium 121 (*)     POC Potassium 6.0 (*)     Glucose >700 (*)     All other components within normal limits   POCT GLUCOSE FINGERSTICK - Abnormal; Notable for the following components:    Glucose >700 (*)     All other components within normal limits   POCT GLUCOSE FINGERSTICK - Abnormal; Notable for the following components:    Glucose >600 (*)     All other components within normal limits   BLOOD CULTURE   BLOOD CULTURE   PHOSPHORUS   TROPONIN    Narrative:     High Sensitive Troponin T Reference Range:  <14.0 ng/L- Negative Female for AMI  <22.0 ng/L- Negative Male for AMI  >=14 - Abnormal Female indicating possible myocardial injury.  >=22 - Abnormal Male indicating possible myocardial injury.   Clinicians would have to utilize clinical acumen, EKG, Troponin, and serial changes to determine if it is an Acute Myocardial Infarction or myocardial injury due to an underlying chronic condition.        MAGNESIUM   SCAN SLIDE   URINALYSIS, MICROSCOPIC ONLY   LACTIC ACID, REFLEX   HIGH SENSITIVITIY TROPONIN T 2HR   BASIC METABOLIC PANEL   MAGNESIUM   PHOSPHORUS   CBC AND DIFFERENTIAL    Narrative:     The following orders were created for panel order CBC & Differential.  Procedure                               Abnormality         Status                     ---------                               -----------         ------                     CBC Auto Differential[[id number]]        Abnormal            Final result               Scan Slide[[id number]]                                       Final result                 Please view results for these tests on the individual orders.   EXTRA TUBES    Narrative:     The following orders were created for panel order Extra Tubes.  Procedure                               Abnormality         Status                      ---------                               -----------         ------                     Light Blue Top[[id number]]                                   Final result                 Please view results for these tests on the individual orders.   LIGHT BLUE TOP       LOC:  Patient is lethargic and disoriented at this time.     Telemetry:  Critical Care    Cardiac Monitoring Ordered: yes    EKG:   ECG 12 Lead Electrolyte Imbalance   Preliminary Result   HEART RATE=79  bpm   RR Biofusgh=959  ms   ND Azwhbopa=949  ms   P Horizontal Axis=221  deg   P Front Axis=64  deg   QRSD Swcflgvu=024  ms   QT Ykgbnxau=735  ms   SRrN=979  ms   QRS Axis=56  deg   T Wave Axis=147  deg   - ABNORMAL ECG -   Sinus rhythm   Nonspecific T abnormalities, lateral leads   ST elevation, consider inferior injury   Date and Time of Study:2024-12-09 17:18:26          Medications Given in the ED:   Medications   sodium chloride 0.9 % flush 10 mL (has no administration in time range)   sodium chloride 0.9 % flush 10 mL (has no administration in time range)   sodium chloride 0.9 % infusion 40 mL (has no administration in time range)   dextrose (GLUTOSE) oral gel 15 g (has no administration in time range)   dextrose (D50W) (25 g/50 mL) IV injection 10-50 mL (has no administration in time range)   Glucagon (GLUCAGEN) injection 1 mg (has no administration in time range)   sodium chloride 0.9 % bolus (0 mL/hr Intravenous Stopped 12/9/24 1953)   sodium chloride 0.9 % infusion (has no administration in time range)   sodium chloride 0.9 % with KCl 20 mEq/L infusion (has no administration in time range)   sodium chloride 0.9 % with KCl 40 mEq/L infusion (has no administration in time range)   dextrose 5 % and sodium chloride 0.9 % infusion (has no administration in time range)   dextrose 5 % and sodium chloride 0.9 % with KCl 20 mEq/L infusion (has no administration in time range)   dextrose 5 % and sodium chloride 0.9 % with KCl 40 mEq/L infusion  (has no administration in time range)   sodium chloride 0.45 % infusion (has no administration in time range)   sodium chloride 0.45 % with KCl 20 mEq/L infusion (has no administration in time range)   sodium chloride 0.45 % 1,000 mL with potassium chloride 40 mEq infusion (has no administration in time range)   dextrose 5 % and sodium chloride 0.45 % infusion (has no administration in time range)   dextrose 5 % and sodium chloride 0.45 % with KCl 20 mEq/L infusion (has no administration in time range)   dextrose 5 % and sodium chloride 0.45 % with KCl 40 mEq/L infusion (has no administration in time range)   insulin regular 1 unit/mL in 0.9% sodium chloride (Glucommander) (5.4 Units/hr Intravenous Currently Infusing 12/9/24 1951)   Potassium Replacement - Follow Nurse / BPA Driven Protocol (has no administration in time range)   Magnesium Standard Dose Replacement - Follow Nurse / BPA Driven Protocol (has no administration in time range)   Phosphorus Replacement - Follow Nurse / BPA Driven Protocol (has no administration in time range)   Calcium Replacement - Follow Nurse / BPA Driven Protocol (has no administration in time range)   cefepime 2000 mg IVPB in 100 mL NS (MBP) (0 mg Intravenous Stopped 12/9/24 1824)       Imaging results:  CT Head Without Contrast    Result Date: 12/9/2024  Impression: 1.No acute intracranial process identified. Please note that there is a degree of motion artifact which limits image quality. Electronically Signed: Moses Gibson MD  12/9/2024 6:08 PM EST  Workstation ID: [id number]    XR Chest 1 View    Result Date: 12/9/2024  Impression: No acute process identified Electronically Signed: Moses Gibson MD  12/9/2024 6:04 PM EST  Workstation ID: [id number]     Social issues:   Social History     Socioeconomic History    Marital status: Single   Tobacco Use    Smoking status: Never    Smokeless tobacco: Former     Types: Chew     Quit date: 2017   Vaping Use    Vaping status: Never Used    Substance and Sexual Activity    Alcohol use: Yes     Comment: occasionally    Drug use: No    Sexual activity: Defer       NIH Stroke Scale:  Interval: (not recorded)  1a. Level of Consciousness: (not recorded)  1b. LOC Questions: (not recorded)  1c. LOC Commands: (not recorded)  2. Best Gaze: (not recorded)  3. Visual: (not recorded)  4. Facial Palsy: (not recorded)  5a. Motor Arm, Left: (not recorded)  5b. Motor Arm, Right: (not recorded)  6a. Motor Leg, Left: (not recorded)  6b. Motor Leg, Right: (not recorded)  7. Limb Ataxia: (not recorded)  8. Sensory: (not recorded)  9. Best Language: (not recorded)  10. Dysarthria: (not recorded)  11. Extinction and Inattention (formerly Neglect): (not recorded)    Total (NIH Stroke Scale): (not recorded)     Additional notable assessment information:       Nursing report ED to floor:    ICU nurse, Alex Avery LPN   12/09/24 20:18 EST Nursing report ED to floor  Colten Yanez Jr.  59 y.o.  male    HPI:   Chief Complaint   Patient presents with    Loss of Consciousness       Admitting doctor:   Oleg Veronica MD    Admitting diagnosis:   The primary encounter diagnosis was Diabetic ketoacidosis with coma associated with type 1 diabetes mellitus. Diagnoses of Hypothermia, initial encounter, Leukocytosis, unspecified type, and Rhinovirus infection were also pertinent to this visit.    Code status:   Current Code Status       Date Active Code Status Order ID Comments User Context       Not on file            Allergies:   Patient has no known allergies.    Isolation:  Droplet     Fall Risk:  Fall Risk Assessment was completed, and patient is at high risk for falls.   Predictive Model Details         27 (Low) Factor Value    Calculated 12/9/2024 20:12 Age 59    Risk of Fall Model [place] Coma Scale 7     Active Peripheral IV Present     Imaging order in this encounter Present     Magnesium 3.6 mg/dL     Diastolic BP 62     Number of Distinct Medication Classes  administered 3     Creatinine 2.67 mg/dL     Mehdi Scale not on file     Calcium not on file     Clinically Relevant Sex Not Female     Albumin not on file     Potassium 6 mmol/L     Respiratory Rate 16     Total Bilirubin not on file     Days after Admission 0.13     Chloride not on file     ALT not on file         Weight:       12/09/24 1722   Weight: 86.5 kg (190 lb 11.2 oz)       Intake and Output  No intake or output data in the 24 hours ending 12/09/24 2018    Diet:   Dietary Orders (From admission, onward)       Start     Ordered    12/09/24 1711  Patient is on Glucommander  Continuous        Question Answer Comment   Tray Note: Glucommander    Patient is on Glucommander: Yes        12/09/24 1711 12/09/24 1711  NPO Diet NPO Type: Strict NPO  Diet Effective Now        Question:  NPO Type  Answer:  Strict NPO    12/09/24 1711                     Most recent vitals:   Vitals:    12/09/24 1947 12/09/24 2001 12/09/24 2002 12/09/24 2014   BP:  105/62     BP Location:  Left arm     Patient Position:  Lying     Pulse: 100 100 100 103   Resp:  16     Temp: (!) 91 °F (32.8 °C) (!) 91.8 °F (33.2 °C) (!) 91.8 °F (33.2 °C) 92.3 °F (33.5 °C)   TempSrc:       SpO2: 100% 98% 98%    Weight:       Height:           Active LDAs/IV Access:   Lines, Drains & Airways       Active LDAs       Name Placement date Placement time Site Days    Peripheral IV 12/09/24 1714 Right Antecubital 12/09/24 1714  Antecubital  less than 1    Peripheral IV 12/09/24 1716 Left Antecubital 12/09/24 1716  Antecubital  less than 1    Urethral Catheter Temperature probe 16 Fr. 12/09/24 1719  -- less than 1                    Skin Condition:   Skin Assessments (last day)       None             Labs (abnormal labs have a star):   Labs Reviewed   RESPIRATORY PANEL PCR W/ COVID-19 (SARS-COV-2), NP SWAB IN UTM/VTP, 2 HR TAT - Abnormal; Notable for the following components:       Result Value    Human Rhinovirus/Enterovirus Detected (*)     All other  components within normal limits    Narrative:     In the setting of a positive respiratory panel with a viral infection PLUS a negative procalcitonin without other underlying concern for bacterial infection, consider observing off antibiotics or discontinuation of antibiotics and continue supportive care. If the respiratory panel is positive for atypical bacterial infection (Bordetella pertussis, Chlamydophila pneumoniae, or Mycoplasma pneumoniae), consider antibiotic de-escalation to target atypical bacterial infection.   COMPREHENSIVE METABOLIC PANEL - Abnormal; Notable for the following components:    Glucose 1,172 (*)     BUN 52 (*)     Creatinine 3.25 (*)     Sodium 128 (*)     Potassium 5.6 (*)     Chloride 88 (*)     CO2 4.2 (*)     Alkaline Phosphatase 153 (*)     Anion Gap 35.8 (*)     eGFR 21.3 (*)     All other components within normal limits    Narrative:     GFR Normal >60  Chronic Kidney Disease <60  Kidney Failure <15     MAGNESIUM - Abnormal; Notable for the following components:    Magnesium 3.6 (*)     All other components within normal limits   OSMOLALITY, SERUM - Abnormal; Notable for the following components:    Osmolality 386 (*)     All other components within normal limits   HEMOGLOBIN A1C - Abnormal; Notable for the following components:    Hemoglobin A1C 13.10 (*)     All other components within normal limits   PHOSPHORUS - Abnormal; Notable for the following components:    Phosphorus 12.2 (*)     All other components within normal limits   CBC WITH AUTO DIFFERENTIAL - Abnormal; Notable for the following components:    WBC 33.52 (*)     .0 (*)     MCH 33.4 (*)     MCHC 30.1 (*)     All other components within normal limits   URINALYSIS W/ MICROSCOPIC IF INDICATED (NO CULTURE) - Abnormal; Notable for the following components:    Glucose, UA >=1000 mg/dL (3+) (*)     Ketones, UA 15 mg/dL (1+) (*)     Blood, UA Small (1+) (*)     Protein,  mg/dL (2+) (*)     All other components  within normal limits   BLOOD GAS, ARTERIAL - Abnormal; Notable for the following components:    pH, Arterial 6.810 (*)     pCO2, Arterial 17.4 (*)     pO2, Arterial 165.4 (*)     HCO3, Arterial 2.8 (*)     Base Excess, Arterial <-30.0 (*)     PO2/FIO2 788 (*)     All other components within normal limits   MANUAL DIFFERENTIAL - Abnormal; Notable for the following components:    Neutrophil % 80.0 (*)     Lymphocyte % 7.0 (*)     Neutrophils Absolute 27.49 (*)     Monocytes Absolute 3.02 (*)     Basophils Absolute 0.34 (*)     All other components within normal limits   POC LACTATE - Abnormal; Notable for the following components:    Lactate 3.0 (*)     All other components within normal limits   POCT GLUCOSE FINGERSTICK - Abnormal; Notable for the following components:    Glucose >600 (*)     All other components within normal limits   POCT CREATININE - Abnormal; Notable for the following components:    Creatinine 2.67 (*)     eGFR 27.0 (*)     All other components within normal limits   ELECTROLYTES + H&H - Abnormal; Notable for the following components:    Sodium 121 (*)     POC Potassium 6.0 (*)     Glucose >700 (*)     All other components within normal limits   POCT GLUCOSE FINGERSTICK - Abnormal; Notable for the following components:    Glucose >700 (*)     All other components within normal limits   POCT GLUCOSE FINGERSTICK - Abnormal; Notable for the following components:    Glucose >600 (*)     All other components within normal limits   BLOOD CULTURE   BLOOD CULTURE   PHOSPHORUS   TROPONIN    Narrative:     High Sensitive Troponin T Reference Range:  <14.0 ng/L- Negative Female for AMI  <22.0 ng/L- Negative Male for AMI  >=14 - Abnormal Female indicating possible myocardial injury.  >=22 - Abnormal Male indicating possible myocardial injury.   Clinicians would have to utilize clinical acumen, EKG, Troponin, and serial changes to determine if it is an Acute Myocardial Infarction or myocardial injury due to  an underlying chronic condition.        MAGNESIUM   SCAN SLIDE   URINALYSIS, MICROSCOPIC ONLY   LACTIC ACID, REFLEX   HIGH SENSITIVITIY TROPONIN T 2HR   BASIC METABOLIC PANEL   MAGNESIUM   PHOSPHORUS   CBC AND DIFFERENTIAL    Narrative:     The following orders were created for panel order CBC & Differential.  Procedure                               Abnormality         Status                     ---------                               -----------         ------                     CBC Auto Differential[[id number]]        Abnormal            Final result               Scan Slide[[id number]]                                       Final result                 Please view results for these tests on the individual orders.   EXTRA TUBES    Narrative:     The following orders were created for panel order Extra Tubes.  Procedure                               Abnormality         Status                     ---------                               -----------         ------                     Light Blue Top[[id number]]                                   Final result                 Please view results for these tests on the individual orders.   LIGHT BLUE TOP       LOC:  Patient Is lethargic and disoriented at this time, primary nurse aware.     Telemetry:  Critical Care    Cardiac Monitoring Ordered: yes    EKG:   ECG 12 Lead Electrolyte Imbalance   Preliminary Result   HEART RATE=79  bpm   RR Pacmslon=924  ms   NE Corklaeb=207  ms   P Horizontal Axis=221  deg   P Front Axis=64  deg   QRSD Vximnsnf=458  ms   QT Bollbbpv=229  ms   PJnE=817  ms   QRS Axis=56  deg   T Wave Axis=147  deg   - ABNORMAL ECG -   Sinus rhythm   Nonspecific T abnormalities, lateral leads   ST elevation, consider inferior injury   Date and Time of Study:2024-12-09 17:18:26          Medications Given in the ED:   Medications   sodium chloride 0.9 % flush 10 mL (has no administration in time range)   sodium chloride 0.9 % flush 10 mL (has no administration in  time range)   sodium chloride 0.9 % infusion 40 mL (has no administration in time range)   dextrose (GLUTOSE) oral gel 15 g (has no administration in time range)   dextrose (D50W) (25 g/50 mL) IV injection 10-50 mL (has no administration in time range)   Glucagon (GLUCAGEN) injection 1 mg (has no administration in time range)   sodium chloride 0.9 % bolus (0 mL/hr Intravenous Stopped 12/9/24 1953)   sodium chloride 0.9 % infusion (has no administration in time range)   sodium chloride 0.9 % with KCl 20 mEq/L infusion (has no administration in time range)   sodium chloride 0.9 % with KCl 40 mEq/L infusion (has no administration in time range)   dextrose 5 % and sodium chloride 0.9 % infusion (has no administration in time range)   dextrose 5 % and sodium chloride 0.9 % with KCl 20 mEq/L infusion (has no administration in time range)   dextrose 5 % and sodium chloride 0.9 % with KCl 40 mEq/L infusion (has no administration in time range)   sodium chloride 0.45 % infusion (has no administration in time range)   sodium chloride 0.45 % with KCl 20 mEq/L infusion (has no administration in time range)   sodium chloride 0.45 % 1,000 mL with potassium chloride 40 mEq infusion (has no administration in time range)   dextrose 5 % and sodium chloride 0.45 % infusion (has no administration in time range)   dextrose 5 % and sodium chloride 0.45 % with KCl 20 mEq/L infusion (has no administration in time range)   dextrose 5 % and sodium chloride 0.45 % with KCl 40 mEq/L infusion (has no administration in time range)   insulin regular 1 unit/mL in 0.9% sodium chloride (Glucommander) (5.4 Units/hr Intravenous Currently Infusing 12/9/24 1951)   Potassium Replacement - Follow Nurse / BPA Driven Protocol (has no administration in time range)   Magnesium Standard Dose Replacement - Follow Nurse / BPA Driven Protocol (has no administration in time range)   Phosphorus Replacement - Follow Nurse / BPA Driven Protocol (has no administration  in time range)   Calcium Replacement - Follow Nurse / BPA Driven Protocol (has no administration in time range)   cefepime 2000 mg IVPB in 100 mL NS (MBP) (0 mg Intravenous Stopped 12/9/24 1824)       Imaging results:  CT Head Without Contrast    Result Date: 12/9/2024  Impression: 1.No acute intracranial process identified. Please note that there is a degree of motion artifact which limits image quality. Electronically Signed: Moses Gibson MD  12/9/2024 6:08 PM EST  Workstation ID: [id number]    XR Chest 1 View    Result Date: 12/9/2024  Impression: No acute process identified Electronically Signed: Moses Gibson MD  12/9/2024 6:04 PM EST  Workstation ID: [id number]     Social issues:   Social History     Socioeconomic History    Marital status: Single   Tobacco Use    Smoking status: Never    Smokeless tobacco: Former     Types: Chew     Quit date: 2017   Vaping Use    Vaping status: Never Used   Substance and Sexual Activity    Alcohol use: Yes     Comment: occasionally    Drug use: No    Sexual activity: Defer       NIH Stroke Scale:  Interval: (not recorded)  1a. Level of Consciousness: (not recorded)  1b. LOC Questions: (not recorded)  1c. LOC Commands: (not recorded)  2. Best Gaze: (not recorded)  3. Visual: (not recorded)  4. Facial Palsy: (not recorded)  5a. Motor Arm, Left: (not recorded)  5b. Motor Arm, Right: (not recorded)  6a. Motor Leg, Left: (not recorded)  6b. Motor Leg, Right: (not recorded)  7. Limb Ataxia: (not recorded)  8. Sensory: (not recorded)  9. Best Language: (not recorded)  10. Dysarthria: (not recorded)  11. Extinction and Inattention (formerly Neglect): (not recorded)    Total (NIH Stroke Scale): (not recorded)     Additional notable assessment information:       Nursing report ED to floor:    ICU nurse, Alex Avery LPN   12/09/24 20:18 EST

## 2024-12-10 NOTE — PROGRESS NOTES
Critical Care Progress Note     Colten Yanez Jr. : 1965 MRN:[id number] LOS:1  Rm: 2313/1     Principal Problem: DKA (diabetic ketoacidosis)     Reason for follow up: All the medical problems listed below    Summary     Colten Yanez Jr. is a 59 y.o. male with PMH of Diabetes mellitus type 1.5, dyslipidemia, vitamin D deficiency, CAD with history of stent placement presented to the hospital after being found down at home, and was admitted with a principal diagnosis of DKA (diabetic ketoacidosis).  Per patient for HPI taken from chart review as patient is lethargic and very confused when he is awake.  Patient was found down by neighbors on his porch earlier this morning.  EMS was called and patient was unresponsive with a core temperature of 88.6.  Upon arrival to the emergency department he was placed on a Pedro hugger and a temperature sensing Dixon was placed.  Initially patient was unresponsive but his mental status improved with IV fluid resuscitation.  Lab work obtained was consistent with DKA and thus DKA protocol with insulin drip was initiated.  CT head was obtained and negative of any abnormality.  CXR was clear.  Lactic acid was elevated at 3.0 and patient had white count of 33.52.  Patient was given empiric antibiotics and blood cultures were obtained.  RVP was + rhinovirus.  Urinalysis was negative for UTI.  No clear bacterial source of infection could be identified.  Patient's mother at bedside states that he drinks probably 2 beers a day.  Patient awake but agitated trying to get up out of bed.  When asked if he drinks alcohol he says no.    Sepsis workup has revealed Klebsiella oxytoca bacteremia, antimicrobials were adjusted.       ACP: Patient does not have advanced directive on file at this facility.  Patient unable to participate in HPI due to confusion.  Patient's mother at bedside states he is a full code.    Significant Events / Subjective     12/10/24 : Anion gap 13.9, CO2 15.1; not  yet ready to transition, continues on Insulin gtt and IVF continue.  Mentation is still altered, requiring Precedex due to agitation.  Weaning Levophed.  Antimicrobials changed to ceftriaxone for a total of 7 days.  EKG reviewed, mild ST elevation, non-specific, no reciprocal changes, no complaint of chest pain, no rub auscultated.  Repeat troponin-42.  Renal function is slowly improving.  IVF changed to Bicarb gtt.  Due to decreased UOP, 1 dose of Lasix ordered, monitor I/Os.    Assessment / Plan     Diabetic ketoacidosis without coma, with history of diabetes mellitus, type 1.5, insulin dependent, very poorly controlled  Etiology unclear, likely secondary to acute illness.  Anion gap of 32.8 on admission.  A1c 13.10.  DKA protocol initiated with insulin gtt and IVF as ordered, per protocol  Accuchecks every hour and serial labs as ordered.  Continue protocol until resolved per protocol recommendations.    Septic Shock due to bacteremia with Klebsiella oxytoca / Rhinovirus infection   Likely due to bacteremia  Blood cultures: BCID with Klebsiella oxytoca  RVP: + Human rhinovirus/enterovirus  MRSA Screen-negative.  Will do blood / urine / sputum cultures.   Antimicrobials adjusted to ceftriaxone for planned 7-day course.  Persistent hypotension in spite of adequate fluid resuscitation.  Titrate vasopressors for a target MAP of 65. On Levophed gtt.    Acute encephalopathy, possible delirium  CT Head (12/9): no acute intracranial abnormalities.  Continue antimicrobials.  Further workup as clinically indicated.  Currently on Precedex gtt.    Acute Kidney Injury  Remains nonoliguric. Baseline creatinine 0.91.  Likely prerenal. Possible intrinsic component due to ATN from infection, drugs and hypotension.   C/w IV fluids as ordered.  Monitor Input/Output very closely.   Avoids NSAIDs, nephrotoxic medications, and hypotension.  Net IO Since Admission: 4,515 mL [12/10/24 1314]     Metabolic acidosis  Anion gap closed,  continued low Bicarb of 15.1--14.1.  Changing IVF to sodium bicarbonate gtt.  Continue to monitor and trend labs.    Coronary artery disease involving native coronary artery of native heart without angina pectoris  Chronic and stable.   HS Troponin 42 with reflex of 40 & 36.  EKG reviewed independently (12/10): Normal sinus rhythm, [place] depression with ST elevation in multiple leads, nonfocal, without reciprocal changes, heart rate 68, QTc 438.  Once able to take PO medications, resume aspirin and statin therapy.      History of alcohol use  Reportedly per family drinks 2 cans of beers daily, family denied, but was confused at baseline.  [facility] protocol monitoring without medications ordered.  Will order as needed.    Dyslipidemia: Continue statin therapy.    Disposition:  Due to continued vasopressors and sedation,     Code status:   Code Status (Patient has no pulse and is not breathing): CPR (Attempt to Resuscitate)  Medical Interventions (Patient has pulse or is breathing): Full Support       Nutrition:   NPO Diet NPO Type: Strict NPO   Patient isn't on Tube Feeding     VTE Prophylaxis:  Pharmacologic & mechanical VTE prophylaxis orders are present.       Objective / Physical Exam     Vital signs:  Temp: 98.2 °F (36.8 °C)  BP: (!) 75/55  Heart Rate: 71  Resp: 17  SpO2: 99 %  Weight: 86.5 kg (190 lb 11.2 oz)    Admission Weight: Weight: 86.5 kg (190 lb 11.2 oz)  Current Weight: Weight: 86.5 kg (190 lb 11.2 oz)    Input/Output in last 24 hours:    Intake/Output Summary (Last 24 hours) at 12/10/2024 1107  Last data filed at 12/10/2024 1053  Gross per 24 hour   Intake 4675 ml   Output 140 ml   Net 4535 ml      Net IO Since Admission: 4,535 mL [12/10/24 1107]     Physical Exam  Vitals and nursing note reviewed.   Constitutional:       General: He is not in acute distress.     Appearance: He is ill-appearing. He is not toxic-appearing or diaphoretic.   HENT:      Head: Normocephalic.      Nose: Nose normal. No congestion.       Mouth/Throat:      Mouth: Mucous membranes are moist.      Pharynx: Oropharynx is clear. No oropharyngeal exudate.   Eyes:      Conjunctiva/sclera: Conjunctivae normal.      Pupils: Pupils are equal, round, and reactive to light.   Cardiovascular:      Rate and Rhythm: Normal rate and regular rhythm.      Heart sounds: Normal heart sounds.      No friction rub.   Pulmonary:      Comments: Diminished bibasilar breath sounds without rhonchi, wheezes or rales  Abdominal:      General: There is no distension.      Tenderness: There is no abdominal tenderness.   Musculoskeletal:      Cervical back: Neck supple.      Right lower leg: No edema.      Left lower leg: No edema.   Skin:     General: Skin is warm and dry.      Findings: No rash.   Neurological:      Mental Status: He is alert.   Psychiatric:      Comments: Anxious/restless.         Radiology and Labs     Results from last 7 days   Lab Units 12/10/24  0518 12/09/24  1721 12/09/24  1714   WBC 10*3/mm3 19.58*  --  33.52*   HEMOGLOBIN g/dL 13.7  --  14.9   HEMOGLOBIN, POC g/dL  --  16.8  --    HEMATOCRIT % 38.0  --  49.5   HEMATOCRIT POC %  --  50  --    PLATELETS 10*3/mm3 276  --  399           Results from last 7 days   Lab Units 12/10/24  0804 12/10/24  0518 12/10/24  0314 12/09/24  2312 12/09/24  2148 12/09/24  1906 12/09/24  1906 12/09/24  1812   SODIUM mmol/L 141 142 141 136  --   --  128*  --    POTASSIUM mmol/L 4.4 4.4 4.4 4.6  --   --  5.6*  --    CHLORIDE mmol/L 112* 111* 108* 97*  --   --  88*  --    CO2 mmol/L 15.1* 15.6* 14.2* 6.2*  --   --  4.2*  --    BUN mg/dL 53* 53* 53* 54*  --   --  52*  --    CREATININE mg/dL 2.66* 2.93* 2.89* 3.24*  --   --  3.25*  --    GLUCOSE mg/dL 210* 220* 328* 705*  705* 863*   < > 1,172*  --    MAGNESIUM mg/dL 2.5  --   --  3.0*  --   --   --  3.6*   PHOSPHORUS mg/dL 2.3*  --   --  4.4  --   --   --  12.2*    < > = values in this interval not displayed.      Results from last 7 days   Lab Units 12/10/24  0510  12/09/24  1906   ALK PHOS U/L 114 153*   AST (SGOT) U/L 24 21   ALT (SGPT) U/L 16 19     Results from last 7 days   Lab Units 12/09/24  1721   PH, ARTERIAL pH units 6.810*   PCO2, ARTERIAL mm Hg 17.4*   PO2 ART mm Hg 165.4*   O2 SATURATION ART % 97.1   FIO2 % 21   HCO3 ART mmol/L 2.8*   BASE EXCESS ART mmol/L <-30.0*       CT Head Without Contrast    Result Date: 12/9/2024  Impression: 1.No acute intracranial process identified. Please note that there is a degree of motion artifact which limits image quality. Electronically Signed: Moses Gibson MD  12/9/2024 6:08 PM EST  Workstation ID: [id number]    XR Chest 1 View    Result Date: 12/9/2024  Impression: No acute process identified Electronically Signed: Moses Gibson MD  12/9/2024 6:04 PM EST  Workstation ID: [id number]     Current medications     Scheduled Meds:   cefTRIAXone, 2,000 mg, Intravenous, Q24H  folic acid 1 mg in sodium chloride 0.9 % 50 mL IVPB, 1 mg, Intravenous, Daily  heparin (porcine), 5,000 Units, Subcutaneous, Q8H  mupirocin, 1 Application, Each Nare, BID  senna-docusate sodium, 2 tablet, Oral, BID  sodium chloride, 10 mL, Intravenous, Q12H  sodium chloride, 10 mL, Intravenous, Q12H  thiamine (B-1) IV, 200 mg, Intravenous, Q8H   Followed by  [START ON 12/15/2024] thiamine, 100 mg, Oral, Daily        Continuous Infusions:   dexmedetomidine, 0.2-1.5 mcg/kg/hr, Last Rate: 0.8 mcg/kg/hr (12/10/24 0642)  dextrose 5 % and sodium chloride 0.45 %, 125 mL/hr  dextrose 5 % and sodium chloride 0.45 % with KCl 20 mEq/L, 125 mL/hr, Last Rate: 125 mL/hr (12/10/24 1011)  dextrose 5 % and sodium chloride 0.45 % with KCl 40 mEq/L, 125 mL/hr  dextrose 5 % and sodium chloride 0.9 %, 125 mL/hr  dextrose 5 % and sodium chloride 0.9 % with KCl 20 mEq, 125 mL/hr  dextrose 5% and sodium chloride 0.9% with KCl 40 mEq/L, 125 mL/hr  insulin, 0-100 Units/hr, Last Rate: 3.7 Units/hr (12/10/24 1011)  norepinephrine, 0.02-0.3 mcg/kg/min, Last Rate: Stopped (12/10/24 0707)  sodium  chloride 0.45 % 1,000 mL with potassium chloride 40 mEq infusion, 200 mL/hr  sodium chloride, 200 mL/hr  sodium chloride 0.45 % with KCl 20 mEq, 200 mL/hr, Last Rate: 200 mL/hr (12/10/24 0303)  sodium chloride, 200 mL/hr, Last Rate: 200 mL/hr (12/09/24 2054)  sodium chloride 0.9 % with KCl 20 mEq, 200 mL/hr  sodium chloride 0.9 % with KCl 40 mEq/L, 200 mL/hr      Patient continues to be critically ill, remains at risk of clinical deterioration or death and needed high complexity decision making. I have spent a total of 32 minutes providing critical care services to this patient including but not limited to: review of labs/ microbiology/imaging/medications, serial monitoring of vital signs, review of other consultant's notes, review of events in the last 24 hrs, monitoring input/output, review of treatment plan with bedside nurse, RT and other treatment team, management of life support and nutrition needs. I also spoke with patient's family about the plan of care and answered all questions.    Time spent in performing separately billable procedures and updating family is not included in the critical care time.       EFREN Murray   Critical Care  12/10/24   11:07 EST

## 2024-12-11 ENCOUNTER — APPOINTMENT (OUTPATIENT)
Dept: GENERAL RADIOLOGY | Facility: HOSPITAL | Age: 59
DRG: 638 | End: 2024-12-11

## 2024-12-11 LAB
ACETONE BLD QL: ABNORMAL
ALBUMIN SERPL-MCNC: 3.7 G/DL (ref 3.5–5.2)
ALBUMIN/GLOB SERPL: 1.4 G/DL
ALP SERPL-CCNC: 108 U/L (ref 39–117)
ALT SERPL W P-5'-P-CCNC: 20 U/L (ref 1–41)
ANION GAP SERPL CALCULATED.3IONS-SCNC: 11.3 MMOL/L (ref 5–15)
ANION GAP SERPL CALCULATED.3IONS-SCNC: 17.6 MMOL/L (ref 5–15)
AST SERPL-CCNC: 39 U/L (ref 1–40)
B-OH-BUTYR SERPL-SCNC: 4.76 MMOL/L (ref 0.02–0.27)
BASOPHILS # BLD AUTO: 0.03 10*3/MM3 (ref 0–0.2)
BASOPHILS NFR BLD AUTO: 0.2 % (ref 0–1.5)
BILIRUB SERPL-MCNC: 0.2 MG/DL (ref 0–1.2)
BUN SERPL-MCNC: 24 MG/DL (ref 6–20)
BUN SERPL-MCNC: 42 MG/DL (ref 6–20)
BUN/CREAT SERPL: 22.7 (ref 7–25)
BUN/CREAT SERPL: 24.2 (ref 7–25)
CALCIUM SPEC-SCNC: 8.7 MG/DL (ref 8.6–10.5)
CALCIUM SPEC-SCNC: 9.2 MG/DL (ref 8.6–10.5)
CHLORIDE SERPL-SCNC: 110 MMOL/L (ref 98–107)
CHLORIDE SERPL-SCNC: 112 MMOL/L (ref 98–107)
CO2 SERPL-SCNC: 16.4 MMOL/L (ref 22–29)
CO2 SERPL-SCNC: 25.7 MMOL/L (ref 22–29)
CREAT SERPL-MCNC: 0.99 MG/DL (ref 0.76–1.27)
CREAT SERPL-MCNC: 1.85 MG/DL (ref 0.76–1.27)
D-LACTATE SERPL-SCNC: 1.4 MMOL/L (ref 0.5–2)
DEPRECATED RDW RBC AUTO: 44.2 FL (ref 37–54)
EGFRCR SERPLBLD CKD-EPI 2021: 42 ML/MIN/1.73
EGFRCR SERPLBLD CKD-EPI 2021: 88.9 ML/MIN/1.73
EOSINOPHIL # BLD AUTO: 0 10*3/MM3 (ref 0–0.4)
EOSINOPHIL NFR BLD AUTO: 0 % (ref 0.3–6.2)
ERYTHROCYTE [DISTWIDTH] IN BLOOD BY AUTOMATED COUNT: 13.5 % (ref 12.3–15.4)
GLOBULIN UR ELPH-MCNC: 2.7 GM/DL
GLUCOSE BLDC GLUCOMTR-MCNC: 125 MG/DL (ref 70–105)
GLUCOSE BLDC GLUCOMTR-MCNC: 200 MG/DL (ref 70–105)
GLUCOSE BLDC GLUCOMTR-MCNC: 203 MG/DL (ref 70–105)
GLUCOSE BLDC GLUCOMTR-MCNC: 300 MG/DL (ref 70–105)
GLUCOSE BLDC GLUCOMTR-MCNC: 80 MG/DL (ref 70–105)
GLUCOSE BLDC GLUCOMTR-MCNC: 82 MG/DL (ref 70–105)
GLUCOSE SERPL-MCNC: 216 MG/DL (ref 65–99)
GLUCOSE SERPL-MCNC: 86 MG/DL (ref 65–99)
HCT VFR BLD AUTO: 35.4 % (ref 37.5–51)
HGB BLD-MCNC: 12.9 G/DL (ref 13–17.7)
IMM GRANULOCYTES # BLD AUTO: 0.1 10*3/MM3 (ref 0–0.05)
IMM GRANULOCYTES NFR BLD AUTO: 0.7 % (ref 0–0.5)
LYMPHOCYTES # BLD AUTO: 1.46 10*3/MM3 (ref 0.7–3.1)
LYMPHOCYTES NFR BLD AUTO: 9.5 % (ref 19.6–45.3)
MAGNESIUM SERPL-MCNC: 2.1 MG/DL (ref 1.6–2.6)
MAGNESIUM SERPL-MCNC: 2.2 MG/DL (ref 1.6–2.6)
MCH RBC QN AUTO: 33.3 PG (ref 26.6–33)
MCHC RBC AUTO-ENTMCNC: 36.4 G/DL (ref 31.5–35.7)
MCV RBC AUTO: 91.5 FL (ref 79–97)
MONOCYTES # BLD AUTO: 1.16 10*3/MM3 (ref 0.1–0.9)
MONOCYTES NFR BLD AUTO: 7.6 % (ref 5–12)
NEUTROPHILS NFR BLD AUTO: 12.6 10*3/MM3 (ref 1.7–7)
NEUTROPHILS NFR BLD AUTO: 82 % (ref 42.7–76)
NRBC BLD AUTO-RTO: 0 /100 WBC (ref 0–0.2)
PHOSPHATE SERPL-MCNC: 2.3 MG/DL (ref 2.5–4.5)
PLATELET # BLD AUTO: 205 10*3/MM3 (ref 140–450)
PMV BLD AUTO: 10.5 FL (ref 6–12)
POTASSIUM SERPL-SCNC: 2.8 MMOL/L (ref 3.5–5.2)
POTASSIUM SERPL-SCNC: 4 MMOL/L (ref 3.5–5.2)
PROT SERPL-MCNC: 6.4 G/DL (ref 6–8.5)
RBC # BLD AUTO: 3.87 10*6/MM3 (ref 4.14–5.8)
SODIUM SERPL-SCNC: 144 MMOL/L (ref 136–145)
SODIUM SERPL-SCNC: 149 MMOL/L (ref 136–145)
WBC NRBC COR # BLD AUTO: 15.35 10*3/MM3 (ref 3.4–10.8)

## 2024-12-11 PROCEDURE — 25010000002 PHENOBARBITAL PER 120 MG: Performed by: NURSE PRACTITIONER

## 2024-12-11 PROCEDURE — 85025 COMPLETE CBC W/AUTO DIFF WBC: CPT | Performed by: NURSE PRACTITIONER

## 2024-12-11 PROCEDURE — 25010000002 CEFTRIAXONE PER 250 MG: Performed by: EMERGENCY MEDICINE

## 2024-12-11 PROCEDURE — 83605 ASSAY OF LACTIC ACID: CPT | Performed by: EMERGENCY MEDICINE

## 2024-12-11 PROCEDURE — 25010000002 LORAZEPAM PER 2 MG: Performed by: NURSE PRACTITIONER

## 2024-12-11 PROCEDURE — 25010000002 THIAMINE PER 100 MG: Performed by: NURSE PRACTITIONER

## 2024-12-11 PROCEDURE — 25010000002 THIAMINE HCL 200 MG/2ML SOLUTION: Performed by: NURSE PRACTITIONER

## 2024-12-11 PROCEDURE — 83735 ASSAY OF MAGNESIUM: CPT | Performed by: NURSE PRACTITIONER

## 2024-12-11 PROCEDURE — 63710000001 INSULIN LISPRO (HUMAN) PER 5 UNITS: Performed by: NURSE PRACTITIONER

## 2024-12-11 PROCEDURE — 82948 REAGENT STRIP/BLOOD GLUCOSE: CPT

## 2024-12-11 PROCEDURE — 63710000001 ONDANSETRON ODT 4 MG TABLET DISPERSIBLE: Performed by: NURSE PRACTITIONER

## 2024-12-11 PROCEDURE — 82009 KETONE BODYS QUAL: CPT | Performed by: EMERGENCY MEDICINE

## 2024-12-11 PROCEDURE — 74018 RADEX ABDOMEN 1 VIEW: CPT

## 2024-12-11 PROCEDURE — 25010000002 HEPARIN (PORCINE) PER 1000 UNITS: Performed by: EMERGENCY MEDICINE

## 2024-12-11 PROCEDURE — 82948 REAGENT STRIP/BLOOD GLUCOSE: CPT | Performed by: NURSE PRACTITIONER

## 2024-12-11 PROCEDURE — 84100 ASSAY OF PHOSPHORUS: CPT | Performed by: NURSE PRACTITIONER

## 2024-12-11 PROCEDURE — 80053 COMPREHEN METABOLIC PANEL: CPT | Performed by: NURSE PRACTITIONER

## 2024-12-11 PROCEDURE — 63710000001 INSULIN GLARGINE PER 5 UNITS: Performed by: NURSE PRACTITIONER

## 2024-12-11 PROCEDURE — 82010 KETONE BODYS QUAN: CPT | Performed by: EMERGENCY MEDICINE

## 2024-12-11 RX ORDER — LORAZEPAM 2 MG/ML
2 INJECTION INTRAMUSCULAR
Status: DISCONTINUED | OUTPATIENT
Start: 2024-12-11 | End: 2024-12-14

## 2024-12-11 RX ORDER — INSULIN LISPRO 100 [IU]/ML
3-14 INJECTION, SOLUTION INTRAVENOUS; SUBCUTANEOUS EVERY 6 HOURS SCHEDULED
Status: DISCONTINUED | OUTPATIENT
Start: 2024-12-11 | End: 2024-12-14

## 2024-12-11 RX ORDER — AMOXICILLIN 250 MG
2 CAPSULE ORAL 2 TIMES DAILY
Status: DISCONTINUED | OUTPATIENT
Start: 2024-12-11 | End: 2024-12-12

## 2024-12-11 RX ORDER — LORAZEPAM 2 MG/ML
1 INJECTION INTRAMUSCULAR ONCE
Status: COMPLETED | OUTPATIENT
Start: 2024-12-11 | End: 2024-12-11

## 2024-12-11 RX ORDER — FOLIC ACID 1 MG/1
1 TABLET ORAL DAILY
Status: DISCONTINUED | OUTPATIENT
Start: 2024-12-12 | End: 2024-12-16 | Stop reason: HOSPADM

## 2024-12-11 RX ORDER — CARVEDILOL 3.12 MG/1
3.12 TABLET ORAL 2 TIMES DAILY WITH MEALS
Status: DISCONTINUED | OUTPATIENT
Start: 2024-12-11 | End: 2024-12-16 | Stop reason: HOSPADM

## 2024-12-11 RX ORDER — SODIUM BICARBONATE 650 MG/1
650 TABLET ORAL 3 TIMES DAILY
Status: DISCONTINUED | OUTPATIENT
Start: 2024-12-11 | End: 2024-12-12

## 2024-12-11 RX ORDER — ASPIRIN 81 MG/1
81 TABLET, CHEWABLE ORAL DAILY
Status: DISCONTINUED | OUTPATIENT
Start: 2024-12-11 | End: 2024-12-16 | Stop reason: HOSPADM

## 2024-12-11 RX ORDER — LORAZEPAM 2 MG/ML
2 INJECTION INTRAMUSCULAR
Status: DISCONTINUED | OUTPATIENT
Start: 2024-12-11 | End: 2024-12-15

## 2024-12-11 RX ORDER — THIAMINE HYDROCHLORIDE 100 MG/ML
200 INJECTION, SOLUTION INTRAMUSCULAR; INTRAVENOUS EVERY 8 HOURS SCHEDULED
Status: COMPLETED | OUTPATIENT
Start: 2024-12-11 | End: 2024-12-14

## 2024-12-11 RX ORDER — PHENOBARBITAL SODIUM 65 MG/ML
65 INJECTION, SOLUTION INTRAMUSCULAR; INTRAVENOUS ONCE
Status: COMPLETED | OUTPATIENT
Start: 2024-12-12 | End: 2024-12-12

## 2024-12-11 RX ORDER — LABETALOL HYDROCHLORIDE 5 MG/ML
10 INJECTION, SOLUTION INTRAVENOUS EVERY 6 HOURS PRN
Status: DISCONTINUED | OUTPATIENT
Start: 2024-12-11 | End: 2024-12-16 | Stop reason: HOSPADM

## 2024-12-11 RX ORDER — LORAZEPAM 1 MG/1
1 TABLET ORAL
Status: DISCONTINUED | OUTPATIENT
Start: 2024-12-11 | End: 2024-12-15

## 2024-12-11 RX ORDER — ATORVASTATIN CALCIUM 10 MG/1
10 TABLET, FILM COATED ORAL DAILY
Status: DISCONTINUED | OUTPATIENT
Start: 2024-12-12 | End: 2024-12-16 | Stop reason: HOSPADM

## 2024-12-11 RX ORDER — POTASSIUM CHLORIDE 1.5 G/1.58G
40 POWDER, FOR SOLUTION ORAL EVERY 4 HOURS
Status: COMPLETED | OUTPATIENT
Start: 2024-12-11 | End: 2024-12-11

## 2024-12-11 RX ORDER — PHENOBARBITAL 32.4 MG/1
32.4 TABLET ORAL ONCE
Status: DISCONTINUED | OUTPATIENT
Start: 2024-12-13 | End: 2024-12-12

## 2024-12-11 RX ORDER — BISACODYL 5 MG/1
5 TABLET, DELAYED RELEASE ORAL DAILY PRN
Status: DISCONTINUED | OUTPATIENT
Start: 2024-12-11 | End: 2024-12-12

## 2024-12-11 RX ORDER — LORAZEPAM 2 MG/ML
1 INJECTION INTRAMUSCULAR
Status: DISCONTINUED | OUTPATIENT
Start: 2024-12-11 | End: 2024-12-14

## 2024-12-11 RX ORDER — BISACODYL 10 MG
10 SUPPOSITORY, RECTAL RECTAL DAILY PRN
Status: DISCONTINUED | OUTPATIENT
Start: 2024-12-11 | End: 2024-12-12

## 2024-12-11 RX ORDER — LORAZEPAM 1 MG/1
2 TABLET ORAL
Status: DISCONTINUED | OUTPATIENT
Start: 2024-12-11 | End: 2024-12-14

## 2024-12-11 RX ORDER — POLYETHYLENE GLYCOL 3350 17 G/17G
17 POWDER, FOR SOLUTION ORAL DAILY PRN
Status: DISCONTINUED | OUTPATIENT
Start: 2024-12-11 | End: 2024-12-12

## 2024-12-11 RX ORDER — PHENOBARBITAL 32.4 MG/1
32.4 TABLET ORAL ONCE
Status: DISCONTINUED | OUTPATIENT
Start: 2024-12-14 | End: 2024-12-12

## 2024-12-11 RX ADMIN — CEFTRIAXONE 2000 MG: 2 INJECTION, POWDER, FOR SOLUTION INTRAMUSCULAR; INTRAVENOUS at 12:16

## 2024-12-11 RX ADMIN — THIAMINE HYDROCHLORIDE 200 MG: 100 INJECTION, SOLUTION INTRAMUSCULAR; INTRAVENOUS at 16:01

## 2024-12-11 RX ADMIN — DEXMEDETOMIDINE HYDROCHLORIDE 0.3 MCG/KG/HR: 4 INJECTION, SOLUTION INTRAVENOUS at 05:23

## 2024-12-11 RX ADMIN — ACETAMINOPHEN 650 MG: 325 TABLET, FILM COATED ORAL at 00:45

## 2024-12-11 RX ADMIN — INSULIN GLARGINE 30 UNITS: 100 INJECTION, SOLUTION SUBCUTANEOUS at 08:36

## 2024-12-11 RX ADMIN — PHENOBARBITAL SODIUM 227.5 MG: 130 INJECTION, SOLUTION INTRAMUSCULAR; INTRAVENOUS at 13:14

## 2024-12-11 RX ADMIN — LORAZEPAM 1 MG: 2 INJECTION INTRAMUSCULAR; INTRAVENOUS at 05:50

## 2024-12-11 RX ADMIN — HEPARIN SODIUM 5000 UNITS: 5000 INJECTION INTRAVENOUS; SUBCUTANEOUS at 21:25

## 2024-12-11 RX ADMIN — CARVEDILOL 3.12 MG: 3.12 TABLET, FILM COATED ORAL at 12:16

## 2024-12-11 RX ADMIN — MUPIROCIN 1 APPLICATION: 20 OINTMENT TOPICAL at 20:20

## 2024-12-11 RX ADMIN — MUPIROCIN 1 APPLICATION: 20 OINTMENT TOPICAL at 08:39

## 2024-12-11 RX ADMIN — PHENOBARBITAL SODIUM 292.5 MG: 130 INJECTION, SOLUTION INTRAMUSCULAR; INTRAVENOUS at 09:26

## 2024-12-11 RX ADMIN — NOREPINEPHRINE BITARTRATE 0.02 MCG/KG/MIN: 0.03 INJECTION, SOLUTION INTRAVENOUS at 06:34

## 2024-12-11 RX ADMIN — POTASSIUM CHLORIDE 40 MEQ: 1.5 POWDER, FOR SOLUTION ORAL at 20:20

## 2024-12-11 RX ADMIN — LORAZEPAM 1 MG: 2 INJECTION INTRAMUSCULAR; INTRAVENOUS at 01:38

## 2024-12-11 RX ADMIN — HEPARIN SODIUM 5000 UNITS: 5000 INJECTION INTRAVENOUS; SUBCUTANEOUS at 05:14

## 2024-12-11 RX ADMIN — PHENOBARBITAL SODIUM 227.5 MG: 130 INJECTION, SOLUTION INTRAMUSCULAR; INTRAVENOUS at 16:40

## 2024-12-11 RX ADMIN — SODIUM BICARBONATE 650 MG: 650 TABLET ORAL at 20:20

## 2024-12-11 RX ADMIN — Medication 10 ML: at 08:39

## 2024-12-11 RX ADMIN — LORAZEPAM 2 MG: 2 INJECTION INTRAMUSCULAR; INTRAVENOUS at 11:33

## 2024-12-11 RX ADMIN — INSULIN LISPRO 4 UNITS: 100 INJECTION, SOLUTION INTRAVENOUS; SUBCUTANEOUS at 05:14

## 2024-12-11 RX ADMIN — INSULIN LISPRO 7 UNITS: 100 INJECTION, SOLUTION INTRAVENOUS; SUBCUTANEOUS at 00:13

## 2024-12-11 RX ADMIN — HEPARIN SODIUM 5000 UNITS: 5000 INJECTION INTRAVENOUS; SUBCUTANEOUS at 16:01

## 2024-12-11 RX ADMIN — THIAMINE HYDROCHLORIDE 200 MG: 100 INJECTION, SOLUTION INTRAMUSCULAR; INTRAVENOUS at 05:14

## 2024-12-11 RX ADMIN — SENNOSIDES AND DOCUSATE SODIUM 2 TABLET: 50; 8.6 TABLET ORAL at 20:20

## 2024-12-11 RX ADMIN — THIAMINE HYDROCHLORIDE 200 MG: 100 INJECTION, SOLUTION INTRAMUSCULAR; INTRAVENOUS at 21:25

## 2024-12-11 RX ADMIN — SODIUM BICARBONATE 100 MEQ: 84 INJECTION INTRAVENOUS at 05:14

## 2024-12-11 RX ADMIN — LORAZEPAM 1 MG: 2 INJECTION INTRAMUSCULAR; INTRAVENOUS at 09:41

## 2024-12-11 RX ADMIN — FOLIC ACID 1 MG: 5 INJECTION, SOLUTION INTRAMUSCULAR; INTRAVENOUS; SUBCUTANEOUS at 08:37

## 2024-12-11 RX ADMIN — INSULIN LISPRO 4 UNITS: 100 INJECTION, SOLUTION INTRAVENOUS; SUBCUTANEOUS at 12:16

## 2024-12-11 RX ADMIN — ASPIRIN 81 MG CHEWABLE TABLET 81 MG: 81 TABLET CHEWABLE at 16:01

## 2024-12-11 RX ADMIN — ONDANSETRON 4 MG: 4 TABLET, ORALLY DISINTEGRATING ORAL at 01:38

## 2024-12-11 RX ADMIN — LORAZEPAM 2 MG: 2 INJECTION INTRAMUSCULAR; INTRAVENOUS at 18:59

## 2024-12-11 RX ADMIN — SODIUM BICARBONATE 650 MG: 650 TABLET ORAL at 16:01

## 2024-12-11 RX ADMIN — POTASSIUM CHLORIDE 40 MEQ: 1.5 POWDER, FOR SOLUTION ORAL at 23:57

## 2024-12-11 RX ADMIN — Medication 10 ML: at 20:29

## 2024-12-11 NOTE — CONSULTS
Diabetes Education    Patient Name:  Colten Yanez Jr.  YOB: 1965  MRN: [id number]  Admit Date:  12/9/2024        Follow up for diabetes education.  Per nurse patient is not appropriate for education at this time.        Electronically signed by:  Beatrice Diaz RN  12/11/24 11:49 EST

## 2024-12-11 NOTE — PROGRESS NOTES
Critical Care Progress Note     Colten Yanez Jr. : 1965 MRN:[id number] LOS:2  Rm: 2313/1     Principal Problem: DKA (diabetic ketoacidosis)     Reason for follow up: All the medical problems listed below    Summary     Colten Yanez Jr. is a 59 y.o. male with PMH of Diabetes mellitus type 1.5, dyslipidemia, vitamin D deficiency, CAD with history of stent placement presented to the hospital after being found down at home, and was admitted with a principal diagnosis of DKA (diabetic ketoacidosis).  Per patient for HPI taken from chart review as patient is lethargic and very confused when he is awake.  Patient was found down by neighbors on his porch earlier this morning.  EMS was called and patient was unresponsive with a core temperature of 88.6.  Upon arrival to the emergency department he was placed on a Pedro hugger and a temperature sensing Dixon was placed.  Initially patient was unresponsive but his mental status improved with IV fluid resuscitation.  Lab work obtained was consistent with DKA and thus DKA protocol with insulin drip was initiated.  CT head was obtained and negative of any abnormality.  CXR was clear.  Lactic acid was elevated at 3.0 and patient had white count of 33.52.  Patient was given empiric antibiotics and blood cultures were obtained.  RVP was + rhinovirus.  Urinalysis was negative for UTI.  No clear bacterial source of infection could be identified.  Patient's mother at bedside states that he drinks probably 2 beers a day.  Patient awake but agitated trying to get up out of bed.  When asked if he drinks alcohol he says no.    Sepsis workup has revealed Klebsiella oxytoca bacteremia, antimicrobials were adjusted.       ACP: Patient does not have advanced directive on file at this facility.  Patient unable to participate in HPI due to confusion.  Patient's mother at bedside states he is a full code.    Significant Events / Subjective     24 : Still in slight DKA with small  acetone and BHB of 4.76, but will continue with current plan of IV hydration with sodium HCO3.  Insulin was uptitrated to moderately high tier.  Creatinine continues to improve.  NG tube placed with initiation of tube feedings.  Will consider increasing Lantus dose in a.m.  Continues to have increased confusion and agitation, which is felt to be secondary to delirium or possible delirium tremens, phenobarbital initiated.  Due to patient's mental status, will keep in ICU for closer monitoring.  Hemodynamically stable.  Now hypertensive, Coreg initiated.    Assessment / Plan     Diabetic ketoacidosis without coma, with history of diabetes mellitus, type 1.5, insulin dependent, very poorly controlled  Etiology unclear, likely secondary to acute illness.  Anion gap of 32.8 on admission.  A1c 13.10.  In review of daily labs, patient's acetone was still small with BHB of 4.76.  Transitioned off of insulin gtt on 12/10 to Lantus 30 units daily.  Accuchecks with SSI, increased SSI to moderately high tier today.  Diabetes educator consultation.    Septic Shock due to bacteremia with Klebsiella oxytoca / Rhinovirus infection   Likely due to bacteremia  Blood cultures: BCID with Klebsiella oxytoca  RVP: + Human rhinovirus/enterovirus  MRSA Screen-negative.  Will do blood / urine / sputum cultures.   Antimicrobials adjusted to ceftriaxone for planned 7-day course.  Persistent hypotension in spite of adequate fluid resuscitation.  Titrate vasopressors for a target MAP of 65. Vasopressors have been weaned off.    Acute encephalopathy, suspected delirium tremens  History of alcohol use  Reportedly per family drinks 2 cans of beers daily, family denied, but was confused at baseline.  [facility] protocol monitoring without medications ordered.  Will order as needed.  Phenobarb taper ordered.  Continue thiamine and folic acid.  CT Head (12/9): no acute intracranial abnormalities.  Continue antimicrobials.  Further workup as clinically  indicated.  Precedex has been weaned off.    Acute Kidney Injury  Remains nonoliguric. Baseline creatinine 0.91.  Likely prerenal. Possible intrinsic component due to ATN from infection, drugs and hypotension.   C/w IV fluids as ordered.  Monitor Input/Output very closely.   Avoids NSAIDs, nephrotoxic medications, and hypotension.  Net IO Since Admission: 5,521 mL [12/11/24 1330]     Metabolic acidosis  Anion gap closed, continued low Bicarb of 15.1--14.1.  Changing IVF to sodium bicarbonate gtt. Sodium bicarbonate tablets added.  Continue to monitor and trend labs.    Coronary artery disease involving native coronary artery of native heart without angina pectoris  Chronic and stable.   HS Troponin 42 with reflex of 40 & 36.  EKG reviewed independently (12/10): Normal sinus rhythm, [place] depression with ST elevation in multiple leads, nonfocal, without reciprocal changes, heart rate 68, QTc 438.  Resume aspirin and statin therapy.      Essential hypertension: not well controlled.   Coreg initiated.  Titrate medications as needed.    Dyslipidemia: Continue statin therapy.    Disposition: Keeping in ICU for closer amount of patient's neurostatus.    Code status:   Code Status (Patient has no pulse and is not breathing): CPR (Attempt to Resuscitate)  Medical Interventions (Patient has pulse or is breathing): Full Support       Nutrition:   NPO Diet NPO Type: Strict NPO   Tube Feeding: Formula: Diabetisource AC (Glucerna 1.2); Feeding Type: Continuous; Start at: 30 mL/hr; Then Advance By: Do Not Advance; Water Flush: 20 mL; Every: 1 hour; Water Bolus: None     VTE Prophylaxis:  Pharmacologic & mechanical VTE prophylaxis orders are present.       Objective / Physical Exam     Vital signs:  Temp: 98 °F (36.7 °C)  BP: (!) 178/103  Heart Rate: 106  Resp: 20  SpO2: 98 %  Weight: 81.9 kg (180 lb 8.9 oz)    Admission Weight: Weight: 86.5 kg (190 lb 11.2 oz)  Current Weight: Weight: 81.9 kg (180 lb 8.9 oz)    Input/Output in last  24 hours:    Intake/Output Summary (Last 24 hours) at 12/11/2024 1330  Last data filed at 12/11/2024 1100  Gross per 24 hour   Intake 2601 ml   Output 1570 ml   Net 1031 ml      Net IO Since Admission: 5,521 mL [12/11/24 1330]     Physical Exam  Vitals and nursing note reviewed.   Constitutional:       General: He is not in acute distress.     Appearance: He is ill-appearing. He is not toxic-appearing or diaphoretic.   HENT:      Head: Normocephalic.      Nose: Nose normal. No congestion.      Mouth/Throat:      Mouth: Mucous membranes are moist.      Pharynx: Oropharynx is clear. No oropharyngeal exudate.   Eyes:      Conjunctiva/sclera: Conjunctivae normal.      Pupils: Pupils are equal, round, and reactive to light.   Cardiovascular:      Rate and Rhythm: Regular rhythm. Tachycardia present.      Pulses: Normal pulses.      Heart sounds: Normal heart sounds.      No friction rub.   Pulmonary:      Effort: Pulmonary effort is normal. No respiratory distress.      Comments: Diminished bibasilar breath sounds without rhonchi, wheezes or rales  Abdominal:      General: There is no distension.      Palpations: Abdomen is soft.      Tenderness: There is no abdominal tenderness.   Musculoskeletal:      Cervical back: Neck supple.      Right lower leg: No edema.      Left lower leg: No edema.   Skin:     General: Skin is warm and dry.      Findings: No rash.   Neurological:      General: No focal deficit present.      Mental Status: He is alert. He is disoriented.      Comments: Confused, moving all extremities.   Psychiatric:      Comments: Anxious/restless.         Radiology and Labs     Results from last 7 days   Lab Units 12/11/24  0509 12/10/24  0518 12/09/24  1721 12/09/24  1714   WBC 10*3/mm3 15.35* 19.58*  --  33.52*   HEMOGLOBIN g/dL 12.9* 13.7  --  14.9   HEMOGLOBIN, POC g/dL  --   --  16.8  --    HEMATOCRIT % 35.4* 38.0  --  49.5   HEMATOCRIT POC %  --   --  50  --    PLATELETS 10*3/mm3 205 276  --  399            Results from last 7 days   Lab Units 12/11/24  0509 12/10/24  1615 12/10/24  1207 12/10/24  0804 12/10/24  0518 12/10/24  0314 12/09/24  2312   SODIUM mmol/L 144 144 142 141 142   < > 136   POTASSIUM mmol/L 4.0 4.2 4.3 4.4 4.4   < > 4.6   CHLORIDE mmol/L 110* 115* 115* 112* 111*   < > 97*   CO2 mmol/L 16.4* 18.8* 14.1* 15.1* 15.6*   < > 6.2*   BUN mg/dL 42* 52* 53* 53* 53*   < > 54*   CREATININE mg/dL 1.85* 2.67* 2.54* 2.66* 2.93*   < > 3.24*   GLUCOSE mg/dL 216* 87 143* 210* 220*   < > 705*  705*   MAGNESIUM mg/dL 2.2 2.4 2.4 2.5  --   --  3.0*   PHOSPHORUS mg/dL 2.3* 1.8* 1.5* 2.3*  --   --  4.4    < > = values in this interval not displayed.      Results from last 7 days   Lab Units 12/11/24  0509 12/10/24  0518 12/09/24  1906   ALK PHOS U/L 108 114 153*   AST (SGOT) U/L 39 24 21   ALT (SGPT) U/L 20 16 19     Results from last 7 days   Lab Units 12/10/24  1447 12/09/24  1721   PH, ARTERIAL pH units  --  6.810*   PCO2, ARTERIAL mm Hg  --  17.4*   PO2 ART mm Hg  --  165.4*   O2 SATURATION ART %  --  97.1   FIO2 % 21 21   HCO3 ART mmol/L  --  2.8*   BASE EXCESS ART mmol/L  --  <-30.0*       XR Abdomen KUB    Result Date: 12/11/2024  Impression: Radiopaque tip of the enteric tube extends to the distal gastric body level Electronically Signed: Magnolia Cole MD  12/11/2024 11:15 AM EST  Workstation ID: [id number]    CT Head Without Contrast    Result Date: 12/9/2024  Impression: 1.No acute intracranial process identified. Please note that there is a degree of motion artifact which limits image quality. Electronically Signed: Moses Gibson MD  12/9/2024 6:08 PM EST  Workstation ID: [id number]    XR Chest 1 View    Result Date: 12/9/2024  Impression: No acute process identified Electronically Signed: Moses Gibson MD  12/9/2024 6:04 PM EST  Workstation ID: [id number]     Current medications     Scheduled Meds:   [Held by provider] aspirin, 81 mg, Oral, Daily  [Held by provider] atorvastatin, 10 mg, Oral,  Daily  carvedilol, 3.125 mg, Nasogastric, BID With Meals  cefTRIAXone, 2,000 mg, Intravenous, Q24H  folic acid 1 mg in sodium chloride 0.9 % 50 mL IVPB, 1 mg, Intravenous, Daily  heparin (porcine), 5,000 Units, Subcutaneous, Q8H  insulin glargine, 30 Units, Subcutaneous, Daily  insulin lispro, 2-9 Units, Subcutaneous, Q6H  mupirocin, 1 Application, Each Nare, BID  PHENobarbital, 3 mg/kg (Ideal), Intravenous, Once  [START ON 12/12/2024] PHENobarbital, 65 mg, Intravenous, Once   Followed by  [START ON 12/12/2024] PHENobarbital, 65 mg, Intravenous, Once   Followed by  [START ON 12/13/2024] PHENobarbital, 32.4 mg, Oral, Once   Followed by  [START ON 12/13/2024] PHENobarbital, 32.4 mg, Oral, Once   Followed by  [START ON 12/14/2024] PHENobarbital, 32.4 mg, Oral, Once  senna-docusate sodium, 2 tablet, Oral, BID  sodium chloride, 10 mL, Intravenous, Q12H  thiamine (B-1) IV, 200 mg, Intravenous, Q8H   Followed by  [START ON 12/15/2024] thiamine, 100 mg, Oral, Daily        Continuous Infusions:   sodium bicarbonate 8.4 % 100 mEq in sodium chloride 0.45 % 1,000 mL infusion (less than/equal to 100 mEq), 100 mEq, Last Rate: 100 mEq (12/11/24 0514)      Plan discussed with RN. Reviewed all other data in the last 24 hours, including but not limited to vitals, labs, microbiology, imaging and pertinent notes from other providers.  Plan also discussed with patient's family at the bedside.    EFREN Murray   Critical Care  12/11/24   13:30 EST

## 2024-12-11 NOTE — ACP (ADVANCE CARE PLANNING)
Social Work Assessment  [facility]     Patient Name: Colten Yanez Jr.  MRN: [id number]  Today's Date: 12/11/2024    Admit Date: 12/9/2024     Demographic Summary       Row Name 12/11/24 9575       General Information    Reason for Consult decision-making    General Information Comments SW reviewed chart and noted pt has no ADs on file and his parents are listed contacts. SW met with pt/family at bedside in room 2313 to ascertain legal NOK. Pt sleeping, did not participate in conversation. Pt's Aunt Anca at bedside and reports pt is not  and has 2 minor children in high school. She reports pt's parents are , both are listed contacts. According to  1119, pt's parents are legal NOK.             JOHN PAUL Spann, W  Medical Social Worker  Ph [phone]  Fax [phone]  [email or web address]

## 2024-12-11 NOTE — CASE MANAGEMENT/SOCIAL WORK
Continued Stay Note   Amrik     Patient Name: Colten Yanez Jr.  MRN: [id number]  Today's Date: 12/11/2024    Admit Date: 12/9/2024    Plan: From home alone, pending clinical course and PT/OT eval.   Discharge Plan       Row Name 12/11/24 0915       Plan    Plan From home alone, pending clinical course and PT/OT eval.    Plan Comments DC Barriers: PT/OT eval pending, DKA protocol, CIWA protocol, NG TF, NPO, FC, IV Abxs, restraints, Bicarb gtt, DM educator following.                 Expected Discharge Date and Time       Expected Discharge Date Expected Discharge Time    Dec 12, 2024               FABIENNE Louis RN  ICU/CVU   O: [phone]  C: [phone]  [email or web address]

## 2024-12-11 NOTE — CONSULTS
"Nutrition Services    Patient Name: Colten Yanez Jr.  YOB: 1965  MRN: [id number]  Admission date: 12/9/2024    Comment:  -- Begin Diabetisource AC at 30 mL/hour + 20 mL/hour water flush       CLINICAL NUTRITION ASSESSMENT      Reason for Assessment 12/11: Consult for tube feeding       H&P      Past Medical History:   Diagnosis Date    Asthma     Coronary artery disease     Diabetes 1.5, managed as type 1     Hyperlipidemia        Past Surgical History:   Procedure Laterality Date    APPENDECTOMY      CARDIAC CATHETERIZATION      CORONARY ANGIOPLASTY WITH STENT PLACEMENT      SINUS SURGERY      TONSILLECTOMY          Current Problems   Diabetic ketoacidosis, in type 1.5 diabetic  Found unresponsive   Hypothermia   AMS  - Diabetes educator following    Severe Sepsis     Acute kidney injury     ? ETOH abuse  Hyponatremia  Hyperkalemia  Hypophosphatemia     CAD     Hyperlipidemia       Encounter Information        Trending Narrative     12/11: Admitted after being found down at home and diagnosed with DKA.  Patient dicussed in AM rounds.  MD would like to place DHT for medication and nutrition.           Anthropometrics        Current Height, Weight Height: 175.3 cm (69\")  Weight: 81.9 kg (180 lb 8.9 oz) (12/11/24 0514)       Usual Body Weight (UBW) Unable to obtain from patient        Trending Weight Hx     This admission: 12/11: 180#             PTA: 5.2% weight loss x 14 months     Wt Readings from Last 30 Encounters:   12/11/24 0514 81.9 kg (180 lb 8.9 oz)   12/09/24 1722 86.5 kg (190 lb 11.2 oz)   10/02/23 1038 86.5 kg (190 lb 12.8 oz)   05/02/22 1038 91.6 kg (202 lb)   11/01/21 1118 94.8 kg (209 lb)   05/05/21 0949 90.3 kg (199 lb)   11/02/20 1503 87.5 kg (193 lb)   05/04/20 1448 88.5 kg (195 lb)   04/20/20 1432 89.4 kg (197 lb)   10/30/19 1319 89.4 kg (197 lb)   10/14/19 1553 88.5 kg (195 lb)   04/09/19 1610 91.1 kg (200 lb 12 oz)   10/11/18 1606 89.9 kg (198 lb 4 oz)   04/09/18 1537 87.9 kg (193 " lb 12 oz)   01/08/18 1525 86.4 kg (190 lb 8 oz)   11/14/17 0840 88 kg (194 lb)   11/08/17 1330 88.6 kg (195 lb 4 oz)   05/15/17 1455 86.6 kg (191 lb)   09/28/16 0852 83.5 kg (184 lb)   03/09/16 0855 86.2 kg (190 lb)   01/10/14 1328 91.2 kg (200 lb 15.9 oz)      BMI kg/m2 Body mass index is 26.66 kg/m².       Labs        Pertinent Labs    Results from last 7 days   Lab Units 12/11/24  0509 12/10/24  1615 12/10/24  1207 12/10/24  0804 12/10/24  0518 12/09/24  2148 12/09/24  1906   SODIUM mmol/L 144 144 142   < > 142   < > 128*   POTASSIUM mmol/L 4.0 4.2 4.3   < > 4.4   < > 5.6*   CHLORIDE mmol/L 110* 115* 115*   < > 111*   < > 88*   CO2 mmol/L 16.4* 18.8* 14.1*   < > 15.6*   < > 4.2*   BUN mg/dL 42* 52* 53*   < > 53*   < > 52*   CREATININE mg/dL 1.85* 2.67* 2.54*   < > 2.93*   < > 3.25*   CALCIUM mg/dL 9.2 9.2 9.2   < > 9.5   < > 10.0   BILIRUBIN mg/dL 0.2  --   --   --  0.3  --  0.2   ALK PHOS U/L 108  --   --   --  114  --  153*   ALT (SGPT) U/L 20  --   --   --  16  --  19   AST (SGOT) U/L 39  --   --   --  24  --  21   GLUCOSE mg/dL 216* 87 143*   < > 220*   < > 1,172*    < > = values in this interval not displayed.     Results from last 7 days   Lab Units 12/11/24  0509 12/10/24  1615 12/10/24  1207   MAGNESIUM mg/dL 2.2 2.4 2.4   PHOSPHORUS mg/dL 2.3* 1.8* 1.5*   HEMOGLOBIN g/dL 12.9*  --   --    HEMATOCRIT % 35.4*  --   --      Lab Results   Component Value Date    HGBA1C 13.10 (H) 12/09/2024        Medications    Scheduled Medications [Held by provider] aspirin, 81 mg, Oral, Daily  [Held by provider] atorvastatin, 10 mg, Oral, Daily  carvedilol, 3.125 mg, Nasogastric, BID With Meals  cefTRIAXone, 2,000 mg, Intravenous, Q24H  folic acid 1 mg in sodium chloride 0.9 % 50 mL IVPB, 1 mg, Intravenous, Daily  heparin (porcine), 5,000 Units, Subcutaneous, Q8H  insulin glargine, 30 Units, Subcutaneous, Daily  insulin lispro, 2-9 Units, Subcutaneous, Q6H  mupirocin, 1 Application, Each Nare, BID  PHENobarbital, 3 mg/kg  (Ideal), Intravenous, Once   Followed by  PHENobarbital, 3 mg/kg (Ideal), Intravenous, Once  [START ON 12/12/2024] PHENobarbital, 65 mg, Intravenous, Once   Followed by  [START ON 12/12/2024] PHENobarbital, 65 mg, Intravenous, Once   Followed by  [START ON 12/13/2024] PHENobarbital, 32.4 mg, Oral, Once   Followed by  [START ON 12/13/2024] PHENobarbital, 32.4 mg, Oral, Once   Followed by  [START ON 12/14/2024] PHENobarbital, 32.4 mg, Oral, Once  senna-docusate sodium, 2 tablet, Oral, BID  sodium chloride, 10 mL, Intravenous, Q12H  thiamine (B-1) IV, 200 mg, Intravenous, Q8H   Followed by  [START ON 12/15/2024] thiamine, 100 mg, Oral, Daily        Infusions sodium bicarbonate 8.4 % 100 mEq in sodium chloride 0.45 % 1,000 mL infusion (less than/equal to 100 mEq), 100 mEq, Last Rate: 100 mEq (12/11/24 0514)        PRN Medications   acetaminophen    senna-docusate sodium **AND** polyethylene glycol **AND** bisacodyl **AND** bisacodyl    Calcium Replacement - Follow Nurse / BPA Driven Protocol    dextrose    dextrose    glucagon (human recombinant)    LORazepam **OR** LORazepam **OR** LORazepam **OR** LORazepam **OR** LORazepam **OR** LORazepam    Magnesium Standard Dose Replacement - Follow Nurse / BPA Driven Protocol    nitroglycerin    ondansetron ODT **OR** [DISCONTINUED] ondansetron    Phosphorus Replacement - Follow Nurse / BPA Driven Protocol    Potassium Replacement - Follow Nurse / BPA Driven Protocol    sodium chloride     Physical Findings        Trending Physical   Appearance, NFPE 12/11: MEMO   --  Edema  No edema documented      Bowel Function Last documented BM 12/11 (today)     Tubes NG tube      Chewing/Swallowing No known issues      Skin Intact        Estimated/Assessed Needs    Estimated 12/11/24   Energy Requirements    EST Needs, Method, Wt used 6537-4496 kcal/day (25-30 kcal/kg of CBW 81.9 kg)       Protein Requirements    EST Needs, Method, Wt used  g/day (1.2-1.5 g/kg of CBW 8139 kg)        Fluid Requirements     Estimated Needs (mL/day) 1 mL/kcal, will monitor hydration status      Fluid Deficit    Current Na Level (mEq/L)    Desired Na Level (mEq/L)    Estimated Fluid Deficit (L)       Current Nutrition Orders & Evaluation of Intake       Oral Nutrition     Food Allergies NKFA   Current PO Diet NPO Diet NPO Type: Strict NPO   Supplement None ordered    PO Evaluation     Trending % PO Intake 12/11: NPO     Enteral Nutrition    Enteral Route    Order, Modulars, Flushes    Tolerance    TF Observation         Parenteral Nutrition     TPN Route    Total # Days on TPN    TPN Order, Lipid Details    MVI & Trace Element Freq    TPN Observation       Nutritional Risk Screening        NRS-2002 Score          Nutrition Diagnosis         Nutrition Dx Problem 1 Inadequate oral intake related to clinical course as evidenced by NPO.        Nutrition Dx Problem 2 Altered nutrition related labs related to change in ability to metabolize as evidenced by hyperglycemia.        Intervention Goal         Intervention Goal(s) Glucose WNL less than 180 mg/dL  Begin and tolerate EN  No weight loss     Nutrition Intervention        RD Action Order EN     Nutrition Prescription          Diet Prescription NPO   Supplement Prescription      Enteral Prescription Initial Goal:  *initial goal conservative d/t risk of RFS     Diabetisource AC at 30 mL/hr + 20 mL/hr water flush      End Goal:    Diabetisource AC at 85 mL/hr + water flush per clinical pictures     Calories  2244 kcals (in range)    Protein  112 g (in range)    Free water  1533 mL   Flushes  Will monitor hydration status      The above end goal rate is for 22 hrs/day to assume interruptions for ADLs. Water flushes adjusted based on clinical picture + Rx flushes/IV fluids    Specialized formula chosen r/t hyperglycemia, DKA on admission.           TPN Prescription      Monitor/Evaluation        Monitor Per protocol, I&O, Pertinent labs, EN delivery/tolerance, Weight,  Hemodynamic stability       Electronically signed by:  Belle Concepcion RD  12/11/24 13:14 EST

## 2024-12-12 ENCOUNTER — APPOINTMENT (OUTPATIENT)
Dept: CT IMAGING | Facility: HOSPITAL | Age: 59
DRG: 638 | End: 2024-12-12

## 2024-12-12 LAB
ALBUMIN SERPL-MCNC: 3.3 G/DL (ref 3.5–5.2)
ALBUMIN/GLOB SERPL: 1.5 G/DL
ALP SERPL-CCNC: 98 U/L (ref 39–117)
ALT SERPL W P-5'-P-CCNC: 21 U/L (ref 1–41)
AMMONIA BLD-SCNC: 38 UMOL/L (ref 16–60)
ANION GAP SERPL CALCULATED.3IONS-SCNC: 11.5 MMOL/L (ref 5–15)
ANION GAP SERPL CALCULATED.3IONS-SCNC: 8.7 MMOL/L (ref 5–15)
AST SERPL-CCNC: 44 U/L (ref 1–40)
BASOPHILS # BLD AUTO: 0.01 10*3/MM3 (ref 0–0.2)
BASOPHILS NFR BLD AUTO: 0.1 % (ref 0–1.5)
BILIRUB SERPL-MCNC: 0.3 MG/DL (ref 0–1.2)
BUN SERPL-MCNC: 15 MG/DL (ref 6–20)
BUN SERPL-MCNC: 20 MG/DL (ref 6–20)
BUN/CREAT SERPL: 18.3 (ref 7–25)
BUN/CREAT SERPL: 21.1 (ref 7–25)
CALCIUM SPEC-SCNC: 8.6 MG/DL (ref 8.6–10.5)
CALCIUM SPEC-SCNC: 9.6 MG/DL (ref 8.6–10.5)
CHLORIDE SERPL-SCNC: 111 MMOL/L (ref 98–107)
CHLORIDE SERPL-SCNC: 111 MMOL/L (ref 98–107)
CO2 SERPL-SCNC: 26.5 MMOL/L (ref 22–29)
CO2 SERPL-SCNC: 28.3 MMOL/L (ref 22–29)
CREAT SERPL-MCNC: 0.82 MG/DL (ref 0.76–1.27)
CREAT SERPL-MCNC: 0.95 MG/DL (ref 0.76–1.27)
DEPRECATED RDW RBC AUTO: 48.3 FL (ref 37–54)
EGFRCR SERPLBLD CKD-EPI 2021: 102.5 ML/MIN/1.73
EGFRCR SERPLBLD CKD-EPI 2021: 93.4 ML/MIN/1.73
EOSINOPHIL # BLD AUTO: 0 10*3/MM3 (ref 0–0.4)
EOSINOPHIL NFR BLD AUTO: 0 % (ref 0.3–6.2)
ERYTHROCYTE [DISTWIDTH] IN BLOOD BY AUTOMATED COUNT: 14.1 % (ref 12.3–15.4)
GLOBULIN UR ELPH-MCNC: 2.2 GM/DL
GLUCOSE BLDC GLUCOMTR-MCNC: 133 MG/DL (ref 70–105)
GLUCOSE BLDC GLUCOMTR-MCNC: 188 MG/DL (ref 70–105)
GLUCOSE BLDC GLUCOMTR-MCNC: 228 MG/DL (ref 70–105)
GLUCOSE BLDC GLUCOMTR-MCNC: 251 MG/DL (ref 70–105)
GLUCOSE BLDC GLUCOMTR-MCNC: 83 MG/DL (ref 70–105)
GLUCOSE SERPL-MCNC: 195 MG/DL (ref 65–99)
GLUCOSE SERPL-MCNC: 68 MG/DL (ref 65–99)
HCT VFR BLD AUTO: 33.2 % (ref 37.5–51)
HGB BLD-MCNC: 11.8 G/DL (ref 13–17.7)
IMM GRANULOCYTES # BLD AUTO: 0.02 10*3/MM3 (ref 0–0.05)
IMM GRANULOCYTES NFR BLD AUTO: 0.2 % (ref 0–0.5)
LYMPHOCYTES # BLD AUTO: 1.77 10*3/MM3 (ref 0.7–3.1)
LYMPHOCYTES NFR BLD AUTO: 21.6 % (ref 19.6–45.3)
MAGNESIUM SERPL-MCNC: 2.2 MG/DL (ref 1.6–2.6)
MAGNESIUM SERPL-MCNC: 2.3 MG/DL (ref 1.6–2.6)
MCH RBC QN AUTO: 33.4 PG (ref 26.6–33)
MCHC RBC AUTO-ENTMCNC: 35.5 G/DL (ref 31.5–35.7)
MCV RBC AUTO: 94.1 FL (ref 79–97)
MONOCYTES # BLD AUTO: 0.64 10*3/MM3 (ref 0.1–0.9)
MONOCYTES NFR BLD AUTO: 7.8 % (ref 5–12)
NEUTROPHILS NFR BLD AUTO: 5.74 10*3/MM3 (ref 1.7–7)
NEUTROPHILS NFR BLD AUTO: 70.3 % (ref 42.7–76)
NRBC BLD AUTO-RTO: 0 /100 WBC (ref 0–0.2)
PHOSPHATE SERPL-MCNC: 1.8 MG/DL (ref 2.5–4.5)
PHOSPHATE SERPL-MCNC: 2.1 MG/DL (ref 2.5–4.5)
PLATELET # BLD AUTO: 162 10*3/MM3 (ref 140–450)
PMV BLD AUTO: 10.7 FL (ref 6–12)
POTASSIUM SERPL-SCNC: 3.2 MMOL/L (ref 3.5–5.2)
POTASSIUM SERPL-SCNC: 3.6 MMOL/L (ref 3.5–5.2)
PROT SERPL-MCNC: 5.5 G/DL (ref 6–8.5)
RBC # BLD AUTO: 3.53 10*6/MM3 (ref 4.14–5.8)
SODIUM SERPL-SCNC: 148 MMOL/L (ref 136–145)
SODIUM SERPL-SCNC: 149 MMOL/L (ref 136–145)
WBC NRBC COR # BLD AUTO: 8.18 10*3/MM3 (ref 3.4–10.8)

## 2024-12-12 PROCEDURE — 25010000002 PHENOBARBITAL PER 120 MG: Performed by: NURSE PRACTITIONER

## 2024-12-12 PROCEDURE — 70450 CT HEAD/BRAIN W/O DYE: CPT

## 2024-12-12 PROCEDURE — 63710000001 INSULIN GLARGINE PER 5 UNITS: Performed by: NURSE PRACTITIONER

## 2024-12-12 PROCEDURE — 83735 ASSAY OF MAGNESIUM: CPT | Performed by: NURSE PRACTITIONER

## 2024-12-12 PROCEDURE — 25010000002 LORAZEPAM PER 2 MG: Performed by: NURSE PRACTITIONER

## 2024-12-12 PROCEDURE — 25010000002 THIAMINE HCL 200 MG/2ML SOLUTION: Performed by: NURSE PRACTITIONER

## 2024-12-12 PROCEDURE — 25010000002 HEPARIN (PORCINE) PER 1000 UNITS: Performed by: EMERGENCY MEDICINE

## 2024-12-12 PROCEDURE — 82948 REAGENT STRIP/BLOOD GLUCOSE: CPT

## 2024-12-12 PROCEDURE — 84100 ASSAY OF PHOSPHORUS: CPT | Performed by: EMERGENCY MEDICINE

## 2024-12-12 PROCEDURE — 84100 ASSAY OF PHOSPHORUS: CPT | Performed by: NURSE PRACTITIONER

## 2024-12-12 PROCEDURE — 82140 ASSAY OF AMMONIA: CPT | Performed by: NURSE PRACTITIONER

## 2024-12-12 PROCEDURE — 80053 COMPREHEN METABOLIC PANEL: CPT | Performed by: NURSE PRACTITIONER

## 2024-12-12 PROCEDURE — 25010000002 LABETALOL 5 MG/ML SOLUTION

## 2024-12-12 PROCEDURE — 63710000001 INSULIN GLARGINE PER 5 UNITS: Performed by: EMERGENCY MEDICINE

## 2024-12-12 PROCEDURE — 74176 CT ABD & PELVIS W/O CONTRAST: CPT

## 2024-12-12 PROCEDURE — 63710000001 INSULIN LISPRO (HUMAN) PER 5 UNITS: Performed by: NURSE PRACTITIONER

## 2024-12-12 PROCEDURE — 85025 COMPLETE CBC W/AUTO DIFF WBC: CPT | Performed by: NURSE PRACTITIONER

## 2024-12-12 PROCEDURE — 25010000002 CEFTRIAXONE PER 250 MG: Performed by: EMERGENCY MEDICINE

## 2024-12-12 RX ORDER — AMOXICILLIN 250 MG
2 CAPSULE ORAL 2 TIMES DAILY
Status: DISCONTINUED | OUTPATIENT
Start: 2024-12-12 | End: 2024-12-16 | Stop reason: HOSPADM

## 2024-12-12 RX ORDER — POTASSIUM CHLORIDE 1.5 G/1.58G
40 POWDER, FOR SOLUTION ORAL EVERY 4 HOURS
Status: COMPLETED | OUTPATIENT
Start: 2024-12-12 | End: 2024-12-12

## 2024-12-12 RX ORDER — BISACODYL 5 MG/1
5 TABLET, DELAYED RELEASE ORAL DAILY PRN
Status: DISCONTINUED | OUTPATIENT
Start: 2024-12-12 | End: 2024-12-16 | Stop reason: HOSPADM

## 2024-12-12 RX ORDER — PHENOBARBITAL 32.4 MG/1
32.4 TABLET ORAL ONCE
Status: COMPLETED | OUTPATIENT
Start: 2024-12-14 | End: 2024-12-14

## 2024-12-12 RX ORDER — BISACODYL 10 MG
10 SUPPOSITORY, RECTAL RECTAL DAILY PRN
Status: DISCONTINUED | OUTPATIENT
Start: 2024-12-12 | End: 2024-12-16 | Stop reason: HOSPADM

## 2024-12-12 RX ORDER — PHENOBARBITAL 32.4 MG/1
32.4 TABLET ORAL ONCE
Status: COMPLETED | OUTPATIENT
Start: 2024-12-13 | End: 2024-12-13

## 2024-12-12 RX ORDER — POLYETHYLENE GLYCOL 3350 17 G/17G
17 POWDER, FOR SOLUTION ORAL DAILY
Status: DISCONTINUED | OUTPATIENT
Start: 2024-12-12 | End: 2024-12-16 | Stop reason: HOSPADM

## 2024-12-12 RX ADMIN — INSULIN LISPRO 3 UNITS: 100 INJECTION, SOLUTION INTRAVENOUS; SUBCUTANEOUS at 23:53

## 2024-12-12 RX ADMIN — THIAMINE HYDROCHLORIDE 200 MG: 100 INJECTION, SOLUTION INTRAMUSCULAR; INTRAVENOUS at 06:15

## 2024-12-12 RX ADMIN — POTASSIUM CHLORIDE 40 MEQ: 1.5 POWDER, FOR SOLUTION ORAL at 16:42

## 2024-12-12 RX ADMIN — LABETALOL HYDROCHLORIDE 10 MG: 5 INJECTION, SOLUTION INTRAVENOUS at 15:09

## 2024-12-12 RX ADMIN — Medication 2 PACKET: at 16:42

## 2024-12-12 RX ADMIN — LORAZEPAM 1 MG: 2 INJECTION INTRAMUSCULAR; INTRAVENOUS at 11:11

## 2024-12-12 RX ADMIN — LORAZEPAM 1 MG: 2 INJECTION INTRAMUSCULAR; INTRAVENOUS at 01:53

## 2024-12-12 RX ADMIN — PHENOBARBITAL SODIUM 65 MG: 65 INJECTION, SOLUTION INTRAMUSCULAR; INTRAVENOUS at 14:12

## 2024-12-12 RX ADMIN — POLYETHYLENE GLYCOL 3350 17 G: 17 POWDER, FOR SOLUTION ORAL at 11:48

## 2024-12-12 RX ADMIN — THIAMINE HYDROCHLORIDE 200 MG: 100 INJECTION, SOLUTION INTRAMUSCULAR; INTRAVENOUS at 14:12

## 2024-12-12 RX ADMIN — INSULIN LISPRO 8 UNITS: 100 INJECTION, SOLUTION INTRAVENOUS; SUBCUTANEOUS at 11:48

## 2024-12-12 RX ADMIN — INSULIN GLARGINE 10 UNITS: 100 INJECTION, SOLUTION SUBCUTANEOUS at 11:48

## 2024-12-12 RX ADMIN — Medication 10 ML: at 08:27

## 2024-12-12 RX ADMIN — LABETALOL HYDROCHLORIDE 10 MG: 5 INJECTION, SOLUTION INTRAVENOUS at 01:53

## 2024-12-12 RX ADMIN — SENNOSIDES AND DOCUSATE SODIUM 2 TABLET: 50; 8.6 TABLET ORAL at 20:26

## 2024-12-12 RX ADMIN — POTASSIUM CHLORIDE 40 MEQ: 1.5 POWDER, FOR SOLUTION ORAL at 08:31

## 2024-12-12 RX ADMIN — CARVEDILOL 3.12 MG: 3.12 TABLET, FILM COATED ORAL at 18:13

## 2024-12-12 RX ADMIN — MUPIROCIN 1 APPLICATION: 20 OINTMENT TOPICAL at 08:27

## 2024-12-12 RX ADMIN — HEPARIN SODIUM 5000 UNITS: 5000 INJECTION INTRAVENOUS; SUBCUTANEOUS at 14:12

## 2024-12-12 RX ADMIN — THIAMINE HYDROCHLORIDE 200 MG: 100 INJECTION, SOLUTION INTRAMUSCULAR; INTRAVENOUS at 23:44

## 2024-12-12 RX ADMIN — HEPARIN SODIUM 5000 UNITS: 5000 INJECTION INTRAVENOUS; SUBCUTANEOUS at 23:44

## 2024-12-12 RX ADMIN — SENNOSIDES AND DOCUSATE SODIUM 2 TABLET: 50; 8.6 TABLET ORAL at 08:27

## 2024-12-12 RX ADMIN — ASPIRIN 81 MG CHEWABLE TABLET 81 MG: 81 TABLET CHEWABLE at 08:26

## 2024-12-12 RX ADMIN — HEPARIN SODIUM 5000 UNITS: 5000 INJECTION INTRAVENOUS; SUBCUTANEOUS at 06:03

## 2024-12-12 RX ADMIN — INSULIN LISPRO 5 UNITS: 100 INJECTION, SOLUTION INTRAVENOUS; SUBCUTANEOUS at 06:03

## 2024-12-12 RX ADMIN — ATORVASTATIN CALCIUM 10 MG: 10 TABLET ORAL at 08:27

## 2024-12-12 RX ADMIN — INSULIN GLARGINE 30 UNITS: 100 INJECTION, SOLUTION SUBCUTANEOUS at 08:26

## 2024-12-12 RX ADMIN — Medication 10 ML: at 20:28

## 2024-12-12 RX ADMIN — CARVEDILOL 3.12 MG: 3.12 TABLET, FILM COATED ORAL at 08:26

## 2024-12-12 RX ADMIN — MUPIROCIN 1 APPLICATION: 20 OINTMENT TOPICAL at 20:26

## 2024-12-12 RX ADMIN — POTASSIUM CHLORIDE 40 MEQ: 1.5 POWDER, FOR SOLUTION ORAL at 06:03

## 2024-12-12 RX ADMIN — CEFTRIAXONE 2000 MG: 2 INJECTION, POWDER, FOR SOLUTION INTRAMUSCULAR; INTRAVENOUS at 11:47

## 2024-12-12 RX ADMIN — POTASSIUM CHLORIDE 40 MEQ: 1.5 POWDER, FOR SOLUTION ORAL at 20:26

## 2024-12-12 RX ADMIN — Medication 2 PACKET: at 06:03

## 2024-12-12 RX ADMIN — LORAZEPAM 1 MG: 2 INJECTION INTRAMUSCULAR; INTRAVENOUS at 06:04

## 2024-12-12 RX ADMIN — SODIUM BICARBONATE 650 MG: 650 TABLET ORAL at 08:27

## 2024-12-12 RX ADMIN — FOLIC ACID 1 MG: 1 TABLET ORAL at 08:27

## 2024-12-12 RX ADMIN — PHENOBARBITAL SODIUM 65 MG: 65 INJECTION INTRAMUSCULAR; INTRAVENOUS at 01:53

## 2024-12-12 NOTE — SIGNIFICANT NOTE
12/12/24 1042   OTHER   Discipline physical therapist   Rehab Time/Intention   Session Not Performed other (see comments)  (spoke with RN, reports pt not appropriate for therapy eval today, not following commands, in restraints. will f/u 12/13.)   Therapy Assessment/Plan (PT)   Criteria for Skilled Interventions Met (PT) yes   Recommendation   PT - Next Appointment 12/13/24

## 2024-12-12 NOTE — PLAN OF CARE
Patient remains on room air. Bicarb gtt. Confused and does not follow commands. Multiple incont episodes on shift.      Remains ICU patient. CIWA: 12.

## 2024-12-12 NOTE — PROGRESS NOTES
Nutrition Services  Patient Name: Colten Yanez Jr.  YOB: 1965  MRN: [id number]  Admission date: 12/9/2024    PROGRESS NOTE      Nutrition Intervention Updates: Gradual advancement in TF to 50 mL/hour     Increase water flush to 100 mL/hour        Encounter Information: Check on for TF.  NG in place For TF.  Patient discussed in AM rounds.  No pressors.         PO Diet: NPO Diet NPO Type: Strict NPO   PO Supplements: None ordered    PO Intake:  NPO       Current nutrition support: Diabetisource AC at 30 mL/hour + 50 mL/hour water flush (water flush changed per APRN 12/11 after RD previously ordered 20 mL/hour water flush)   Nutrition support review: Documented as above per EMR        Labs (reviewed below): Hypernatremia - estimated 2.0-2.7L fluid deficit per MD calc, will increase water flush  Hypokalemia - replaced today  Hypophosphatemia - replaced today       GI Function:  No documented BM since admission (x 3 days ago) - senna given per nurse this AM, MD to add scheduled Miralax        Brief weight review   Wt Readings from Last 10 Encounters:   12/12/24 0500 81 kg (178 lb 9.2 oz)   12/11/24 0514 81.9 kg (180 lb 8.9 oz)   12/09/24 1722 86.5 kg (190 lb 11.2 oz)   10/02/23 1038 86.5 kg (190 lb 12.8 oz)   05/02/22 1038 91.6 kg (202 lb)   11/01/21 1118 94.8 kg (209 lb)   05/05/21 0949 90.3 kg (199 lb)   11/02/20 1503 87.5 kg (193 lb)   05/04/20 1448 88.5 kg (195 lb)   04/20/20 1432 89.4 kg (197 lb)   10/30/19 1319 89.4 kg (197 lb)   10/14/19 1553 88.5 kg (195 lb)        Results from last 7 days   Lab Units 12/12/24  0342 12/11/24  1853 12/11/24  0509 12/10/24  0804 12/10/24  0518   SODIUM mmol/L 149* 149* 144   < > 142   POTASSIUM mmol/L 3.2* 2.8* 4.0   < > 4.4   CHLORIDE mmol/L 111* 112* 110*   < > 111*   CO2 mmol/L 26.5 25.7 16.4*   < > 15.6*   BUN mg/dL 20 24* 42*   < > 53*   CREATININE mg/dL 0.95 0.99 1.85*   < > 2.93*   CALCIUM mg/dL 8.6 8.7 9.2   < > 9.5   BILIRUBIN mg/dL 0.3  --  0.2  --  0.3    ALK PHOS U/L 98  --  108  --  114   ALT (SGPT) U/L 21  --  20  --  16   AST (SGOT) U/L 44*  --  39  --  24   GLUCOSE mg/dL 195* 86 216*   < > 220*    < > = values in this interval not displayed.     Results from last 7 days   Lab Units 12/12/24  0342 12/11/24  1853 12/11/24  0509   MAGNESIUM mg/dL 2.2 2.1 2.2   PHOSPHORUS mg/dL 2.1*  --  2.3*   HEMOGLOBIN g/dL 11.8*  --  12.9*   HEMATOCRIT % 33.2*  --  35.4*       RD to follow up per protocol.    Electronically signed by:  Belle Concepcion RD  12/12/24 07:31 EST

## 2024-12-12 NOTE — CONSULTS
"[facility] MEDICINE SERVICE  TRANSFER OF CARE/ACCEPTANCE NOTE    PATIENT NAME: Colten Yanez Jr.  : 1965  MRN: [id number]     Active Hospital Problems    Diagnosis  POA    **DKA (diabetic ketoacidosis) [E11.10]  Yes    Dyslipidemia [E78.5]  Yes    Vitamin D deficiency [E55.9]  Yes    Status post coronary artery stent placement [Z95.5]  Not Applicable    Type 1 diabetes mellitus with hyperglycemia [E10.65]  Yes      Resolved Hospital Problems   No resolved problems to display.         Interim History:  Per HPI on 24 at 0929:  \"Colten Yanez Jr. is a 59 y.o. male with PMH of Diabetes mellitus type 1.5, dyslipidemia, vitamin D deficiency, CAD with history of stent placement presented to the hospital after being found down at home, and was admitted with a principal diagnosis of DKA (diabetic ketoacidosis).  Per patient for HPI taken from chart review as patient is lethargic and very confused when he is awake.  Patient was found down by neighbors on his porch earlier this morning.  EMS was called and patient was unresponsive with a core temperature of 88.6.  Upon arrival to the emergency department he was placed on a Pedro hugger and a temperature sensing Dixon was placed.  Initially patient was unresponsive but his mental status improved with IV fluid resuscitation.  Lab work obtained was consistent with DKA and thus DKA protocol with insulin drip was initiated.  CT head was obtained and negative of any abnormality.  CXR was clear.  Lactic acid was elevated at 3.0 and patient had white count of 33.52.  Patient was given empiric antibiotics and blood cultures were obtained.  RVP was + rhinovirus.  Urinalysis was negative for UTI.  No clear bacterial source of infection could be identified.  Patient's mother at bedside states that he drinks probably 2 beers a day.  Patient awake but agitated trying to get up out of bed.  When asked if he drinks alcohol he says no.     Sepsis workup has revealed " "Klebsiella oxytoca bacteremia, antimicrobials were adjusted.       ACP: Patient does not have advanced directive on file at this facility.  Patient unable to participate in HPI due to confusion.  Patient's mother at bedside states he is a full code.\"    I have noted the following changes since admission: Patient no longer on insulin gtt, now receiving lantus and correctional humalog at moderate-high dosing. Mental status remains altered, likely secondary to alcohol withdrawal, so patient is receiving phenobarbital, thiamine, folic acid. CT head and CT abdomen/pelvis for stone protocol pending. Patient receiving potassium replacement per protocol, potassium 3.2 this morning, improved from 2.8 yesterday. Patient currently has NGT and is receiving tube feedings. Free water was increased today for hypernatremia, sodium 149 this morning. Due to improvement in status, patient deemed appropriate for downgrade from ICU to PCU.    I have reviewed the H&P, diagnostic data and plan of care for Colten Barakatavis . Please refer to progress note from 12/12/24 for full assessment and plan. Hospitalist team will be taking over care of this patient during the current hospitalization.        Signature: Electronically signed by EFREN Matthew, 12/12/24, 10:17 EST.  [facility] Hospitalist Team        *This is a nonbillable note.*   "

## 2024-12-12 NOTE — CASE MANAGEMENT/SOCIAL WORK
Continued Stay Note  VICENTE Rowley     Patient Name: Colten Yanez Jr.  MRN: [id number]  Today's Date: 12/12/2024    Admit Date: 12/9/2024    Plan: From home alone, pending clinical course and PT/OT eval.   Discharge Plan       Row Name 12/12/24 0926       Plan    Plan From home alone, pending clinical course and PT/OT eval.    Plan Comments DC Barriers: PT/OT eval pending, DKA protocol, CIWA protocol, NG TF, NPO, ext cath, IV Abxs, restraints, DM educator following.               Expected Discharge Date and Time       Expected Discharge Date Expected Discharge Time    Dec 16, 2024               FABIENNE Louis RN  ICU/CVU   O: [phone]  C: [phone]  [email or web address]

## 2024-12-12 NOTE — CONSULTS
Diabetes Education    Patient Name:  Colten Yanez Jr.  YOB: 1965  MRN: [id number]  Admit Date:  12/9/2024    Follow up note: Pt condition remains inappropriate for education at this time. Will attempt follow up on different day.       Electronically signed by:  Aylin Temple RN  12/12/24 11:25 EST

## 2024-12-12 NOTE — PROGRESS NOTES
Critical Care Progress Note     Colten Yanez Jr. : 1965 MRN:[id number] LOS:3  Rm: 2313/1     Principal Problem: DKA (diabetic ketoacidosis)     Reason for follow up: All the medical problems listed below    Summary     Colten Yanez Jr. is a 59 y.o. male with PMH of Diabetes mellitus type 1.5, dyslipidemia, vitamin D deficiency, CAD with history of stent placement presented to the hospital after being found down at home, and was admitted with a principal diagnosis of DKA (diabetic ketoacidosis).  Per patient for HPI taken from chart review as patient is lethargic and very confused when he is awake.  Patient was found down by neighbors on his porch earlier this morning.  EMS was called and patient was unresponsive with a core temperature of 88.6.  Upon arrival to the emergency department he was placed on a Pedro hugger and a temperature sensing Dixon was placed.  Initially patient was unresponsive but his mental status improved with IV fluid resuscitation.  Lab work obtained was consistent with DKA and thus DKA protocol with insulin drip was initiated.  CT head was obtained and negative of any abnormality.  CXR was clear.  Lactic acid was elevated at 3.0 and patient had white count of 33.52.  Patient was given empiric antibiotics and blood cultures were obtained.  RVP was + rhinovirus.  Urinalysis was negative for UTI.  No clear bacterial source of infection could be identified.  Patient's mother at bedside states that he drinks probably 2 beers a day.  Patient awake but agitated trying to get up out of bed.  When asked if he drinks alcohol he says no.    Sepsis workup has revealed Klebsiella oxytoca bacteremia, antimicrobials were adjusted.  Patient was transitioned from DKA.       ACP: Patient does not have advanced directive on file at this facility.  Patient unable to participate in HPI due to confusion.  Patient's mother at bedside states he is a full code.    Significant Events / Subjective      12/12/24 : Sodium bicarbonate has normalized, discontinue sodium bicarbonate tablets.  Patient with hypernatremia, free water flush increased by dietitian.  Lantus planned to be increased to 40 units, but later in the day, dropped to 80s, decreased to 35 units.  Mentation continues to be altered, but slowly improving.  CT Head: negative for acute intracranial abnormalities.  CT Abd/Pelvis Stone Protocol: negative for acute abnormality.  Downgrade to PCU, hospitalist service consulted.    Assessment / Plan     Diabetic ketoacidosis without coma, with history of diabetes mellitus, type 1.5, insulin dependent, very poorly controlled  Etiology unclear, likely secondary to acute illness.  Anion gap of 32.8 on admission.  A1c 13.10.  Transitioned off of insulin gtt on 12/10.  Lantus increased from 30 to 40 units on 12/12, but due to glucose falling into the 80s, decreased to 35 units to start on 12/13.  Accuchecks with SSI, increased SSI to moderately high tier today.  Diabetes educator consultation.    Septic Shock due to bacteremia with Klebsiella oxytoca / Rhinovirus infection   Likely due to bacteremia; etiology unclear.  CT Stone protocol: negative for acute abnormalities.  Blood cultures: BCID with Klebsiella oxytoca  RVP: + Human rhinovirus/enterovirus  MRSA Screen-negative.  Antimicrobials adjusted to ceftriaxone for planned 7-day course.  Persistent hypotension in spite of adequate fluid resuscitation.  Titrate vasopressors for a target MAP of 65. Vasopressors have been weaned off.    Acute encephalopathy, suspected delirium tremens  History of alcohol use  Reportedly per family drinks 2 cans of beers daily, family denied, but was confused at baseline.  [facility] protocol monitoring without medications ordered.  Will order as needed.  Phenobarb taper in progress.  Continue thiamine and folic acid.  CT Head (12/9): no acute intracranial abnormalities.  Repeat CT Head (12/12): negative for acute  abnormalities.  Continue antimicrobials.  Further workup as clinically indicated.  Precedex has been weaned off.    Acute Kidney Injury  Remains nonoliguric. Baseline creatinine 0.91.  Likely prerenal. Possible intrinsic component due to ATN from infection, drugs and hypotension.   C/w IV fluids as ordered.  Monitor Input/Output very closely.   Avoids NSAIDs, nephrotoxic medications, and hypotension.  Net IO Since Admission: 6,931 mL [12/12/24 0929]     Metabolic acidosis  Anion gap closed, continued low Bicarb of 15.1--14.1.  Changing IVF to sodium bicarbonate gtt. Sodium bicarbonate tablets added.  Continue to monitor and trend labs.    Coronary artery disease involving native coronary artery of native heart without angina pectoris  Chronic and stable.   HS Troponin 42 with reflex of 40 & 36.  EKG reviewed independently (12/10): Normal sinus rhythm, UT depression with ST elevation in multiple leads, nonfocal, without reciprocal changes, heart rate 68, QTc 438.  Resume aspirin and statin therapy.      Essential hypertension: not well controlled.   Coreg initiated.  Titrate medications as needed.    Dyslipidemia: Continue statin therapy.    Disposition: Downgrade to PCU, hospitalist consulted.    Code status:   Code Status (Patient has no pulse and is not breathing): CPR (Attempt to Resuscitate)  Medical Interventions (Patient has pulse or is breathing): Full Support       Nutrition:   NPO Diet NPO Type: Strict NPO   Tube Feeding: Formula: Diabetisource AC (Glucerna 1.2); Feeding Type: Continuous; Start at: 30 mL/hr; Then Advance By: 10 mL/hr; Every: 4 hours; To Goal Rate of: 50 mL/hr; Water Flush: Other; Other: 100; Every: 1 hour; Water Bolus: None     VTE Prophylaxis:  Pharmacologic & mechanical VTE prophylaxis orders are present.       Objective / Physical Exam     Vital signs:  Temp: 98.9 °F (37.2 °C)  BP: 137/70  Heart Rate: 92  Resp: 14  SpO2: 96 %  Weight: 81 kg (178 lb 9.2 oz)    Admission Weight: Weight:  86.5 kg (190 lb 11.2 oz)  Current Weight: Weight: 81 kg (178 lb 9.2 oz)    Input/Output in last 24 hours:    Intake/Output Summary (Last 24 hours) at 12/12/2024 0929  Last data filed at 12/12/2024 0800  Gross per 24 hour   Intake 2260 ml   Output 850 ml   Net 1410 ml      Net IO Since Admission: 6,931 mL [12/12/24 0929]     Physical Exam  Vitals and nursing note reviewed.   Constitutional:       General: He is not in acute distress.     Appearance: He is ill-appearing. He is not toxic-appearing or diaphoretic.   HENT:      Head: Normocephalic.      Nose: Nose normal. No congestion.      Mouth/Throat:      Mouth: Mucous membranes are moist.      Pharynx: Oropharynx is clear. No oropharyngeal exudate.   Eyes:      Conjunctiva/sclera: Conjunctivae normal.      Pupils: Pupils are equal, round, and reactive to light.   Cardiovascular:      Rate and Rhythm: Regular rhythm. Tachycardia present.      Pulses: Normal pulses.      Heart sounds: Normal heart sounds.      No friction rub.   Pulmonary:      Effort: Pulmonary effort is normal. No respiratory distress.      Comments: Diminished bibasilar breath sounds without rhonchi, wheezes or rales  Abdominal:      General: There is no distension.      Palpations: Abdomen is soft.      Tenderness: There is no abdominal tenderness.   Musculoskeletal:      Cervical back: Neck supple.      Right lower leg: No edema.      Left lower leg: No edema.   Skin:     General: Skin is warm and dry.      Findings: No rash.   Neurological:      General: No focal deficit present.      Mental Status: He is alert. He is disoriented.      Comments: Confused, moving all extremities.   Psychiatric:      Comments: Anxious/restless.         Radiology and Labs     Results from last 7 days   Lab Units 12/12/24  0342 12/11/24  0509 12/10/24  0518 12/09/24  1721 12/09/24  1714   WBC 10*3/mm3 8.18 15.35* 19.58*  --  33.52*   HEMOGLOBIN g/dL 11.8* 12.9* 13.7  --  14.9   HEMOGLOBIN, POC g/dL  --   --   --   16.8  --    HEMATOCRIT % 33.2* 35.4* 38.0  --  49.5   HEMATOCRIT POC %  --   --   --  50  --    PLATELETS 10*3/mm3 162 205 276  --  399           Results from last 7 days   Lab Units 12/12/24  0342 12/11/24  1853 12/11/24  0509 12/10/24  1615 12/10/24  1207 12/10/24  0804   SODIUM mmol/L 149* 149* 144 144 142 141   POTASSIUM mmol/L 3.2* 2.8* 4.0 4.2 4.3 4.4   CHLORIDE mmol/L 111* 112* 110* 115* 115* 112*   CO2 mmol/L 26.5 25.7 16.4* 18.8* 14.1* 15.1*   BUN mg/dL 20 24* 42* 52* 53* 53*   CREATININE mg/dL 0.95 0.99 1.85* 2.67* 2.54* 2.66*   GLUCOSE mg/dL 195* 86 216* 87 143* 210*   MAGNESIUM mg/dL 2.2 2.1 2.2 2.4 2.4 2.5   PHOSPHORUS mg/dL 2.1*  --  2.3* 1.8* 1.5* 2.3*      Results from last 7 days   Lab Units 12/12/24  0342 12/11/24  0509 12/10/24  0518 12/09/24  1906   ALK PHOS U/L 98 108 114 153*   AST (SGOT) U/L 44* 39 24 21   ALT (SGPT) U/L 21 20 16 19     Results from last 7 days   Lab Units 12/10/24  1447 12/09/24  1721   PH, ARTERIAL pH units  --  6.810*   PCO2, ARTERIAL mm Hg  --  17.4*   PO2 ART mm Hg  --  165.4*   O2 SATURATION ART %  --  97.1   FIO2 % 21 21   HCO3 ART mmol/L  --  2.8*   BASE EXCESS ART mmol/L  --  <-30.0*       CT Abdomen Pelvis Stone Protocol    Result Date: 12/12/2024  Impression: 1.Examination is limited due to lack of IV contrast administration. 2.No acute abnormality identified within the abdomen or pelvis. 3.No hydronephrosis or urinary tract calculi identified. 4.Mild air within the bladder and likely within the prostatic urethra. This may be related to recent instrumentation. Please correlate with urinalysis to exclude urinary tract infection. 5.Additional findings as detailed above. Electronically Signed: Ghanshyam Muller MD  12/12/2024 11:30 AM EST  Workstation ID: [id number]    CT Head Without Contrast    Result Date: 12/12/2024  Impression: 1. No acute intracranial process. 2. Left maxillary sinusitis Electronically Signed: Cecil Swan  12/12/2024 11:25 AM EST  Workstation ID:  [id number]    XR Abdomen KUB    Result Date: 12/11/2024  Impression: Radiopaque tip of the enteric tube extends to the distal gastric body level Electronically Signed: Magnolia Cole MD  12/11/2024 11:15 AM EST  Workstation ID: [id number]     Current medications     Scheduled Meds:   aspirin, 81 mg, Nasogastric, Daily  atorvastatin, 10 mg, Nasogastric, Daily  carvedilol, 3.125 mg, Nasogastric, BID With Meals  cefTRIAXone, 2,000 mg, Intravenous, Q24H  folic acid, 1 mg, Nasogastric, Daily  heparin (porcine), 5,000 Units, Subcutaneous, Q8H  insulin glargine, 10 Units, Subcutaneous, Once  [START ON 12/13/2024] insulin glargine, 40 Units, Subcutaneous, Daily  insulin lispro, 3-14 Units, Subcutaneous, Q6H  mupirocin, 1 Application, Each Nare, BID  PHENobarbital, 65 mg, Intravenous, Once   Followed by  [START ON 12/13/2024] PHENobarbital, 32.4 mg, Oral, Once   Followed by  [START ON 12/13/2024] PHENobarbital, 32.4 mg, Oral, Once   Followed by  [START ON 12/14/2024] PHENobarbital, 32.4 mg, Oral, Once  senna-docusate sodium, 2 tablet, Nasogastric, BID   And  polyethylene glycol, 17 g, Nasogastric, Daily  sodium chloride, 10 mL, Intravenous, Q12H  thiamine (B-1) IV, 200 mg, Intravenous, Q8H   Followed by  [START ON 12/15/2024] thiamine, 100 mg, Nasogastric, Daily        Continuous Infusions:        Plan discussed with RN. Reviewed all other data in the last 24 hours, including but not limited to vitals, labs, microbiology, imaging and pertinent notes from other providers.  Plan also discussed with patient's family at the bedside.    EFREN Murray   Critical Care  12/12/24   09:29 EST

## 2024-12-12 NOTE — PLAN OF CARE
Goal Outcome Evaluation:     Patient now PCU level care. Only alert to self at times, not following commands. CTH/CT abd/pelvis negative for abnormalities. No BM on shift.

## 2024-12-13 LAB
ALBUMIN SERPL-MCNC: 4 G/DL (ref 3.5–5.2)
ALBUMIN/GLOB SERPL: 1.4 G/DL
ALP SERPL-CCNC: 117 U/L (ref 39–117)
ALT SERPL W P-5'-P-CCNC: 24 U/L (ref 1–41)
ANION GAP SERPL CALCULATED.3IONS-SCNC: 10.5 MMOL/L (ref 5–15)
ANION GAP SERPL CALCULATED.3IONS-SCNC: 6.5 MMOL/L (ref 5–15)
AST SERPL-CCNC: 35 U/L (ref 1–40)
BACTERIA SPEC AEROBE CULT: ABNORMAL
BACTERIA SPEC AEROBE CULT: ABNORMAL
BASOPHILS # BLD AUTO: 0.02 10*3/MM3 (ref 0–0.2)
BASOPHILS NFR BLD AUTO: 0.3 % (ref 0–1.5)
BILIRUB SERPL-MCNC: 0.5 MG/DL (ref 0–1.2)
BUN SERPL-MCNC: 14 MG/DL (ref 6–20)
BUN SERPL-MCNC: 14 MG/DL (ref 6–20)
BUN/CREAT SERPL: 17.3 (ref 7–25)
BUN/CREAT SERPL: 18.7 (ref 7–25)
CALCIUM SPEC-SCNC: 10 MG/DL (ref 8.6–10.5)
CALCIUM SPEC-SCNC: 9.5 MG/DL (ref 8.6–10.5)
CHLORIDE SERPL-SCNC: 102 MMOL/L (ref 98–107)
CHLORIDE SERPL-SCNC: 104 MMOL/L (ref 98–107)
CO2 SERPL-SCNC: 27.5 MMOL/L (ref 22–29)
CO2 SERPL-SCNC: 28.5 MMOL/L (ref 22–29)
CREAT SERPL-MCNC: 0.75 MG/DL (ref 0.76–1.27)
CREAT SERPL-MCNC: 0.81 MG/DL (ref 0.76–1.27)
DEPRECATED RDW RBC AUTO: 49.1 FL (ref 37–54)
EGFRCR SERPLBLD CKD-EPI 2021: 102.8 ML/MIN/1.73
EGFRCR SERPLBLD CKD-EPI 2021: 105.3 ML/MIN/1.73
EOSINOPHIL # BLD AUTO: 0.03 10*3/MM3 (ref 0–0.4)
EOSINOPHIL NFR BLD AUTO: 0.5 % (ref 0.3–6.2)
ERYTHROCYTE [DISTWIDTH] IN BLOOD BY AUTOMATED COUNT: 13.9 % (ref 12.3–15.4)
GLOBULIN UR ELPH-MCNC: 2.9 GM/DL
GLUCOSE BLDC GLUCOMTR-MCNC: 143 MG/DL (ref 70–105)
GLUCOSE BLDC GLUCOMTR-MCNC: 168 MG/DL (ref 70–105)
GLUCOSE BLDC GLUCOMTR-MCNC: 188 MG/DL (ref 70–105)
GLUCOSE BLDC GLUCOMTR-MCNC: 215 MG/DL (ref 70–105)
GLUCOSE BLDC GLUCOMTR-MCNC: 94 MG/DL (ref 70–105)
GLUCOSE SERPL-MCNC: 113 MG/DL (ref 65–99)
GLUCOSE SERPL-MCNC: 69 MG/DL (ref 65–99)
GRAM STN SPEC: ABNORMAL
GRAM STN SPEC: ABNORMAL
HCT VFR BLD AUTO: 38.2 % (ref 37.5–51)
HGB BLD-MCNC: 13.2 G/DL (ref 13–17.7)
IMM GRANULOCYTES # BLD AUTO: 0.02 10*3/MM3 (ref 0–0.05)
IMM GRANULOCYTES NFR BLD AUTO: 0.3 % (ref 0–0.5)
ISOLATED FROM: ABNORMAL
ISOLATED FROM: ABNORMAL
LYMPHOCYTES # BLD AUTO: 2.26 10*3/MM3 (ref 0.7–3.1)
LYMPHOCYTES NFR BLD AUTO: 37.4 % (ref 19.6–45.3)
MAGNESIUM SERPL-MCNC: 2 MG/DL (ref 1.6–2.6)
MAGNESIUM SERPL-MCNC: 2.3 MG/DL (ref 1.6–2.6)
MCH RBC QN AUTO: 33.5 PG (ref 26.6–33)
MCHC RBC AUTO-ENTMCNC: 34.6 G/DL (ref 31.5–35.7)
MCV RBC AUTO: 97 FL (ref 79–97)
MONOCYTES # BLD AUTO: 0.64 10*3/MM3 (ref 0.1–0.9)
MONOCYTES NFR BLD AUTO: 10.6 % (ref 5–12)
NEUTROPHILS NFR BLD AUTO: 3.07 10*3/MM3 (ref 1.7–7)
NEUTROPHILS NFR BLD AUTO: 50.9 % (ref 42.7–76)
NRBC BLD AUTO-RTO: 0 /100 WBC (ref 0–0.2)
PHOSPHATE SERPL-MCNC: 2.2 MG/DL (ref 2.5–4.5)
PHOSPHATE SERPL-MCNC: 3.2 MG/DL (ref 2.5–4.5)
PHOSPHATE SERPL-MCNC: 3.6 MG/DL (ref 2.5–4.5)
PLATELET # BLD AUTO: 153 10*3/MM3 (ref 140–450)
PMV BLD AUTO: 10.6 FL (ref 6–12)
POTASSIUM SERPL-SCNC: 3.6 MMOL/L (ref 3.5–5.2)
POTASSIUM SERPL-SCNC: 3.9 MMOL/L (ref 3.5–5.2)
POTASSIUM SERPL-SCNC: 4.5 MMOL/L (ref 3.5–5.2)
PROT SERPL-MCNC: 6.9 G/DL (ref 6–8.5)
RBC # BLD AUTO: 3.94 10*6/MM3 (ref 4.14–5.8)
SODIUM SERPL-SCNC: 137 MMOL/L (ref 136–145)
SODIUM SERPL-SCNC: 142 MMOL/L (ref 136–145)
WBC NRBC COR # BLD AUTO: 6.04 10*3/MM3 (ref 3.4–10.8)

## 2024-12-13 PROCEDURE — 84100 ASSAY OF PHOSPHORUS: CPT

## 2024-12-13 PROCEDURE — 84100 ASSAY OF PHOSPHORUS: CPT | Performed by: INTERNAL MEDICINE

## 2024-12-13 PROCEDURE — 83735 ASSAY OF MAGNESIUM: CPT | Performed by: NURSE PRACTITIONER

## 2024-12-13 PROCEDURE — 80053 COMPREHEN METABOLIC PANEL: CPT | Performed by: NURSE PRACTITIONER

## 2024-12-13 PROCEDURE — 63710000001 INSULIN GLARGINE PER 5 UNITS: Performed by: NURSE PRACTITIONER

## 2024-12-13 PROCEDURE — 63710000001 INSULIN LISPRO (HUMAN) PER 5 UNITS: Performed by: NURSE PRACTITIONER

## 2024-12-13 PROCEDURE — 25010000002 HEPARIN (PORCINE) PER 1000 UNITS: Performed by: EMERGENCY MEDICINE

## 2024-12-13 PROCEDURE — 82948 REAGENT STRIP/BLOOD GLUCOSE: CPT

## 2024-12-13 PROCEDURE — 25010000002 CEFTRIAXONE PER 250 MG: Performed by: EMERGENCY MEDICINE

## 2024-12-13 PROCEDURE — 25810000003 SODIUM CHLORIDE 0.9 % SOLUTION

## 2024-12-13 PROCEDURE — 85025 COMPLETE CBC W/AUTO DIFF WBC: CPT | Performed by: NURSE PRACTITIONER

## 2024-12-13 PROCEDURE — 25010000002 THIAMINE HCL 200 MG/2ML SOLUTION: Performed by: NURSE PRACTITIONER

## 2024-12-13 PROCEDURE — 84132 ASSAY OF SERUM POTASSIUM: CPT | Performed by: INTERNAL MEDICINE

## 2024-12-13 PROCEDURE — 25010000002 THIAMINE PER 100 MG: Performed by: NURSE PRACTITIONER

## 2024-12-13 RX ORDER — POTASSIUM CHLORIDE 1.5 G/1.58G
40 POWDER, FOR SOLUTION ORAL EVERY 4 HOURS
Status: COMPLETED | OUTPATIENT
Start: 2024-12-13 | End: 2024-12-13

## 2024-12-13 RX ADMIN — MUPIROCIN 1 APPLICATION: 20 OINTMENT TOPICAL at 21:22

## 2024-12-13 RX ADMIN — THIAMINE HYDROCHLORIDE 200 MG: 100 INJECTION, SOLUTION INTRAMUSCULAR; INTRAVENOUS at 05:51

## 2024-12-13 RX ADMIN — THIAMINE HYDROCHLORIDE 200 MG: 100 INJECTION, SOLUTION INTRAMUSCULAR; INTRAVENOUS at 13:42

## 2024-12-13 RX ADMIN — SENNOSIDES AND DOCUSATE SODIUM 2 TABLET: 50; 8.6 TABLET ORAL at 21:22

## 2024-12-13 RX ADMIN — PHENOBARBITAL 32.4 MG: 32.4 TABLET ORAL at 02:11

## 2024-12-13 RX ADMIN — SENNOSIDES AND DOCUSATE SODIUM 2 TABLET: 50; 8.6 TABLET ORAL at 08:07

## 2024-12-13 RX ADMIN — PHENOBARBITAL 32.4 MG: 32.4 TABLET ORAL at 13:43

## 2024-12-13 RX ADMIN — ASPIRIN 81 MG CHEWABLE TABLET 81 MG: 81 TABLET CHEWABLE at 08:07

## 2024-12-13 RX ADMIN — POTASSIUM CHLORIDE 40 MEQ: 1.5 POWDER, FOR SOLUTION ORAL at 17:54

## 2024-12-13 RX ADMIN — Medication 10 ML: at 08:08

## 2024-12-13 RX ADMIN — ATORVASTATIN CALCIUM 10 MG: 10 TABLET ORAL at 08:07

## 2024-12-13 RX ADMIN — POLYETHYLENE GLYCOL 3350 17 G: 17 POWDER, FOR SOLUTION ORAL at 08:07

## 2024-12-13 RX ADMIN — POTASSIUM CHLORIDE 40 MEQ: 1.5 POWDER, FOR SOLUTION ORAL at 22:24

## 2024-12-13 RX ADMIN — CARVEDILOL 3.12 MG: 3.12 TABLET, FILM COATED ORAL at 17:35

## 2024-12-13 RX ADMIN — INSULIN LISPRO 3 UNITS: 100 INJECTION, SOLUTION INTRAVENOUS; SUBCUTANEOUS at 12:20

## 2024-12-13 RX ADMIN — FOLIC ACID 1 MG: 1 TABLET ORAL at 08:07

## 2024-12-13 RX ADMIN — INSULIN GLARGINE-YFGN 35 UNITS: 100 INJECTION, SOLUTION SUBCUTANEOUS at 08:29

## 2024-12-13 RX ADMIN — HEPARIN SODIUM 5000 UNITS: 5000 INJECTION INTRAVENOUS; SUBCUTANEOUS at 05:38

## 2024-12-13 RX ADMIN — THIAMINE HYDROCHLORIDE 200 MG: 100 INJECTION, SOLUTION INTRAMUSCULAR; INTRAVENOUS at 21:21

## 2024-12-13 RX ADMIN — LORAZEPAM 1 MG: 1 TABLET ORAL at 17:37

## 2024-12-13 RX ADMIN — Medication 10 ML: at 21:22

## 2024-12-13 RX ADMIN — CEFTRIAXONE 2000 MG: 2 INJECTION, POWDER, FOR SOLUTION INTRAMUSCULAR; INTRAVENOUS at 12:20

## 2024-12-13 RX ADMIN — SODIUM CHLORIDE 15 MMOL: 9 INJECTION, SOLUTION INTRAVENOUS at 02:04

## 2024-12-13 RX ADMIN — CARVEDILOL 3.12 MG: 3.12 TABLET, FILM COATED ORAL at 08:07

## 2024-12-13 RX ADMIN — HEPARIN SODIUM 5000 UNITS: 5000 INJECTION INTRAVENOUS; SUBCUTANEOUS at 21:21

## 2024-12-13 RX ADMIN — MUPIROCIN 1 APPLICATION: 20 OINTMENT TOPICAL at 08:07

## 2024-12-13 RX ADMIN — HEPARIN SODIUM 5000 UNITS: 5000 INJECTION INTRAVENOUS; SUBCUTANEOUS at 13:43

## 2024-12-13 NOTE — SIGNIFICANT NOTE
12/13/24 1541   OTHER   Discipline occupational therapist   Rehab Time/Intention   Session Not Performed other (see comments)  (Pt agitated with restraints in place at this time, therapy will follow-up as appropriate.)   Therapy Assessment/Plan (PT)   Criteria for Skilled Interventions Met (PT) yes   Recommendation   PT - Next Appointment 12/14/24   Recommendation   OT - Next Appointment 12/15/24

## 2024-12-13 NOTE — PROGRESS NOTES
Nutrition Services  Patient Name: Colten Yanez Jr.  YOB: 1965  MRN: [id number]  Admission date: 12/9/2024    PROGRESS NOTE      Nutrition Intervention Updates: Begin increasing to goal of 85 ml/hr   Decrease FWF to 50 ml/hr as tube feedings will also provide more water        Encounter Information: Check on for TF. Pt has now downgraded, no longer ICU status. Sodium has improved, will begin to decrease FWF. +1.7 L fluid balance.        PO Diet: NPO Diet NPO Type: Strict NPO   PO Supplements: None ordered    PO Intake:  NPO       Current nutrition support: Diabetisource AC at 50 mL/hour + 100 mL/hour water flush    Nutrition support review: Documented as above per EMR        Labs (reviewed below): Reviewed.        GI Function:  No documented BM since admission, pt is on a scheduled bowel regimen        Brief weight review   Wt Readings from Last 10 Encounters:   12/13/24 0423 83.6 kg (184 lb 4.9 oz)   12/12/24 0500 81 kg (178 lb 9.2 oz)   12/11/24 0514 81.9 kg (180 lb 8.9 oz)   12/09/24 1722 86.5 kg (190 lb 11.2 oz)   10/02/23 1038 86.5 kg (190 lb 12.8 oz)   05/02/22 1038 91.6 kg (202 lb)   11/01/21 1118 94.8 kg (209 lb)   05/05/21 0949 90.3 kg (199 lb)   11/02/20 1503 87.5 kg (193 lb)   05/04/20 1448 88.5 kg (195 lb)   04/20/20 1432 89.4 kg (197 lb)   10/30/19 1319 89.4 kg (197 lb)   10/14/19 1553 88.5 kg (195 lb)        Results from last 7 days   Lab Units 12/13/24  0438 12/13/24  0046 12/12/24  1444 12/12/24  0342 12/11/24  1853 12/11/24  0509   SODIUM mmol/L 142  --  148* 149*   < > 144   POTASSIUM mmol/L 3.9 4.5 3.6 3.2*   < > 4.0   CHLORIDE mmol/L 104  --  111* 111*   < > 110*   CO2 mmol/L 27.5  --  28.3 26.5   < > 16.4*   BUN mg/dL 14  --  15 20   < > 42*   CREATININE mg/dL 0.81  --  0.82 0.95   < > 1.85*   CALCIUM mg/dL 10.0  --  9.6 8.6   < > 9.2   BILIRUBIN mg/dL 0.5  --   --  0.3  --  0.2   ALK PHOS U/L 117  --   --  98  --  108   ALT (SGPT) U/L 24  --   --  21  --  20   AST (SGOT) U/L 35   --   --  44*  --  39   GLUCOSE mg/dL 113*  --  68 195*   < > 216*    < > = values in this interval not displayed.     Results from last 7 days   Lab Units 12/13/24  0822 12/13/24  0537 12/13/24  0438 12/13/24  0046 12/12/24  1444 12/12/24  0342   MAGNESIUM mg/dL  --   --  2.3  --  2.3 2.2   PHOSPHORUS mg/dL 3.2  --  3.6   < > 1.8* 2.1*   HEMOGLOBIN g/dL  --  13.2  --   --   --  11.8*   HEMATOCRIT %  --  38.2  --   --   --  33.2*    < > = values in this interval not displayed.       RD to follow up per protocol.    Electronically signed by:  Berkley Browne RD  12/13/24 12:38 EST

## 2024-12-13 NOTE — NURSING NOTE
Report called to PCU nurse Emily. Transported patient to room 2131, family at bedside. Chart sent with patient, no belongings at bedside to send with patient. Restrains, NG, 3PIV still in place.

## 2024-12-13 NOTE — CASE MANAGEMENT/SOCIAL WORK
Continued Stay Note  VICENTE Rowley     Patient Name: Colten Yanez Jr.  MRN: [id number]  Today's Date: 12/13/2024    Admit Date: 12/9/2024    Plan: From home alone, pending clinical course and PT/OT eval   Discharge Plan       Row Name 12/13/24 1456       Plan    Plan From home alone, pending clinical course and PT/OT eval    Plan Comments DC Barriers: PT/OT eval pending, CIWA protocol, NG TF, NPO, ext cath, IV Abxs, restraints, DM educator following.                 Expected Discharge Date and Time       Expected Discharge Date Expected Discharge Time    Dec 16, 2024             FABIENNE Louis RN  ICU/CVU   O: [phone]  C: [phone]  [email or web address]

## 2024-12-13 NOTE — PROGRESS NOTES
[facility] MEDICINE SERVICE  DAILY PROGRESS NOTE    NAME: Colten Yanez Jr.  : 1965  MRN: [id number]      LOS: 4 days     PROVIDER OF SERVICE: Summer Yost MD    Chief Complaint: DKA (diabetic ketoacidosis)    Subjective:     Interval History:  History taken from: patient    No new complaint    Review of Systems:   Review of Systems   All other systems reviewed and are negative.      Objective:     Vital Signs  Temp:  [97.6 °F (36.4 °C)-98.3 °F (36.8 °C)] 98.3 °F (36.8 °C)  Heart Rate:  [] 81  Resp:  [12-17] 12  BP: (103-183)/() 151/85   Body mass index is 27.22 kg/m².    Physical Exam  Physical Exam  Constitutional:       Appearance: Normal appearance.   HENT:      Head: Normocephalic and atraumatic.      Nose: Nose normal.      Mouth/Throat:      Mouth: Mucous membranes are moist.   Eyes:      Extraocular Movements: Extraocular movements intact.      Conjunctiva/sclera: Conjunctivae normal.      Pupils: Pupils are equal, round, and reactive to light.   Cardiovascular:      Rate and Rhythm: Normal rate and regular rhythm.      Pulses: Normal pulses.      Heart sounds: Normal heart sounds.   Pulmonary:      Effort: Pulmonary effort is normal.      Breath sounds: Normal breath sounds.   Abdominal:      General: Abdomen is flat. Bowel sounds are normal.      Palpations: Abdomen is soft.   Musculoskeletal:         General: Normal range of motion.      Cervical back: Normal range of motion and neck supple.   Skin:     General: Skin is warm and dry.      Capillary Refill: Capillary refill takes less than 2 seconds.   Neurological:      Mental Status: He is alert.      Comments: Slightly lethargic, slow to respond to questions, confused however follows commands         Current Medications:  Scheduled Meds:aspirin, 81 mg, Nasogastric, Daily  atorvastatin, 10 mg, Nasogastric, Daily  carvedilol, 3.125 mg, Nasogastric, BID With Meals  cefTRIAXone, 2,000 mg, Intravenous, Q24H  folic acid, 1  mg, Nasogastric, Daily  heparin (porcine), 5,000 Units, Subcutaneous, Q8H  insulin glargine, 35 Units, Subcutaneous, Daily  insulin lispro, 3-14 Units, Subcutaneous, Q6H  mupirocin, 1 Application, Each Nare, BID  [START ON 12/14/2024] PHENobarbital, 32.4 mg, Nasogastric, Once  senna-docusate sodium, 2 tablet, Nasogastric, BID   And  polyethylene glycol, 17 g, Nasogastric, Daily  sodium chloride, 10 mL, Intravenous, Q12H  thiamine (B-1) IV, 200 mg, Intravenous, Q8H   Followed by  [START ON 12/15/2024] thiamine, 100 mg, Nasogastric, Daily      Continuous Infusions:   PRN Meds:.  acetaminophen    senna-docusate sodium **AND** polyethylene glycol **AND** bisacodyl **AND** bisacodyl    Calcium Replacement - Follow Nurse / BPA Driven Protocol    dextrose    dextrose    glucagon (human recombinant)    labetalol    LORazepam **OR** LORazepam **OR** LORazepam **OR** LORazepam **OR** LORazepam **OR** LORazepam    Magnesium Standard Dose Replacement - Follow Nurse / BPA Driven Protocol    nitroglycerin    ondansetron ODT **OR** [DISCONTINUED] ondansetron    Phosphorus Replacement - Follow Nurse / BPA Driven Protocol    Potassium Replacement - Follow Nurse / BPA Driven Protocol    sodium chloride       Diagnostic Data    Results from last 7 days   Lab Units 12/13/24  0537 12/13/24  0438   WBC 10*3/mm3 6.04  --    HEMOGLOBIN g/dL 13.2  --    HEMATOCRIT % 38.2  --    PLATELETS 10*3/mm3 153  --    GLUCOSE mg/dL  --  113*   CREATININE mg/dL  --  0.81   BUN mg/dL  --  14   SODIUM mmol/L  --  142   POTASSIUM mmol/L  --  3.9   AST (SGOT) U/L  --  35   ALT (SGPT) U/L  --  24   ALK PHOS U/L  --  117   BILIRUBIN mg/dL  --  0.5   ANION GAP mmol/L  --  10.5       CT Abdomen Pelvis Stone Protocol    Result Date: 12/12/2024  Impression: 1.Examination is limited due to lack of IV contrast administration. 2.No acute abnormality identified within the abdomen or pelvis. 3.No hydronephrosis or urinary tract calculi identified. 4.Mild air within  the bladder and likely within the prostatic urethra. This may be related to recent instrumentation. Please correlate with urinalysis to exclude urinary tract infection. 5.Additional findings as detailed above. Electronically Signed: Ghanshyam Muller MD  12/12/2024 11:30 AM EST  Workstation ID: [id number]    CT Head Without Contrast    Result Date: 12/12/2024  Impression: 1. No acute intracranial process. 2. Left maxillary sinusitis Electronically Signed: Cecil Swan  12/12/2024 11:25 AM EST  Workstation ID: [id number]       I reviewed the patient's new clinical results.    Assessment/Plan:     Active and Resolved Problems  Active Hospital Problems    Diagnosis  POA    **DKA (diabetic ketoacidosis) [E11.10]  Yes    Dyslipidemia [E78.5]  Yes    Vitamin D deficiency [E55.9]  Yes    Status post coronary artery stent placement [Z95.5]  Not Applicable    Type 1 diabetes mellitus with hyperglycemia [E10.65]  Yes      Resolved Hospital Problems   No resolved problems to display.       DKA  - Resolved.  Now on subcutaneous Lantus and lispro.  Continue current insulin regimen monitor blood glucose trend.  Hemoglobin A1c is 13.1%.    Septic shock due to Klebsiella oxytoca bacteremia, rhinovirus infection  - Shock is resolved.  Off pressors.  Continue ceftriaxone for treatment of Klebsiella bacteremia.  Supportive care for rhinovirus infection.  Currently on room air with an oxygen saturation of 97%.    Alcohol withdrawal with encephalopathy??  Suspected delirium tremens  Alcohol abuse history  - Continue CIWA protocol, currently on the phenobarbital taper, continue thiamine and folic acid.  CT head showed no acute intracranial process.  Sedation from phenobarbital may be contributing tot lethargy.    Acute kidney injury  - Resolved    Hypertension  - BP currently controlled.  Continue current treatment regimen.    Hyperlipidemia  - Continue statin therapy.      VTE Prophylaxis:  Pharmacologic & mechanical VTE prophylaxis orders  are present.      Disposition Planning:     Barriers to Discharge: Pending clinical improvement  Anticipated Date of Discharge: 12/17/2024  Place of Discharge: Home versus [facility]         Code Status and Medical Interventions: CPR (Attempt to Resuscitate); Full Support   Ordered at: 12/09/24 2111     Code Status (Patient has no pulse and is not breathing):    CPR (Attempt to Resuscitate)     Medical Interventions (Patient has pulse or is breathing):    Full Support       Signature: Electronically signed by Summer Yost MD, 12/13/24, 13:59 EST.  [facility] Hospitalist Team

## 2024-12-13 NOTE — PLAN OF CARE
Goal Outcome Evaluation:              Outcome Evaluation: Transfer from the ICU. Pt is in restraints due to pulling lines. Pt is alert to voice and oriented to self and time. Pt is NPO, but does not understand. Family is always at bedside. At this moment pts mother is with him and attentive. I did need to give pt PRN ativan for anxiety. Pt has been crying and calling out since he got on the floor. Pt is on CIWA. As of now, no tremors or hallucinations.

## 2024-12-14 LAB
ALBUMIN SERPL-MCNC: 3.2 G/DL (ref 3.5–5.2)
ALBUMIN/GLOB SERPL: 1.2 G/DL
ALP SERPL-CCNC: 101 U/L (ref 39–117)
ALT SERPL W P-5'-P-CCNC: 19 U/L (ref 1–41)
ANION GAP SERPL CALCULATED.3IONS-SCNC: 7.1 MMOL/L (ref 5–15)
AST SERPL-CCNC: 30 U/L (ref 1–40)
BACTERIA SPEC AEROBE CULT: NORMAL
BASOPHILS # BLD AUTO: 0.02 10*3/MM3 (ref 0–0.2)
BASOPHILS NFR BLD AUTO: 0.3 % (ref 0–1.5)
BILIRUB SERPL-MCNC: 0.3 MG/DL (ref 0–1.2)
BUN SERPL-MCNC: 17 MG/DL (ref 6–20)
BUN/CREAT SERPL: 18.1 (ref 7–25)
CALCIUM SPEC-SCNC: 9.4 MG/DL (ref 8.6–10.5)
CHLORIDE SERPL-SCNC: 99 MMOL/L (ref 98–107)
CO2 SERPL-SCNC: 26.9 MMOL/L (ref 22–29)
CREAT SERPL-MCNC: 0.94 MG/DL (ref 0.76–1.27)
DEPRECATED RDW RBC AUTO: 46.8 FL (ref 37–54)
EGFRCR SERPLBLD CKD-EPI 2021: 94.6 ML/MIN/1.73
EOSINOPHIL # BLD AUTO: 0.04 10*3/MM3 (ref 0–0.4)
EOSINOPHIL NFR BLD AUTO: 0.7 % (ref 0.3–6.2)
ERYTHROCYTE [DISTWIDTH] IN BLOOD BY AUTOMATED COUNT: 13.1 % (ref 12.3–15.4)
GLOBULIN UR ELPH-MCNC: 2.6 GM/DL
GLUCOSE BLDC GLUCOMTR-MCNC: 137 MG/DL (ref 70–105)
GLUCOSE BLDC GLUCOMTR-MCNC: 221 MG/DL (ref 70–105)
GLUCOSE BLDC GLUCOMTR-MCNC: 237 MG/DL (ref 70–105)
GLUCOSE BLDC GLUCOMTR-MCNC: 270 MG/DL (ref 70–105)
GLUCOSE BLDC GLUCOMTR-MCNC: 316 MG/DL (ref 70–105)
GLUCOSE BLDC GLUCOMTR-MCNC: 341 MG/DL (ref 70–105)
GLUCOSE BLDC GLUCOMTR-MCNC: 63 MG/DL (ref 70–105)
GLUCOSE SERPL-MCNC: 298 MG/DL (ref 65–99)
HCT VFR BLD AUTO: 35.9 % (ref 37.5–51)
HGB BLD-MCNC: 12 G/DL (ref 13–17.7)
IMM GRANULOCYTES # BLD AUTO: 0.02 10*3/MM3 (ref 0–0.05)
IMM GRANULOCYTES NFR BLD AUTO: 0.3 % (ref 0–0.5)
LYMPHOCYTES # BLD AUTO: 2 10*3/MM3 (ref 0.7–3.1)
LYMPHOCYTES NFR BLD AUTO: 33.6 % (ref 19.6–45.3)
MAGNESIUM SERPL-MCNC: 2.1 MG/DL (ref 1.6–2.6)
MCH RBC QN AUTO: 32.2 PG (ref 26.6–33)
MCHC RBC AUTO-ENTMCNC: 33.4 G/DL (ref 31.5–35.7)
MCV RBC AUTO: 96.2 FL (ref 79–97)
MONOCYTES # BLD AUTO: 0.61 10*3/MM3 (ref 0.1–0.9)
MONOCYTES NFR BLD AUTO: 10.2 % (ref 5–12)
NEUTROPHILS NFR BLD AUTO: 3.27 10*3/MM3 (ref 1.7–7)
NEUTROPHILS NFR BLD AUTO: 54.9 % (ref 42.7–76)
NRBC BLD AUTO-RTO: 0 /100 WBC (ref 0–0.2)
PHOSPHATE SERPL-MCNC: 2.9 MG/DL (ref 2.5–4.5)
PLATELET # BLD AUTO: 169 10*3/MM3 (ref 140–450)
PMV BLD AUTO: 10.7 FL (ref 6–12)
POTASSIUM SERPL-SCNC: 4.9 MMOL/L (ref 3.5–5.2)
PROT SERPL-MCNC: 5.8 G/DL (ref 6–8.5)
QT INTERVAL: 412 MS
QTC INTERVAL: 438 MS
RBC # BLD AUTO: 3.73 10*6/MM3 (ref 4.14–5.8)
SODIUM SERPL-SCNC: 133 MMOL/L (ref 136–145)
WBC NRBC COR # BLD AUTO: 5.96 10*3/MM3 (ref 3.4–10.8)

## 2024-12-14 PROCEDURE — 84100 ASSAY OF PHOSPHORUS: CPT | Performed by: NURSE PRACTITIONER

## 2024-12-14 PROCEDURE — 87040 BLOOD CULTURE FOR BACTERIA: CPT | Performed by: STUDENT IN AN ORGANIZED HEALTH CARE EDUCATION/TRAINING PROGRAM

## 2024-12-14 PROCEDURE — 92610 EVALUATE SWALLOWING FUNCTION: CPT

## 2024-12-14 PROCEDURE — 25810000003 LACTATED RINGERS PER 1000 ML: Performed by: STUDENT IN AN ORGANIZED HEALTH CARE EDUCATION/TRAINING PROGRAM

## 2024-12-14 PROCEDURE — 63710000001 INSULIN LISPRO (HUMAN) PER 5 UNITS: Performed by: STUDENT IN AN ORGANIZED HEALTH CARE EDUCATION/TRAINING PROGRAM

## 2024-12-14 PROCEDURE — 25010000002 HEPARIN (PORCINE) PER 1000 UNITS: Performed by: EMERGENCY MEDICINE

## 2024-12-14 PROCEDURE — 80053 COMPREHEN METABOLIC PANEL: CPT | Performed by: NURSE PRACTITIONER

## 2024-12-14 PROCEDURE — 25010000002 THIAMINE HCL 200 MG/2ML SOLUTION: Performed by: NURSE PRACTITIONER

## 2024-12-14 PROCEDURE — 82948 REAGENT STRIP/BLOOD GLUCOSE: CPT | Performed by: NURSE PRACTITIONER

## 2024-12-14 PROCEDURE — 25010000002 CEFTRIAXONE PER 250 MG: Performed by: EMERGENCY MEDICINE

## 2024-12-14 PROCEDURE — 63710000001 INSULIN LISPRO (HUMAN) PER 5 UNITS: Performed by: NURSE PRACTITIONER

## 2024-12-14 PROCEDURE — 83735 ASSAY OF MAGNESIUM: CPT | Performed by: NURSE PRACTITIONER

## 2024-12-14 PROCEDURE — 85025 COMPLETE CBC W/AUTO DIFF WBC: CPT | Performed by: NURSE PRACTITIONER

## 2024-12-14 PROCEDURE — 82948 REAGENT STRIP/BLOOD GLUCOSE: CPT

## 2024-12-14 RX ORDER — INSULIN LISPRO 100 [IU]/ML
3-14 INJECTION, SOLUTION INTRAVENOUS; SUBCUTANEOUS
Status: DISCONTINUED | OUTPATIENT
Start: 2024-12-14 | End: 2024-12-16

## 2024-12-14 RX ORDER — SODIUM CHLORIDE, SODIUM LACTATE, POTASSIUM CHLORIDE, CALCIUM CHLORIDE 600; 310; 30; 20 MG/100ML; MG/100ML; MG/100ML; MG/100ML
75 INJECTION, SOLUTION INTRAVENOUS CONTINUOUS
Status: DISCONTINUED | OUTPATIENT
Start: 2024-12-14 | End: 2024-12-15

## 2024-12-14 RX ORDER — HYDROXYZINE HYDROCHLORIDE 25 MG/1
25 TABLET, FILM COATED ORAL 3 TIMES DAILY PRN
Status: DISCONTINUED | OUTPATIENT
Start: 2024-12-14 | End: 2024-12-16 | Stop reason: HOSPADM

## 2024-12-14 RX ADMIN — MUPIROCIN 1 APPLICATION: 20 OINTMENT TOPICAL at 08:49

## 2024-12-14 RX ADMIN — HEPARIN SODIUM 5000 UNITS: 5000 INJECTION INTRAVENOUS; SUBCUTANEOUS at 13:07

## 2024-12-14 RX ADMIN — ATORVASTATIN CALCIUM 10 MG: 10 TABLET ORAL at 08:49

## 2024-12-14 RX ADMIN — CARVEDILOL 3.12 MG: 3.12 TABLET, FILM COATED ORAL at 08:49

## 2024-12-14 RX ADMIN — HYDROXYZINE HYDROCHLORIDE 25 MG: 25 TABLET, FILM COATED ORAL at 11:55

## 2024-12-14 RX ADMIN — HEPARIN SODIUM 5000 UNITS: 5000 INJECTION INTRAVENOUS; SUBCUTANEOUS at 21:21

## 2024-12-14 RX ADMIN — ASPIRIN 81 MG CHEWABLE TABLET 81 MG: 81 TABLET CHEWABLE at 08:49

## 2024-12-14 RX ADMIN — SENNOSIDES AND DOCUSATE SODIUM 2 TABLET: 50; 8.6 TABLET ORAL at 08:49

## 2024-12-14 RX ADMIN — SENNOSIDES AND DOCUSATE SODIUM 2 TABLET: 50; 8.6 TABLET ORAL at 21:21

## 2024-12-14 RX ADMIN — THIAMINE HYDROCHLORIDE 200 MG: 100 INJECTION, SOLUTION INTRAMUSCULAR; INTRAVENOUS at 05:54

## 2024-12-14 RX ADMIN — CARVEDILOL 3.12 MG: 3.12 TABLET, FILM COATED ORAL at 17:12

## 2024-12-14 RX ADMIN — CEFTRIAXONE 2000 MG: 2 INJECTION, POWDER, FOR SOLUTION INTRAMUSCULAR; INTRAVENOUS at 11:55

## 2024-12-14 RX ADMIN — THIAMINE HYDROCHLORIDE 200 MG: 100 INJECTION, SOLUTION INTRAMUSCULAR; INTRAVENOUS at 13:07

## 2024-12-14 RX ADMIN — PHENOBARBITAL 32.4 MG: 32.4 TABLET ORAL at 04:13

## 2024-12-14 RX ADMIN — HEPARIN SODIUM 5000 UNITS: 5000 INJECTION INTRAVENOUS; SUBCUTANEOUS at 05:54

## 2024-12-14 RX ADMIN — DEXTROSE 15 G: 15 GEL ORAL at 21:13

## 2024-12-14 RX ADMIN — INSULIN LISPRO 5 UNITS: 100 INJECTION, SOLUTION INTRAVENOUS; SUBCUTANEOUS at 12:35

## 2024-12-14 RX ADMIN — INSULIN GLARGINE-YFGN 35 UNITS: 100 INJECTION, SOLUTION SUBCUTANEOUS at 08:48

## 2024-12-14 RX ADMIN — INSULIN LISPRO 5 UNITS: 100 INJECTION, SOLUTION INTRAVENOUS; SUBCUTANEOUS at 00:54

## 2024-12-14 RX ADMIN — POLYETHYLENE GLYCOL 3350 17 G: 17 POWDER, FOR SOLUTION ORAL at 08:49

## 2024-12-14 RX ADMIN — SODIUM CHLORIDE, POTASSIUM CHLORIDE, SODIUM LACTATE AND CALCIUM CHLORIDE 75 ML/HR: 600; 310; 30; 20 INJECTION, SOLUTION INTRAVENOUS at 18:28

## 2024-12-14 RX ADMIN — Medication 10 ML: at 21:22

## 2024-12-14 RX ADMIN — THIAMINE HYDROCHLORIDE 200 MG: 100 INJECTION, SOLUTION INTRAMUSCULAR; INTRAVENOUS at 21:56

## 2024-12-14 RX ADMIN — Medication 10 ML: at 08:51

## 2024-12-14 RX ADMIN — FOLIC ACID 1 MG: 1 TABLET ORAL at 08:49

## 2024-12-14 RX ADMIN — INSULIN LISPRO 5 UNITS: 100 INJECTION, SOLUTION INTRAVENOUS; SUBCUTANEOUS at 17:12

## 2024-12-14 NOTE — PLAN OF CARE
Goal Outcome Evaluation:      Patient was seen for dysphagia evaluation per failed swallow screen. Patient reports no  history of CVA, pneumonia, esophageal stricture and/or GERD. CT head revealed no acute intracranial process. Patient has his own natural teeth. Oral mechanism examination revealed patient demonstrates full ROM and mobility of lingual and labial structures. Lingual and jaw strength was adequate. Palatal movement was present. Noted positive gag reflex. Properly positioned patient upright in bed near 90 degree hip flexion prior to trials. Provided trials of thins via straw x 9. Given digital palpation, swallow was timely. No overt s/s of aspiration was observed. No wet vocal quality was detected. Provided trials of puree x 3. Oral transit was 3-4 seconds in duration. No clearing of throat, cough and/or vocal changes were identified. Patient completely cleared oral cavity effectively without evidence of oral residue. Provided trials of mixed consistency x 3, STC x 2 and regular x 2. Patient displayed adequate rotary chewing. Noted adequate bolus control and manipulation.  Patient completely cleared oral cavity without oral residue particularly with mixed consistency but with drier items patient required liquid wash to aid in oral clearing. Noted coughing x 1 on regular with crumb of the cracker given liquid wash. Otherwise, no other  overt s/s of aspiration was evident. Vocal quality remained clear throughout. Patient displayed functional oral and pharyngeal swallow. However, noted large bites at times.  It is recommended that patient receive a regular diet at this time. ST will follow to assure safety and adequacy of diet and independent use of safe swallow strategies.                        SLP Swallowing Diagnosis: functional oral phase, functional pharyngeal phase (12/14/24 1300)

## 2024-12-14 NOTE — PLAN OF CARE
Goal Outcome Evaluation:  Plan of Care Reviewed With: patient        Progress: improving     Pt is more oriented, calm and cooperative. Still on restraints, wife requested not to take it off through the night while he is sleeping because he might pull his tube again. Vitally stable, on CIWA, with score not more than 10. Maintained on fsll protocol, daughter is at bedside.

## 2024-12-14 NOTE — PLAN OF CARE
Problem: Restraint, Nonviolent  Goal: Absence of Harm or Injury  12/14/2024 1841 by Jensen Torres, RN  Outcome: Progressing  12/14/2024 1546 by Jensen Torres, RN  Outcome: Progressing  Intervention: Implement Least Restrictive Safety Strategies  Flowsheets  Taken 12/14/2024 1600  Diversional Activities: television  Taken 12/14/2024 1200  Diversional Activities: television  Taken 12/14/2024 1000  Medical Device Protection: IV pole/bag removed from visual field  Diversional Activities: television  Taken 12/14/2024 0800  Medical Device Protection: IV pole/bag removed from visual field  Diversional Activities: television  Intervention: Protect Dignity, Rights and Personal Wellbeing  Flowsheets  Taken 12/14/2024 1600  Trust Relationship/Rapport:   care explained   questions answered  Taken 12/14/2024 1200  Trust Relationship/Rapport:   care explained   questions answered   questions encouraged  Taken 12/14/2024 0800  Trust Relationship/Rapport:   care explained   questions answered   questions encouraged  Intervention: Protect Skin and Joint Integrity  Flowsheets  Taken 12/14/2024 1600  Body Position: position changed independently  Skin Protection: incontinence pads utilized  Range of Motion: active ROM (range of motion) encouraged  Taken 12/14/2024 1400  Body Position: position changed independently  Taken 12/14/2024 1200  Body Position: position changed independently  Skin Protection: incontinence pads utilized  Range of Motion: active ROM (range of motion) encouraged  Taken 12/14/2024 1000  Body Position: right  Taken 12/14/2024 0800  Body Position: left  Skin Protection: incontinence pads utilized  Range of Motion: ROM (range of motion) performed   Goal Outcome Evaluation:

## 2024-12-14 NOTE — PROGRESS NOTES
[facility] MEDICINE SERVICE  DAILY PROGRESS NOTE    NAME: Colten Yanez Jr.  : 1965  MRN: [id number]      LOS: 5 days     PROVIDER OF SERVICE: Ronaldo Parisi MD    Chief Complaint: DKA (diabetic ketoacidosis)    Subjective:     Interval History:  History taken from: patient    No new complaint    Review of Systems:   Review of Systems   All other systems reviewed and are negative.      Objective:     Vital Signs  Temp:  [97.7 °F (36.5 °C)-98.2 °F (36.8 °C)] 97.8 °F (36.6 °C)  Heart Rate:  [82-92] 84  Resp:  [12-22] 12  BP: ()/(61-87) 106/61  Flow (L/min) (Oxygen Therapy):  [4] 4   Body mass index is 27.65 kg/m².    Physical Exam  Physical Exam  Constitutional:       Appearance: Normal appearance.   HENT:      Head: Normocephalic and atraumatic.      Nose: Nose normal.      Mouth/Throat:      Mouth: Mucous membranes are moist.   Eyes:      Extraocular Movements: Extraocular movements intact.      Conjunctiva/sclera: Conjunctivae normal.      Pupils: Pupils are equal, round, and reactive to light.   Cardiovascular:      Rate and Rhythm: Normal rate and regular rhythm.      Pulses: Normal pulses.      Heart sounds: Normal heart sounds.   Pulmonary:      Effort: Pulmonary effort is normal.      Breath sounds: Normal breath sounds.   Abdominal:      General: Abdomen is flat. Bowel sounds are normal.      Palpations: Abdomen is soft.   Musculoskeletal:         General: Normal range of motion.      Cervical back: Normal range of motion and neck supple.   Skin:     General: Skin is warm and dry.      Capillary Refill: Capillary refill takes less than 2 seconds.   Neurological:      Mental Status: He is alert.      Comments: Slightly lethargic, slow to respond to questions, confused however follows commands         Current Medications:  Scheduled Meds:aspirin, 81 mg, Nasogastric, Daily  atorvastatin, 10 mg, Nasogastric, Daily  carvedilol, 3.125 mg, Nasogastric, BID With Meals  cefTRIAXone, 2,000  mg, Intravenous, Q24H  folic acid, 1 mg, Nasogastric, Daily  heparin (porcine), 5,000 Units, Subcutaneous, Q8H  insulin glargine, 35 Units, Subcutaneous, Daily  insulin lispro, 3-14 Units, Subcutaneous, 4x Daily With Meals & Nightly  senna-docusate sodium, 2 tablet, Nasogastric, BID   And  polyethylene glycol, 17 g, Nasogastric, Daily  sodium chloride, 10 mL, Intravenous, Q12H  thiamine (B-1) IV, 200 mg, Intravenous, Q8H   Followed by  [START ON 12/15/2024] thiamine, 100 mg, Nasogastric, Daily      Continuous Infusions:   PRN Meds:.  acetaminophen    senna-docusate sodium **AND** polyethylene glycol **AND** bisacodyl **AND** bisacodyl    Calcium Replacement - Follow Nurse / BPA Driven Protocol    dextrose    dextrose    glucagon (human recombinant)    hydrOXYzine    labetalol    LORazepam **OR** LORazepam **OR** LORazepam **OR** LORazepam **OR** LORazepam **OR** LORazepam    Magnesium Standard Dose Replacement - Follow Nurse / BPA Driven Protocol    nitroglycerin    ondansetron ODT **OR** [DISCONTINUED] ondansetron    Phosphorus Replacement - Follow Nurse / BPA Driven Protocol    Potassium Replacement - Follow Nurse / BPA Driven Protocol    sodium chloride       Diagnostic Data    Results from last 7 days   Lab Units 12/14/24  0318   WBC 10*3/mm3 5.96   HEMOGLOBIN g/dL 12.0*   HEMATOCRIT % 35.9*   PLATELETS 10*3/mm3 169   GLUCOSE mg/dL 298*   CREATININE mg/dL 0.94   BUN mg/dL 17   SODIUM mmol/L 133*   POTASSIUM mmol/L 4.9   AST (SGOT) U/L 30   ALT (SGPT) U/L 19   ALK PHOS U/L 101   BILIRUBIN mg/dL 0.3   ANION GAP mmol/L 7.1       No radiology results for the last day      I reviewed the patient's new clinical results.    Assessment/Plan:   Colten aYnez Jr. is a 59 y.o. male with PMH of Diabetes mellitus type 1.5, dyslipidemia, vitamin D deficiency, CAD with history of stent placement presented to the hospital after being found down at home, and was admitted with a principal diagnosis of DKA (diabetic  ketoacidosis).  Per patient for HPI taken from chart review as patient is lethargic and very confused when he is awake.  Patient was found down by neighbors on his porch earlier this morning.  EMS was called and patient was unresponsive with a core temperature of 88.6.  Upon arrival to the emergency department he was placed on a Pedro hugger and a temperature sensing Dixon was placed.  Initially patient was unresponsive but his mental status improved with IV fluid resuscitation.  Lab work obtained was consistent with DKA and thus DKA protocol with insulin drip was initiated.  CT head was obtained and negative of any abnormality.  CXR was clear.  Lactic acid was elevated at 3.0 and patient had white count of 33.52.  Patient was given empiric antibiotics and blood cultures were obtained.  RVP was + rhinovirus.  Urinalysis was negative for UTI.  No clear bacterial source of infection could be identified.  Patient's mother at bedside states that he drinks probably 2 beers a day.  Patient awake but agitated trying to get up out of bed.  When asked if he drinks alcohol he says no.       Assessments:   Encephalopathy: multifactorial: sepsis/dka, now resolved  Sepsis likely secondary to Bacteremia: Klebsiella , possible source is UTI  Diabetic ketoacidosis - Resolved  Alcohol abuse history, per father Questionable only drinks socially  Acute kidney injury  Hypertension  Hyperlipidemia    Plan:   -Continue Ceftriaxone for bacteremia, CTAP: Mild air prostatic urethra. This may be related to recent instrumentation, however no hydronephrosis, he was in ICU prior  -Will consult ID in AM for abx given he may need go home with IV abx for bactremia, repeat Cultures pending  -DKA resolved, Continue lantus will increase to 40 U bedtime from 35,  continue, SSI, A1c 13  -Continue ASA, coreg, statins, home meds  -Continue Thiamine   -Check CBC/CMP , monitor agap in am   -AM labs will follow      VTE Prophylaxis:  Pharmacologic &  mechanical VTE prophylaxis orders are present.      Disposition Planning:     Barriers to Discharge: Pending clinical improvement  Anticipated Date of Discharge: 12/17/2024  Place of Discharge: Home versus [facility]         Code Status and Medical Interventions: CPR (Attempt to Resuscitate); Full Support   Ordered at: 12/09/24 2111     Code Status (Patient has no pulse and is not breathing):    CPR (Attempt to Resuscitate)     Medical Interventions (Patient has pulse or is breathing):    Full Support       Signature: Electronically signed by Ronaldo Parisi MD, 12/14/24, 17:15 EST.  [facility] Hospitalist Team

## 2024-12-14 NOTE — PROGRESS NOTES
Nutrition Services  Patient Name: Colten Yanez Jr.  YOB: 1965  MRN: [id number]  Admission date: 12/9/2024    PROGRESS NOTE      Nutrition Intervention Updates: Will continue to monitor for po diet tolerance and determine if pt can tolerate po intake to meet est needs.        Encounter Information: Check on for TF. Pt downgraded yesterday out of ICU. TF discontinued and po diet started today. NGT remains in place per EMR. No TF infusing. Pt remains confused and pulling at lines requiring restraints. RD will continue to monitor for po diet tolerance.        PO Diet: Diet: Cardiac, Diabetic; Healthy Heart (2-3 Na+); Consistent Carbohydrate; Fluid Consistency: Thin (IDDSI 0)   PO Supplements: None ordered    PO Intake:  No meals documented yet        Current nutrition support: TF discontinued 12/14   Nutrition support review: No TF infusing.       Labs (reviewed below): Hyponatremia-management per attending.         GI Function:  No documented BM since admission, pt is on a scheduled bowel regimen        Brief weight review   Wt Readings from Last 10 Encounters:   12/14/24 0515 82.5 kg (181 lb 14.1 oz)   12/13/24 1650 82 kg (180 lb 12.4 oz)   12/13/24 0423 83.6 kg (184 lb 4.9 oz)   12/12/24 0500 81 kg (178 lb 9.2 oz)   12/11/24 0514 81.9 kg (180 lb 8.9 oz)   12/09/24 1722 86.5 kg (190 lb 11.2 oz)   10/02/23 1038 86.5 kg (190 lb 12.8 oz)   05/02/22 1038 91.6 kg (202 lb)   11/01/21 1118 94.8 kg (209 lb)   05/05/21 0949 90.3 kg (199 lb)   11/02/20 1503 87.5 kg (193 lb)   05/04/20 1448 88.5 kg (195 lb)   04/20/20 1432 89.4 kg (197 lb)   10/30/19 1319 89.4 kg (197 lb)   10/14/19 1553 88.5 kg (195 lb)        Results from last 7 days   Lab Units 12/14/24  0318 12/13/24  1534 12/13/24  0438 12/12/24  1444 12/12/24  0342   SODIUM mmol/L 133* 137 142   < > 149*   POTASSIUM mmol/L 4.9 3.6 3.9   < > 3.2*   CHLORIDE mmol/L 99 102 104   < > 111*   CO2 mmol/L 26.9 28.5 27.5   < > 26.5   BUN mg/dL 17 14 14   < > 20    CREATININE mg/dL 0.94 0.75* 0.81   < > 0.95   CALCIUM mg/dL 9.4 9.5 10.0   < > 8.6   BILIRUBIN mg/dL 0.3  --  0.5  --  0.3   ALK PHOS U/L 101  --  117  --  98   ALT (SGPT) U/L 19  --  24  --  21   AST (SGOT) U/L 30  --  35  --  44*   GLUCOSE mg/dL 298* 69 113*   < > 195*    < > = values in this interval not displayed.     Results from last 7 days   Lab Units 12/14/24  0318 12/13/24  1534 12/13/24  0537 12/13/24  0438   MAGNESIUM mg/dL 2.1 2.0  --  2.3   PHOSPHORUS mg/dL 2.9  --    < > 3.6   HEMOGLOBIN g/dL 12.0*  --    < >  --    HEMATOCRIT % 35.9*  --    < >  --     < > = values in this interval not displayed.       RD to follow up per protocol.    Electronically signed by:  Eloisa Clifton RD  12/14/24 13:46 EST

## 2024-12-14 NOTE — THERAPY EVALUATION
Acute Care - Speech Language Pathology   Swallow Initial Evaluation  [facility]     Patient Name: Colten Yanez Jr.  : 1965  MRN: [id number]  Today's Date: 2024               Admit Date: 2024    Visit Dx:     ICD-10-CM ICD-9-CM   1. Diabetic ketoacidosis with coma associated with type 1 diabetes mellitus  E10.11 250.33   2. Hypothermia, initial encounter  T68.XXXA 991.6   3. Leukocytosis, unspecified type  D72.829 288.60   4. 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    Vitamin D deficiency    Status post angioplasty with stent    Dyslipidemia    DKA (diabetic ketoacidosis)     Past Medical History:   Diagnosis Date    Asthma     Coronary artery disease     Diabetes 1.5, managed as type 1     Hyperlipidemia      Past Surgical History:   Procedure Laterality Date    APPENDECTOMY      CARDIAC CATHETERIZATION      CORONARY ANGIOPLASTY WITH STENT PLACEMENT      SINUS SURGERY      TONSILLECTOMY         SLP Recommendation and Plan  SLP Swallowing Diagnosis: functional oral phase, functional pharyngeal phase (24)  SLP Diet Recommendation: regular textures, thin liquids (24)  Recommended Precautions and Strategies: upright posture during/after eating, small bites of food and sips of liquid, multiple swallows per bite of food, multiple swallows per sip of liquid, alternate between small bites of food and sips of liquid, general aspiration precautions (24)  SLP Rec. for Method of Medication Administration: meds whole, meds crushed, as tolerated (24)     Monitor for Signs of Aspiration: yes, notify SLP if any concerns (24)     Swallow  Criteria for Skilled Therapeutic Interventions Met: demonstrates skilled criteria (12/14/24 1300)     Rehab Potential/Prognosis, Swallowing: good, to achieve stated therapy goals (12/14/24 1300)  Therapy Frequency (Swallow): PRN (12/14/24 1300)     Oral Care Recommendations: Oral Care BID/PRN, Swab (12/14/24 1300)     SWALLOW EVALUATION (Last 72 Hours)       SLP Adult Swallow Evaluation       Row Name 12/14/24 1300       Rehab Evaluation    Document Type evaluation  -CB    Subjective Information no complaints  -CB    Patient Observations alert;cooperative  -CB    Patient/Family/Caregiver Comments/Observations Patient able to follow simple directives. Patient's family and/or friend  was present during assessment.  -CB    Patient Effort good  -CB       General Information    Patient Profile Reviewed yes  -CB    Pertinent History Of Current Problem Colten Yanez Jr. is a 59 y.o. male with PMH of Diabetes mellitus type 1.5, dyslipidemia, vitamin D deficiency, CAD with history of stent placement presented to the hospital after being found down at home, and was admitted with a principal diagnosis of DKA (diabetic ketoacidosis).  Per patient for HPI taken from chart review as patient is lethargic and very confused when he is awake.  Patient was found down by neighbors on his porch earlier this morning.  EMS was called and patient was unresponsive with a core temperature of 88.6.  Upon arrival to the emergency department he was placed on a Pedro hugger and a temperature sensing Dixon was placed.  Initially patient was unresponsive but his mental status improved with IV fluid resuscitation.  Lab work obtained was consistent with DKA and thus DKA protocol with insulin drip was initiated.  CT head was obtained and negative of any abnormality.  CXR was clear.  Lactic acid was elevated at 3.0 and patient had white count of 33.52.  Patient was given empiric antibiotics and blood cultures were obtained.  RVP was + rhinovirus.   "Urinalysis was negative for UTI.  No clear bacterial source of infection could be identified.  Patient's mother at bedside states that he drinks probably 2 beers a day.  Patient awake but agitated trying to get up out of bed.  When asked if he drinks alcohol he says no.     Sepsis workup has revealed Klebsiella oxytoca bacteremia, antimicrobials were adjusted.       ACP: Patient does not have advanced directive on file at this facility.  Patient unable to participate in HPI due to confusion.  Patient's mother at bedside states he is a full code.\"     I have noted the following changes since admission: Patient no longer on insulin gtt, now receiving lantus and correctional humalog at moderate-high dosing. Mental status remains altered, likely secondary to alcohol withdrawal, so patient is receiving phenobarbital, thiamine, folic acid. CT head and CT abdomen/pelvis for stone protocol pending. Patient receiving potassium replacement per protocol, potassium 3.2 this morning, improved from 2.8 yesterday. Patient currently has NGT and is receiving tube feedings. Free water was increased today for hypernatremia, sodium 149 this morning. Due to improvement in status, patient deemed appropriate for downgrade from ICU to PCU.  -CB    Current Method of Nutrition NPO  -CB       Pain    Pretreatment Pain Rating 0/10 - no pain  -CB    Posttreatment Pain Rating 0/10 - no pain  -CB       Oral Motor Structure and Function    Dentition Assessment natural, present and adequate  -CB    Secretion Management WNL/WFL  -CB    Mucosal Quality moist, healthy  -CB    Gag Response WFL  -CB       Oral Musculature and Cranial Nerve Assessment    Oral Motor General Assessment WFL  -CB    Oral Motor, Comment Oral mechanism examination revealed patient demonstrated full ROM and mobility of lingual and labial structures. Lingual and jaw strength was adequate. Palatal movement was present. Noted positive gag reflex.  -CB       General " Eating/Swallowing Observations    Respiratory Support Currently in Use room air  -CB    Eating/Swallowing Skills self-fed;fed by SLP  -CB    Positioning During Eating upright in bed  -CB    Utensils Used spoon;straw  -CB    Consistencies Trialed regular textures;soft to chew textures;mixed consistency;pureed;thin liquids  -CB       Clinical Swallow Eval    Clinical Swallow Evaluation Summary Patient was seen for dysphagia evaluation per failed swallow screen. Patient reports no  history of CVA, pneumonia, esophageal stricture and/or GERD. CT head revealed no acute intracranial process. Patient has his own natural teeth. Oral mechanism examination revealed patient demonstrates full ROM and mobility of lingual and labial structures. Lingual and jaw strength was adequate. Palatal movement was present. Noted positive gag reflex. Properly positioned patient upright in bed near 90 degree hip flexion prior to trials. Provided trials of thins via straw x 9. Given digital palpation, swallow was timely. No overt s/s of aspiration was observed. No wet vocal quality was detected. Provided trials of puree x 3. Oral transit was 3-4 seconds in duration. No clearing of throat, cough and/or vocal changes were identified. Patient completely cleared oral cavity effectively without evidence of oral residue. Provided trials of mixed consistency x 3, STC x 2 and regular x 2. Patient displayed adequate rotary chewing. Noted adequate bolus control and manipulation.  Patient completely cleared oral cavity without oral residue particularly with mixed consistency but with drier items patient required liquid wash to aid in oral clearing. Noted coughing x 1 on regular with crumb of the cracker given liquid wash. Otherwise, no other  overt s/s of aspiration was evident. Vocal quality remained clear throughout. It is recommended that patient receive a regular diet at this time. ST will follow to assure safety and adequacy of diet and independent  use of safe swallow strategies.  -CB       SLP Evaluation Clinical Impression    SLP Swallowing Diagnosis functional oral phase;functional pharyngeal phase  -CB    Functional Impact risk of aspiration/pneumonia  primarily d/t impulsivity with large bites.  -CB    Rehab Potential/Prognosis, Swallowing good, to achieve stated therapy goals  -CB    Swallow Criteria for Skilled Therapeutic Interventions Met demonstrates skilled criteria  -CB       Recommendations    Therapy Frequency (Swallow) PRN  -CB    SLP Diet Recommendation regular textures;thin liquids  -CB    Recommended Precautions and Strategies upright posture during/after eating;small bites of food and sips of liquid;multiple swallows per bite of food;multiple swallows per sip of liquid;alternate between small bites of food and sips of liquid;general aspiration precautions  -CB    Oral Care Recommendations Oral Care BID/PRN;Swab  -CB    SLP Rec. for Method of Medication Administration meds whole;meds crushed;as tolerated  -CB    Monitor for Signs of Aspiration yes;notify SLP if any concerns  -CB       Swallow Goals (SLP)    Swallow LTGs Swallow Long Term Goal (free text)  -CB    Swallow STGs diet tolerance goal selection (SLP)  -CB    Diet Tolerance Goal Selection (SLP) Swallow Short Term Goal 1  -CB       (LTG) Swallow    (LTG) Swallow Patient will tolerate safest and least restrictive diet without complication from aspiration.  -CB    Time Frame (Swallow Long Term Goal) by discharge  -CB    Progress/Outcomes (Swallow Long Term Goal) new goal  -CB       (STG) Swallow 1    (STG) Swallow 1 Patient will participate in full meal assessment to assure safety and adequacy of recommended diet and independent use of safe swallow strategies.  -CB    Time Frame (Swallow Short Term Goal 1) 1 week  -CB    Progress/Outcomes (Swallow Short Term Goal 1) new goal  -CB              User Key  (r) = Recorded By, (t) = Taken By, (c) = Cosigned By      Initials Name Effective  Dates    Dayana Riley SLP 09/21/21 -                     EDUCATION  The patient has been educated in the following areas:   Dysphagia (Swallowing Impairment) Oral Care/Hydration.        SLP GOALS       Row Name 12/14/24 1300       (LTG) Swallow    (LTG) Swallow Patient will tolerate safest and least restrictive diet without complication from aspiration.  -CB    Time Frame (Swallow Long Term Goal) by discharge  -CB    Progress/Outcomes (Swallow Long Term Goal) new goal  -CB       (STG) Swallow 1    (STG) Swallow 1 Patient will participate in full meal assessment to assure safety and adequacy of recommended diet and independent use of safe swallow strategies.  -CB    Time Frame (Swallow Short Term Goal 1) 1 week  -CB    Progress/Outcomes (Swallow Short Term Goal 1) new goal  -CB              User Key  (r) = Recorded By, (t) = Taken By, (c) = Cosigned By      Initials Name Provider Type    Dayana Riley SLP Speech and Language Pathologist                         Time Calculation:                LILIAN Carrero  12/14/2024

## 2024-12-14 NOTE — PLAN OF CARE
Goal Outcome Evaluation:      Patient became alert and oriented this shift. Restraints discharged. Speech consulted to evaluate swallowing and passed eval. Heart healthy consistent carb diet started on patient. Patient very unsteady when transferring to bedside commode. Plan of care on going.

## 2024-12-15 LAB
AMPHET+METHAMPHET UR QL: NEGATIVE
AMPHETAMINES UR QL: NEGATIVE
ANION GAP SERPL CALCULATED.3IONS-SCNC: 7.2 MMOL/L (ref 5–15)
BARBITURATES UR QL SCN: POSITIVE
BASOPHILS # BLD AUTO: 0.03 10*3/MM3 (ref 0–0.2)
BASOPHILS NFR BLD AUTO: 0.5 % (ref 0–1.5)
BENZODIAZ UR QL SCN: NEGATIVE
BUN SERPL-MCNC: 19 MG/DL (ref 6–20)
BUN/CREAT SERPL: 17.6 (ref 7–25)
BUPRENORPHINE SERPL-MCNC: NEGATIVE NG/ML
CALCIUM SPEC-SCNC: 9.3 MG/DL (ref 8.6–10.5)
CANNABINOIDS SERPL QL: POSITIVE
CHLORIDE SERPL-SCNC: 100 MMOL/L (ref 98–107)
CO2 SERPL-SCNC: 28.8 MMOL/L (ref 22–29)
COCAINE UR QL: NEGATIVE
CREAT SERPL-MCNC: 1.08 MG/DL (ref 0.76–1.27)
DEPRECATED RDW RBC AUTO: 45.2 FL (ref 37–54)
EGFRCR SERPLBLD CKD-EPI 2021: 80 ML/MIN/1.73
EOSINOPHIL # BLD AUTO: 0.09 10*3/MM3 (ref 0–0.4)
EOSINOPHIL NFR BLD AUTO: 1.4 % (ref 0.3–6.2)
ERYTHROCYTE [DISTWIDTH] IN BLOOD BY AUTOMATED COUNT: 12.5 % (ref 12.3–15.4)
GLUCOSE BLDC GLUCOMTR-MCNC: 140 MG/DL (ref 70–105)
GLUCOSE BLDC GLUCOMTR-MCNC: 205 MG/DL (ref 70–105)
GLUCOSE BLDC GLUCOMTR-MCNC: 212 MG/DL (ref 70–105)
GLUCOSE BLDC GLUCOMTR-MCNC: 232 MG/DL (ref 70–105)
GLUCOSE BLDC GLUCOMTR-MCNC: 264 MG/DL (ref 70–105)
GLUCOSE BLDC GLUCOMTR-MCNC: 318 MG/DL (ref 70–105)
GLUCOSE SERPL-MCNC: 249 MG/DL (ref 65–99)
HCT VFR BLD AUTO: 35.5 % (ref 37.5–51)
HGB BLD-MCNC: 11.9 G/DL (ref 13–17.7)
IMM GRANULOCYTES # BLD AUTO: 0.03 10*3/MM3 (ref 0–0.05)
IMM GRANULOCYTES NFR BLD AUTO: 0.5 % (ref 0–0.5)
LYMPHOCYTES # BLD AUTO: 1.91 10*3/MM3 (ref 0.7–3.1)
LYMPHOCYTES NFR BLD AUTO: 29.8 % (ref 19.6–45.3)
MCH RBC QN AUTO: 32.8 PG (ref 26.6–33)
MCHC RBC AUTO-ENTMCNC: 33.5 G/DL (ref 31.5–35.7)
MCV RBC AUTO: 97.8 FL (ref 79–97)
METHADONE UR QL SCN: NEGATIVE
MONOCYTES # BLD AUTO: 0.7 10*3/MM3 (ref 0.1–0.9)
MONOCYTES NFR BLD AUTO: 10.9 % (ref 5–12)
NEUTROPHILS NFR BLD AUTO: 3.64 10*3/MM3 (ref 1.7–7)
NEUTROPHILS NFR BLD AUTO: 56.9 % (ref 42.7–76)
NRBC BLD AUTO-RTO: 0 /100 WBC (ref 0–0.2)
OPIATES UR QL: NEGATIVE
OXYCODONE UR QL SCN: NEGATIVE
PCP UR QL SCN: NEGATIVE
PLATELET # BLD AUTO: 174 10*3/MM3 (ref 140–450)
PMV BLD AUTO: 11.1 FL (ref 6–12)
POTASSIUM SERPL-SCNC: 4.4 MMOL/L (ref 3.5–5.2)
RBC # BLD AUTO: 3.63 10*6/MM3 (ref 4.14–5.8)
SODIUM SERPL-SCNC: 136 MMOL/L (ref 136–145)
TRICYCLICS UR QL SCN: NEGATIVE
WBC NRBC COR # BLD AUTO: 6.4 10*3/MM3 (ref 3.4–10.8)

## 2024-12-15 PROCEDURE — 25010000002 CEFTRIAXONE PER 250 MG: Performed by: EMERGENCY MEDICINE

## 2024-12-15 PROCEDURE — 63710000001 INSULIN GLARGINE PER 5 UNITS: Performed by: STUDENT IN AN ORGANIZED HEALTH CARE EDUCATION/TRAINING PROGRAM

## 2024-12-15 PROCEDURE — 97165 OT EVAL LOW COMPLEX 30 MIN: CPT

## 2024-12-15 PROCEDURE — 25010000002 ENOXAPARIN PER 10 MG: Performed by: STUDENT IN AN ORGANIZED HEALTH CARE EDUCATION/TRAINING PROGRAM

## 2024-12-15 PROCEDURE — 82948 REAGENT STRIP/BLOOD GLUCOSE: CPT

## 2024-12-15 PROCEDURE — 25810000003 LACTATED RINGERS PER 1000 ML: Performed by: STUDENT IN AN ORGANIZED HEALTH CARE EDUCATION/TRAINING PROGRAM

## 2024-12-15 PROCEDURE — 82948 REAGENT STRIP/BLOOD GLUCOSE: CPT | Performed by: STUDENT IN AN ORGANIZED HEALTH CARE EDUCATION/TRAINING PROGRAM

## 2024-12-15 PROCEDURE — 25010000002 HEPARIN (PORCINE) PER 1000 UNITS: Performed by: EMERGENCY MEDICINE

## 2024-12-15 PROCEDURE — 80048 BASIC METABOLIC PNL TOTAL CA: CPT | Performed by: STUDENT IN AN ORGANIZED HEALTH CARE EDUCATION/TRAINING PROGRAM

## 2024-12-15 PROCEDURE — 97162 PT EVAL MOD COMPLEX 30 MIN: CPT

## 2024-12-15 PROCEDURE — 80306 DRUG TEST PRSMV INSTRMNT: CPT

## 2024-12-15 PROCEDURE — 99222 1ST HOSP IP/OBS MODERATE 55: CPT

## 2024-12-15 PROCEDURE — 63710000001 INSULIN LISPRO (HUMAN) PER 5 UNITS: Performed by: STUDENT IN AN ORGANIZED HEALTH CARE EDUCATION/TRAINING PROGRAM

## 2024-12-15 PROCEDURE — 85025 COMPLETE CBC W/AUTO DIFF WBC: CPT | Performed by: STUDENT IN AN ORGANIZED HEALTH CARE EDUCATION/TRAINING PROGRAM

## 2024-12-15 PROCEDURE — 87040 BLOOD CULTURE FOR BACTERIA: CPT | Performed by: STUDENT IN AN ORGANIZED HEALTH CARE EDUCATION/TRAINING PROGRAM

## 2024-12-15 RX ORDER — LEVOFLOXACIN 750 MG/1
750 TABLET, FILM COATED ORAL EVERY 24 HOURS
Status: DISCONTINUED | OUTPATIENT
Start: 2024-12-15 | End: 2024-12-16 | Stop reason: HOSPADM

## 2024-12-15 RX ORDER — ENOXAPARIN SODIUM 100 MG/ML
40 INJECTION SUBCUTANEOUS EVERY 24 HOURS
Status: DISCONTINUED | OUTPATIENT
Start: 2024-12-15 | End: 2024-12-16 | Stop reason: HOSPADM

## 2024-12-15 RX ORDER — ESCITALOPRAM OXALATE 10 MG/1
10 TABLET ORAL DAILY
Status: DISCONTINUED | OUTPATIENT
Start: 2024-12-16 | End: 2024-12-16 | Stop reason: HOSPADM

## 2024-12-15 RX ADMIN — INSULIN GLARGINE-YFGN 30 UNITS: 100 INJECTION, SOLUTION SUBCUTANEOUS at 08:33

## 2024-12-15 RX ADMIN — CARVEDILOL 3.12 MG: 3.12 TABLET, FILM COATED ORAL at 18:21

## 2024-12-15 RX ADMIN — ENOXAPARIN SODIUM 40 MG: 100 INJECTION SUBCUTANEOUS at 15:38

## 2024-12-15 RX ADMIN — Medication 10 ML: at 08:38

## 2024-12-15 RX ADMIN — INSULIN LISPRO 5 UNITS: 100 INJECTION, SOLUTION INTRAVENOUS; SUBCUTANEOUS at 08:33

## 2024-12-15 RX ADMIN — CEFTRIAXONE 2000 MG: 2 INJECTION, POWDER, FOR SOLUTION INTRAMUSCULAR; INTRAVENOUS at 11:52

## 2024-12-15 RX ADMIN — LEVOFLOXACIN 750 MG: 750 TABLET, FILM COATED ORAL at 14:40

## 2024-12-15 RX ADMIN — CARVEDILOL 3.12 MG: 3.12 TABLET, FILM COATED ORAL at 08:32

## 2024-12-15 RX ADMIN — Medication 10 ML: at 21:49

## 2024-12-15 RX ADMIN — ASPIRIN 81 MG CHEWABLE TABLET 81 MG: 81 TABLET CHEWABLE at 08:32

## 2024-12-15 RX ADMIN — INSULIN LISPRO 5 UNITS: 100 INJECTION, SOLUTION INTRAVENOUS; SUBCUTANEOUS at 21:49

## 2024-12-15 RX ADMIN — FOLIC ACID 1 MG: 1 TABLET ORAL at 08:32

## 2024-12-15 RX ADMIN — SODIUM CHLORIDE, POTASSIUM CHLORIDE, SODIUM LACTATE AND CALCIUM CHLORIDE 75 ML/HR: 600; 310; 30; 20 INJECTION, SOLUTION INTRAVENOUS at 05:32

## 2024-12-15 RX ADMIN — Medication 100 MG: at 08:32

## 2024-12-15 RX ADMIN — HEPARIN SODIUM 5000 UNITS: 5000 INJECTION INTRAVENOUS; SUBCUTANEOUS at 05:32

## 2024-12-15 RX ADMIN — SENNOSIDES AND DOCUSATE SODIUM 2 TABLET: 50; 8.6 TABLET ORAL at 08:32

## 2024-12-15 RX ADMIN — INSULIN LISPRO 10 UNITS: 100 INJECTION, SOLUTION INTRAVENOUS; SUBCUTANEOUS at 11:48

## 2024-12-15 RX ADMIN — POLYETHYLENE GLYCOL 3350 17 G: 17 POWDER, FOR SOLUTION ORAL at 08:32

## 2024-12-15 RX ADMIN — ATORVASTATIN CALCIUM 10 MG: 10 TABLET ORAL at 08:32

## 2024-12-15 NOTE — PLAN OF CARE
Goal Outcome Evaluation:  Plan of Care Reviewed With: patient           Outcome Evaluation: Pt is a 60 y/o male admitted to [facility] on 12/9/24 after being found down at home, and was admitted with a principal diagnosis of DKA. RVP + rhinovirus. PMH significant for DM, dyslipidemia, vitamin D deficiency, ETOH abuse and CAD. Pt reports living home alone, but has teenage children stay with him multiple times throughout the week. Pt typically is independnet with all ADLs and ambulation without AD. Pt this date demosntrates near baseline mobility, with minor balance deficits. Pt with no overt LOB and required no physical assistance to maintain balance. Pt appears safe to return home at d/c, does not have acute PT needs at this time.    Anticipated Discharge Disposition (PT): home

## 2024-12-15 NOTE — THERAPY EVALUATION
Patient Name: Colten Yanez Jr.  : 1965    MRN: [id number]                              Today's Date: 12/15/2024       Admit Date: 2024    Visit Dx:     ICD-10-CM ICD-9-CM   1. Diabetic ketoacidosis with coma associated with type 1 diabetes mellitus  E10.11 250.33   2. Hypothermia, initial encounter  T68.XXXA 991.6   3. Leukocytosis, unspecified type  D72.829 288.60   4. 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    Vitamin D deficiency    Status post angioplasty with stent    Dyslipidemia    DKA (diabetic ketoacidosis)     Past Medical History:   Diagnosis Date    Asthma     Coronary artery disease     Diabetes 1.5, managed as type 1     Hyperlipidemia      Past Surgical History:   Procedure Laterality Date    APPENDECTOMY      CARDIAC CATHETERIZATION      CORONARY ANGIOPLASTY WITH STENT PLACEMENT      SINUS SURGERY      TONSILLECTOMY        General Information       Row Name 12/15/24 1649          Physical Therapy Time and Intention    Document Type evaluation  -EL     Mode of Treatment physical therapy  -EL       Row Name 12/15/24 1649          General Information    Prior Level of Function independent:;all household mobility;ADL's  -EL       Row Name 12/15/24 1649          Living Environment    People in Home alone  -EL       Row Name 12/15/24 1649          Home Main Entrance    Number of Stairs, Main Entrance two  -EL       Row Name 12/15/24 1649          Cognition    Orientation Status (Cognition) oriented x 4  -EL       Row Name 12/15/24 1649          Safety Issues/Impairments Affecting Functional Mobility    Impairments Affecting Function (Mobility) balance  -EL     Comment,  Safety Issues/Impairments (Mobility) Mild balance deficit, attributed to being in bed for days, no overt LOB.  -EL               User Key  (r) = Recorded By, (t) = Taken By, (c) = Cosigned By      Initials Name Provider Type    Tj Herrera, PT Physical Therapist                   Mobility       Row Name 12/15/24 1649          Bed Mobility    Bed Mobility bed mobility (all) activities  -EL     All Activities, [place] (Bed Mobility) independent  -EL       Row Name 12/15/24 1649          Sit-Stand Transfer    Sit-Stand [place] (Transfers) standby assist  -EL       Row Name 12/15/24 1649          Gait/Stairs (Locomotion)    [place] Level (Gait) standby assist  -EL     Distance in Feet (Gait) 450  -EL     Comment, (Gait/Stairs) few minor LOB, but able to self recover  -EL               User Key  (r) = Recorded By, (t) = Taken By, (c) = Cosigned By      Initials Name Provider Type    Tj Herrera, RU Physical Therapist                   Obj/Interventions       [facility] Name 12/15/24 1654          Range of Motion Comprehensive    General Range of Motion bilateral lower extremity ROM WFL  -EL       Row Name 12/15/24 1654          Strength Comprehensive (MMT)    General Manual Muscle Testing (MMT) Assessment no strength deficits identified  -EL       Row Name 12/15/24 1654          Sensory Assessment (Somatosensory)    Sensory Assessment (Somatosensory) sensation intact  -EL               User Key  (r) = Recorded By, (t) = Taken By, (c) = Cosigned By      Initials Name Provider Type    Tj Herrera, PT Physical Therapist                   Goals/Plan    No documentation.                  Clinical Impression       [facility] Name 12/15/24 1655          Pain    Pretreatment Pain Rating 0/10 - no pain  -EL     Posttreatment Pain Rating 0/10 - no pain  -EL       Row Name 12/15/24 1655          Plan of Care Review    Plan of Care Reviewed With patient  -EL     Outcome Evaluation Pt is a 58 y/o male admitted to [facility] on  12/9/24 after being found down at home, and was admitted with a principal diagnosis of DKA. RVP + rhinovirus. PMH significant for DM, dyslipidemia, vitamin D deficiency, ETOH abuse and CAD. Pt reports living home alone, but has teenage children stay with him multiple times throughout the week. Pt typically is independnet with all ADLs and ambulation without AD. Pt this date demosntrates near baseline mobility, with minor balance deficits. Pt with no overt LOB and required no physical assistance to maintain balance. Pt appears safe to return home at d/c, does not have acute PT needs at this time.  -       Row Name 12/15/24 1651          Therapy Assessment/Plan (PT)    Criteria for Skilled Interventions Met (PT) no problems identified which require skilled intervention  -EL     Therapy Frequency (PT) evaluation only  -       Row Name 12/15/24 1655          Vital Signs    O2 Delivery Pre Treatment room air  -EL     O2 Delivery Intra Treatment room air  -EL     O2 Delivery Post Treatment room air  -EL     Pre Patient Position Supine  -EL     Intra Patient Position Standing  -EL     Post Patient Position Sitting  -EL       Row Name 12/15/24 0512          Positioning and Restraints    Pre-Treatment Position in bed  -EL     Post Treatment Position bed  -EL     In Bed notified nsg;supine;call light within reach;encouraged to call for assist;exit alarm on  -EL               User Key  (r) = Recorded By, (t) = Taken By, (c) = Cosigned By      Initials Name Provider Type    Tj Herrera, PT Physical Therapist                   Outcome Measures       Row Name 12/15/24 1701 12/15/24 0800       How much help from another person do you currently need...    Turning from your back to your side while in flat bed without using bedrails? 4  -EL 4  -PS    Moving from lying on back to sitting on the side of a flat bed without bedrails? 4  -EL 3  -PS    Moving to and from a bed to a chair (including a wheelchair)? 4  -EL 3  -PS     Standing up from a chair using your arms (e.g., wheelchair, bedside chair)? 4  -EL 3  -PS    Climbing 3-5 steps with a railing? 4  -EL 3  -PS    To walk in hospital room? 4  -EL 3  -PS    AM-PAC 6 Clicks Score (PT) 24  -EL 19  -PS    Highest Level of Mobility Goal 8 --> Walked 250 feet or more  -EL 6 --> Walk 10 steps or more  -PS      Row Name 12/15/24 1701 12/15/24 1329       Functional Assessment    Outcome Measure Options AM-PAC 6 Clicks Basic Mobility (PT)  -EL AM-PAC 6 Clicks Daily Activity (OT)  -SP              User Key  (r) = Recorded By, (t) = Taken By, (c) = Cosigned By      Initials Name Provider Type    Tj Herrera, PT Physical Therapist    Jensen Pineda, RN Registered Nurse    SP Peterson Tan, OT Occupational Therapist                                 Physical Therapy Education       Title: PT OT SLP Therapies (In Progress)       Topic: Physical Therapy (Done)       Point: Mobility training (Done)       Learning Progress Summary            Patient Acceptance, E,TB, VU by  at 12/15/2024 1702                      Point: Precautions (Done)       Learning Progress Summary            Patient Acceptance, E,TB, VU by  at 12/15/2024 1702                                      User Key       Initials Effective Dates Name Provider Type Discipline     06/23/20 -  Tj Diaz, PT Physical Therapist PT                  PT Recommendation and Plan     Outcome Evaluation: Pt is a 60 y/o male admitted to [facility] on 12/9/24 after being found down at home, and was admitted with a principal diagnosis of DKA. RVP + rhinovirus. PMH significant for DM, dyslipidemia, vitamin D deficiency, ETOH abuse and CAD. Pt reports living home alone, but has teenage children stay with him multiple times throughout the week. Pt typically is independnet with all ADLs and ambulation without AD. Pt this date demosntrates near baseline mobility, with minor balance deficits. Pt with no overt LOB and required no physical assistance to  maintain balance. Pt appears safe to return home at d/c, does not have acute PT needs at this time.     Time Calculation:   PT Evaluation Complexity  History, PT Evaluation Complexity: 1-2 personal factors and/or comorbidities  Examination of Body Systems (PT Eval Complexity): total of 3 or more elements  Clinical Presentation (PT Evaluation Complexity): evolving  Clinical Decision Making (PT Evaluation Complexity): moderate complexity  Overall Complexity (PT Evaluation Complexity): moderate complexity     PT Charges       Row Name 12/15/24 1702             Time Calculation    Start Time 1043  -EL      Stop Time 1101  -EL      Time Calculation (min) 18 min  -EL      PT Received On 12/15/24  -EL                User Key  (r) = Recorded By, (t) = Taken By, (c) = Cosigned By      Initials Name Provider Type    Tj Herrera PT Physical Therapist                  Therapy Charges for Today       Code Description Service Date Service Provider Modifiers Qty    [id number]  PT EVAL MOD COMPLEXITY 3 12/15/2024 Tj Diaz PT GP 1            PT G-Codes  Outcome Measure Options: AM-PAC 6 Clicks Basic Mobility (PT)  AM-PAC 6 Clicks Score (PT): 24  AM-PAC 6 Clicks Score (OT): 24  PT Discharge Summary  Anticipated Discharge Disposition (PT): home    Tj Diaz PT  12/15/2024

## 2024-12-15 NOTE — PLAN OF CARE
Goal Outcome Evaluation:  Plan of Care Reviewed With: patient        Progress: improving     BS dropped to 63, patient was conscious but drowsy, offered food and administered glutose oral gel, BG of 137 upon recheck. BG monitoring every 3 hours. Maintained on fall protocol, call bell within reach. Pt is oriented x 3, and did not score on CIWA all night.

## 2024-12-15 NOTE — PROGRESS NOTES
[facility] MEDICINE SERVICE  DAILY PROGRESS NOTE    NAME: Colten Yanez Jr.  : 1965  MRN: [id number]      LOS: 6 days     PROVIDER OF SERVICE: Ronaldo Parisi MD    Chief Complaint: DKA (diabetic ketoacidosis)    Subjective:     Interval History:  History taken from: patient    No acute overnight events. Patient is awake alert oriented x3.     Review of Systems:   Review of Systems    Objective:     Vital Signs  Temp:  [97.8 °F (36.6 °C)-98.1 °F (36.7 °C)] 97.8 °F (36.6 °C)  Heart Rate:  [] 89  Resp:  [12-23] 19  BP: (106-146)/(61-90) 146/86   Body mass index is 27.32 kg/m².    Constitutional:       Appearance: Normal appearance.   HENT:      Head: Normocephalic and atraumatic.      Nose: Nose normal.      Mouth/Throat:      Mouth: Mucous membranes are moist.   Eyes:      Extraocular Movements: Extraocular movements intact.      Conjunctiva/sclera: Conjunctivae normal.      Pupils: Pupils are equal, round, and reactive to light.   Cardiovascular:      Rate and Rhythm: Normal rate and regular rhythm.      Pulses: Normal pulses.      Heart sounds: Normal heart sounds.   Pulmonary:      Effort: Pulmonary effort is normal.      Breath sounds: Normal breath sounds.   Abdominal:      General: Abdomen is flat. Bowel sounds are normal.      Palpations: Abdomen is soft.   Musculoskeletal:         General: Normal range of motion.      Cervical back: Normal range of motion and neck supple.   Skin:     General: Skin is warm and dry.      Capillary Refill: Capillary refill takes less than 2 seconds.               Scheduled Meds   aspirin, 81 mg, Nasogastric, Daily  atorvastatin, 10 mg, Nasogastric, Daily  carvedilol, 3.125 mg, Nasogastric, BID With Meals  enoxaparin, 40 mg, Subcutaneous, Q24H  folic acid, 1 mg, Nasogastric, Daily  insulin glargine, 30 Units, Subcutaneous, Daily  insulin lispro, 3-14 Units, Subcutaneous, 4x Daily With Meals & Nightly  levoFLOXacin, 750 mg, Oral, Q24H  senna-docusate  sodium, 2 tablet, Nasogastric, BID   And  polyethylene glycol, 17 g, Nasogastric, Daily  sodium chloride, 10 mL, Intravenous, Q12H  thiamine, 100 mg, Nasogastric, Daily       PRN Meds     acetaminophen    senna-docusate sodium **AND** polyethylene glycol **AND** bisacodyl **AND** bisacodyl    Calcium Replacement - Follow Nurse / BPA Driven Protocol    dextrose    dextrose    glucagon (human recombinant)    hydrOXYzine    labetalol    Magnesium Standard Dose Replacement - Follow Nurse / BPA Driven Protocol    nitroglycerin    ondansetron ODT **OR** [DISCONTINUED] ondansetron    Phosphorus Replacement - Follow Nurse / BPA Driven Protocol    Potassium Replacement - Follow Nurse / BPA Driven Protocol    sodium chloride   Infusions         Diagnostic Data    Results from last 7 days   Lab Units 12/15/24  0233 12/14/24  0318   WBC 10*3/mm3 6.40 5.96   HEMOGLOBIN g/dL 11.9* 12.0*   HEMATOCRIT % 35.5* 35.9*   PLATELETS 10*3/mm3 174 169   GLUCOSE mg/dL 249* 298*   CREATININE mg/dL 1.08 0.94   BUN mg/dL 19 17   SODIUM mmol/L 136 133*   POTASSIUM mmol/L 4.4 4.9   AST (SGOT) U/L  --  30   ALT (SGPT) U/L  --  19   ALK PHOS U/L  --  101   BILIRUBIN mg/dL  --  0.3   ANION GAP mmol/L 7.2 7.1       No radiology results for the last day      I reviewed the patient's new clinical results.    Assessment/Plan:   Colten Yanez Jr. is a 59 y.o. male with PMH of Diabetes mellitus type 1.5, dyslipidemia, vitamin D deficiency, CAD with history of stent placement presented to the hospital after being found down at home, and was admitted with a principal diagnosis of DKA (diabetic ketoacidosis).  Per patient for HPI taken from chart review as patient is lethargic and very confused when he is awake.  Patient was found down by neighbors on his porch earlier this morning.  EMS was called and patient was unresponsive with a core temperature of 88.6.  Upon arrival to the emergency department he was placed on a Pedro hugger and a temperature sensing  Dixon was placed.  Initially patient was unresponsive but his mental status improved with IV fluid resuscitation.  Lab work obtained was consistent with DKA and thus DKA protocol with insulin drip was initiated.  CT head was obtained and negative of any abnormality.  CXR was clear.  Lactic acid was elevated at 3.0 and patient had white count of 33.52.  Patient was given empiric antibiotics and blood cultures were obtained.  RVP was + rhinovirus.  Urinalysis was negative for UTI.  No clear bacterial source of infection could be identified.  Patient's mother at bedside states that he drinks probably 2 beers a day.  Patient awake but agitated trying to get up out of bed.  When asked if he drinks alcohol he says no.         Assessments:   Encephalopathy: multifactorial: sepsis/dka, now resolved  Sepsis likely secondary to Bacteremia: Klebsiella , possible source is UTI  Diabetic ketoacidosis - Resolved  Alcohol abuse history, per father Questionable only drinks socially  Acute kidney injury  Hypertension  Hyperlipidemia     Plan:   -Continue Ceftriaxone for bacteremia, CTAP: Mild air prostatic urethra. This may be related to recent instrumentation, however no hydronephrosis, he was in ICU prior  -Will consult ID in AM for abx given he may need go home with IV abx for bactremia, repeat Cultures pending  -DKA resolved, Continue lantus will increase to 40 U bedtime from 35,  continue, SSI, A1c 13  -Continue ASA, coreg, statins, home meds  -Continue Thiamine   -Check CBC/CMP , monitor agap in am   -AM labs will follow    12/15:   -ID reccs noted, psych consulted for suicidal ideations   -AM labs will follow    VTE Prophylaxis:  Pharmacologic & mechanical VTE prophylaxis orders are present.         Code status is   Code Status and Medical Interventions: CPR (Attempt to Resuscitate); Full Support   Ordered at: 12/09/24 2111     Code Status (Patient has no pulse and is not breathing):    CPR (Attempt to Resuscitate)      Medical Interventions (Patient has pulse or is breathing):    Full Support       Plan for disposition: Psych reccs, PT.OT reccs    Time: 30 minutes    Part of this note may be an electronic transcription/translation of spoken language to printed text using the Dragon Dictation System.    Signature: Electronically signed by Ronaldo Parisi MD, 12/15/24, 15:34 EST.  [facility] Hospitalist Team
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
## 2024-12-15 NOTE — THERAPY EVALUATION
Patient Name: Colten Yanez Jr.  : 1965    MRN: [id number]                              Today's Date: 12/15/2024       Admit Date: 2024    Visit Dx:     ICD-10-CM ICD-9-CM   1. Diabetic ketoacidosis with coma associated with type 1 diabetes mellitus  E10.11 250.33   2. Hypothermia, initial encounter  T68.XXXA 991.6   3. Leukocytosis, unspecified type  D72.829 288.60   4. 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    Vitamin D deficiency    Status post angioplasty with stent    Dyslipidemia    DKA (diabetic ketoacidosis)     Past Medical History:   Diagnosis Date    Asthma     Coronary artery disease     Diabetes 1.5, managed as type 1     Hyperlipidemia      Past Surgical History:   Procedure Laterality Date    APPENDECTOMY      CARDIAC CATHETERIZATION      CORONARY ANGIOPLASTY WITH STENT PLACEMENT      SINUS SURGERY      TONSILLECTOMY        General Information       Row Name 12/15/24 1326          OT Time and Intention    Subjective Information no complaints  -SP     Document Type evaluation  -SP     Mode of Treatment occupational therapy  -SP     Patient Effort excellent  -SP       Row Name 12/15/24 1326          General Information    Patient Profile Reviewed yes  -SP     Prior Level of Function independent:;ADL's;home management;all household mobility  -SP     Barriers to Rehab ineffective coping  -SP       Row Name 12/15/24 1326          Living Environment    People in Home alone  -SP       Row Name 12/15/24 1326          Home Main Entrance    Number of Stairs, Main Entrance two  -SP       Row Name 12/15/24 1326          Stairs Within Home, Primary    Number of Stairs, Within  Home, Primary none  -SP       Row Name 12/15/24 1326          Cognition    Orientation Status (Cognition) oriented x 4  -SP       Row Name 12/15/24 1326          Safety Issues/Impairments Affecting Functional Mobility    Impairments Affecting Function (Mobility) balance  -SP               User Key  (r) = Recorded By, (t) = Taken By, (c) = Cosigned By      Initials Name Provider Type    SP Peterson Tan OT Occupational Therapist                     Mobility/ADL's       Row Name 12/15/24 1327          Bed Mobility    Bed Mobility bed mobility (all) activities  -SP     All Activities, [place] (Bed Mobility) independent  -SP       Row Name 12/15/24 1327          Transfers    Transfers sit-stand transfer  -SP       Row Name 12/15/24 1327          Sit-Stand Transfer    Sit-Stand [place] (Transfers) standby assist  -SP       Row Name 12/15/24 1327          Functional Mobility    Functional Mobility- Ind. Level standby assist  -SP     Patient was able to Ambulate yes  -SP       Row Name 12/15/24 1327          Activities of Daily Living    BADL Assessment/Intervention lower body dressing  -SP       Row Name 12/15/24 1327          Lower Body Dressing Assessment/Training    [place] Level (Lower Body Dressing) don;socks  -SP     Position (Lower Body Dressing) edge of bed sitting  -SP               User Key  (r) = Recorded By, (t) = Taken By, (c) = Cosigned By      Initials Name Provider Type    SP Peterson Tan OT Occupational Therapist                   Obj/Interventions       Row Name 12/15/24 1328          Sensory Assessment (Somatosensory)    Sensory Assessment (Somatosensory) UE sensation intact  -SP       Row Name 12/15/24 1328          Range of Motion Comprehensive    General Range of Motion bilateral upper extremity ROM WFL  -SP       Row Name 12/15/24 1328          Strength Comprehensive (MMT)    General Manual Muscle Testing (MMT) Assessment no strength deficits identified  -SP       Row Name  12/15/24 1328          Balance    Balance Assessment sitting dynamic balance;sit to stand dynamic balance;standing dynamic balance  -SP     Dynamic Sitting Balance independent  -SP     Position, Sitting Balance unsupported;sitting edge of bed  -SP     Sit to Stand Dynamic Balance standby assist  -SP     Dynamic Standing Balance standby assist  -SP     Position/Device Used, Standing Balance unsupported  -SP               User Key  (r) = Recorded By, (t) = Taken By, (c) = Cosigned By      Initials Name Provider Type    Peterson Sidhu OT Occupational Therapist                   Goals/Plan    No documentation.                  Clinical Impression       Row Name 12/15/24 1329          Pain Assessment    Pretreatment Pain Rating 0/10 - no pain  -SP     Posttreatment Pain Rating 0/10 - no pain  -SP       Row Name 12/15/24 1329          Plan of Care Review    Plan of Care Reviewed With patient  -SP     Outcome Evaluation Pt is a 60 y/o male admitted to [facility] on 12/9/24 after being found down at home, and was admitted with a principal diagnosis of DKA. RVP + rhinovirus. PMH significant for DM, dyslipidemia, vitamin D deficiency, and CAD. At baseline, pt resides alone in a H with x2 DANGELO, though reports his x2 dependent children stay with him part time(15 and 18 y/o). Pt is IND with all I/ADLs and does not utilize any DME.Upon assessment, pt A&O x4 with no reports of pain. Pt ambulated household distances with SBA, no DME use and no rest breaks. Pt noted to be at baseline and demonstrated safe functional transfers and ADLs. Based off assessment, pt would be appropriate for discharge home. No further skilled OT needs identified. OT will sing-off and complete orders at this time. Please re-consult if pt has a change in status.  -SP       Row Name 12/15/24 1321          Therapy Assessment/Plan (OT)    Criteria for Skilled Therapeutic Interventions Met (OT) no  -SP       Row Name 12/15/24 132          Therapy Plan  Review/Discharge Plan (OT)    Anticipated Discharge Disposition (OT) home with assist  -SP       Row Name 12/15/24 1329          Vital Signs    O2 Delivery Pre Treatment room air  -SP     O2 Delivery Intra Treatment room air  -SP     O2 Delivery Post Treatment room air  -SP     Pre Patient Position Supine  -SP     Intra Patient Position Standing  -SP     Post Patient Position Supine  -SP       Row Name 12/15/24 1329          Positioning and Restraints    Pre-Treatment Position in bed  -SP     Post Treatment Position bed  -SP     In Bed notified nsg;fowlers;call light within reach;encouraged to call for assist;with family/caregiver  -SP               User Key  (r) = Recorded By, (t) = Taken By, (c) = Cosigned By      Initials Name Provider Type    Peterson Sidhu OT Occupational Therapist                   Outcome Measures       Row Name 12/15/24 1326          How much help from another is currently needed...    Putting on and taking off regular lower body clothing? 4  -SP     Bathing (including washing, rinsing, and drying) 4  -SP     Toileting (which includes using toilet bed pan or urinal) 4  -SP     Putting on and taking off regular upper body clothing 4  -SP     Taking care of personal grooming (such as brushing teeth) 4  -SP     Eating meals 4  -SP     AM-PAC 6 Clicks Score (OT) 24  -SP       Row Name 12/15/24 0800          How much help from another person do you currently need...    Turning from your back to your side while in flat bed without using bedrails? 4  -PS     Moving from lying on back to sitting on the side of a flat bed without bedrails? 3  -PS     Moving to and from a bed to a chair (including a wheelchair)? 3  -PS     Standing up from a chair using your arms (e.g., wheelchair, bedside chair)? 3  -PS     Climbing 3-5 steps with a railing? 3  -PS     To walk in hospital room? 3  -PS     AM-PAC 6 Clicks Score (PT) 19  -PS       Row Name 12/15/24 5514          Functional Assessment    Outcome  Measure Options AM-PAC 6 Clicks Daily Activity (OT)  -SP               User Key  (r) = Recorded By, (t) = Taken By, (c) = Cosigned By      Initials Name Provider Type    Jensen Pineda, RN Registered Nurse    Peterson Sidhu OT Occupational Therapist                    Occupational Therapy Education       Title: PT OT SLP Therapies (In Progress)       Topic: Occupational Therapy (In Progress)       Point: ADL training (Done)       Description:   Instruct learner(s) on proper safety adaptation and remediation techniques during self care or transfers.   Instruct in proper use of assistive devices.                  Learning Progress Summary            Patient Acceptance, E,TB, VU by SP at 12/15/2024 1330                      Point: Home exercise program (Not Started)       Description:   Instruct learner(s) on appropriate technique for monitoring, assisting and/or progressing therapeutic exercises/activities.                  Learner Progress:  Not documented in this visit.              Point: Precautions (Not Started)       Description:   Instruct learner(s) on prescribed precautions during self-care and functional transfers.                  Learner Progress:  Not documented in this visit.              Point: Body mechanics (Done)       Description:   Instruct learner(s) on proper positioning and spine alignment during self-care, functional mobility activities and/or exercises.                  Learning Progress Summary            Patient Acceptance, E,TB, VU by SP at 12/15/2024 1330                                      User Key       Initials Effective Dates Name Provider Type Discipline    JIGAR 11/15/23 -  Peterson Tan OT Occupational Therapist OT                  OT Recommendation and Plan     Plan of Care Review  Plan of Care Reviewed With: patient  Outcome Evaluation: [facility] is [facility] 60 [facility] being found down at home, and was admitted with a principal diagnosis of DKA.  RVP + rhinovirus. PMH significant for DM, dyslipidemia, vitamin D deficiency, and CAD. At baseline, pt resides alone in a SSH with x2 DANGELO, though reports his x2 dependent children stay with him part time(15 and 16 y/o). Pt is IND with all I/ADLs and does not utilize any DME.Upon assessment, pt A&O x4 with no reports of pain. Pt ambulated household distances with SBA, no DME use and no rest breaks. Pt noted to be at baseline and demonstrated safe functional transfers and ADLs. Based off assessment, pt would be appropriate for discharge home. No further skilled OT needs identified. OT will sing-off and complete orders at this time. Please re-consult if pt has a change in status.     Time Calculation:         Time Calculation- OT       Row Name 12/15/24 1330             Time Calculation- OT    OT Start Time 1043  -SP      OT Stop Time 1100  -SP      OT Time Calculation (min) 17 min  -SP      OT Received On 12/15/24  -SP                User Key  (r) = Recorded By, (t) = Taken By, (c) = Cosigned By      Initials Name Provider Type    SP Peterson Tan OT Occupational Therapist                  Therapy Charges for Today       Code Description Service Date Service Provider Modifiers Qty    89521998993 HC OT EVAL LOW COMPLEXITY 4 12/15/2024 Peterson Tan OT GO 1                 Peterson Tan OT  12/15/2024

## 2024-12-15 NOTE — CONSULTS
Referring Provider: Ronaldo Parisi MD  Reason for Consultation: depression       Chief complaint depression     Subjective .     History of present illness:  The patient is a 59 y.o. male, presented on 12/9 secondary after being found down on his porch. Patient was hypothermic on arrival, with temperature of 86.8. Admitted for DKA, sepsis, rhinovirus, RAMIN.     PMH: Diabetes mellitus type 1.5, dyslipidemia, vitamin D deficiency, CAD with history of stent placement. Alcohol use     Psychiatry was consulted due to depression.      Patient initially was very agitated, requiring restraints and Precedex for agitation. Patient was seen this afternoon. He was alert and oriented, and calm at time of my assessment. Patient reports that his insurance had lapsed about 1 month ago, so he ran out of insulin. He states he was trying to handle his insulin with diet, but understands now that wasn't the best plan, since he has been insulin dependent. He does report over the last month, at times, he had felt very depressed with some suicidal ideation. At one time, he reported he had a plan to use a knife. However, he states he was scared to do it to himself. He reports increased depression since his divorce last year, and also losing his job at [employer] last year as well. States he had worked at [employer] for 25 years, and was laid off with a severance package. He has been living off his severance package since then, and has not gone back to work. He and his ex-wife have two daughters, who are 15 and 17, and they have shared custody. He has them one weekday, and every other weekend. He currently lives alone, but says his sister is moving in with him.     Patient does not remember the events leading up to him being found in his neighbor's yard, unconscious. He says he doesn't remember doing anything to try to commit suicide. He does report though, that he has been heavily drinking, up to 12 beers or more per day. He says for the past 6  months to a year, his drinking had become excessive. I explained to him that the withdrawal from the alcohol is likely why his agitation was so severe when he was initially admitted.     He states he did take an antidepressant after his divorce, and found it helpful, but doesn't remember the name of it. He thinks he may have been on one medication, and Wellbutrin was added. He denies any increase in seizures while on the Wellbutrin. Patient does have history of seizures in the past, related to diabetes. He denies any withdrawal related seizures.     Patient reports since Thursday, after seeing his daughters in the hospital, he has not had anymore SI. He lists them as protective factors, and reasons to live.     Other than alcohol use, patient reports occasional use of THC gummies, and denies any other illicit substance use. It does not appear a UDS or ethanol level were ordered on admission.     Patient denies any HI/AVH. Denies any history of lauryn or hypomania. Denies any past psychiatric hospitalizations.       Review of Systems   All systems were reviewed and negative except for:  Behavioral/Psych: positive for  depression    The following portions of the patient's history were reviewed and updated as appropriate: allergies, current medications, past family history, past medical history, past social history, past surgical history and problem list.    History    Past psychiatric history : depression, alcohol dependency     Past Medical History:   Diagnosis Date    Asthma     Coronary artery disease     Diabetes 1.5, managed as type 1     Hyperlipidemia           Family History   Problem Relation Age of Onset    Diabetes Mother     Heart disease Mother     Heart disease Father     Heart disease Maternal Grandmother     Diabetes Maternal Grandmother     Stroke Maternal Grandmother     Heart disease Maternal Grandfather     Diabetes Maternal Grandfather     Stroke Maternal Grandfather     Stroke Paternal Grandmother      Heart disease Paternal Grandmother     Diabetes Paternal Grandmother     Stroke Paternal Grandfather     Diabetes Paternal Grandfather     Heart disease Paternal Grandfather         Social History     Tobacco Use    Smoking status: Never    Smokeless tobacco: Current     Types: Chew     Last attempt to quit: 2017   Vaping Use    Vaping status: Never Used   Substance Use Topics    Alcohol use: Yes     Alcohol/week: 14.0 standard drinks of alcohol     Types: 14 Cans of beer per week    Drug use: Not Currently     Types: Marijuana          Medications Prior to Admission   Medication Sig Dispense Refill Last Dose/Taking    Accu-Chek FastClix Lancets misc TEST BLOOD GLUCOSE FOUR TIMES DAILY AS DIRECTED 408 each 3     Alcohol Swabs (B-D SINGLE USE SWABS REGULAR) pads TEST BLOOD GLUCOSE FOUR TIMES DAILY AS DIRECTED 400 each 3     aspirin 81 MG EC tablet ASPIRIN 81 TBEC       atorvastatin (LIPITOR) 10 MG tablet Take 1 tablet by mouth Daily. 90 tablet 3     Blood Glucose Monitoring Suppl (ACCU-CHEK GUIDE) w/Device kit TEST FOUR TIMES DAILY AS DIRECTED 1 kit 0     carvedilol (COREG) 3.125 MG tablet TAKE 1 TABLET BY MOUTH TWICE A  tablet 3     Continuous Blood Gluc  (DEXCOM G6 ) device 1 each Continuous. Pt to use to monitor his blood sugars 1 Device 0     Continuous Blood Gluc Sensor (Dexcom G7 Sensor) misc Use 1 Device Every 10 (Ten) Days. 9 each 3     Glucagon (Baqsimi One Pack) 3 MG/DOSE powder 3 mg into the nostril(s) as directed by provider As Needed (in case of severe hypoglycemia). 1 each 1     glucose blood (Accu-Chek Guide) test strip To check blood sugar daily. 100 each 3     insulin degludec (Tresiba FlexTouch) 100 UNIT/ML solution pen-injector injection Inject 30 Units under the skin into the appropriate area as directed Every Night. 45 mL 3     Insulin Pen Needle (NOVOFINE) 32G X 6 MM misc Use with insulin pen 4x/d 400 each 4     Loratadine (CLARITIN PO) Take  by mouth Daily.        "NovoLOG FlexPen 100 UNIT/ML solution pen-injector sc pen Inject 15 units ac tid plus sliding scale. MDD 60 units daily 45 mL 3     vitamin D (ERGOCALCIFEROL) 1.25 MG (20989 UT) capsule capsule TAKE 1 CAPSULE BY MOUTH ONE TIME PER WEEK 12 capsule 3         Scheduled Meds:  aspirin, 81 mg, Nasogastric, Daily  atorvastatin, 10 mg, Nasogastric, Daily  carvedilol, 3.125 mg, Nasogastric, BID With Meals  enoxaparin, 40 mg, Subcutaneous, Q24H  folic acid, 1 mg, Nasogastric, Daily  insulin glargine, 30 Units, Subcutaneous, Daily  insulin lispro, 3-14 Units, Subcutaneous, 4x Daily With Meals & Nightly  levoFLOXacin, 750 mg, Oral, Q24H  senna-docusate sodium, 2 tablet, Nasogastric, BID   And  polyethylene glycol, 17 g, Nasogastric, Daily  sodium chloride, 10 mL, Intravenous, Q12H  thiamine, 100 mg, Nasogastric, Daily         Continuous Infusions:       PRN Meds:    acetaminophen    senna-docusate sodium **AND** polyethylene glycol **AND** bisacodyl **AND** bisacodyl    Calcium Replacement - Follow Nurse / BPA Driven Protocol    dextrose    dextrose    glucagon (human recombinant)    hydrOXYzine    labetalol    Magnesium Standard Dose Replacement - Follow Nurse / BPA Driven Protocol    nitroglycerin    ondansetron ODT **OR** [DISCONTINUED] ondansetron    Phosphorus Replacement - Follow Nurse / BPA Driven Protocol    Potassium Replacement - Follow Nurse / BPA Driven Protocol    sodium chloride      Allergies:  Patient has no known allergies.      Objective     Vital Signs   /86 (BP Location: Left arm, Patient Position: Lying)   Pulse 89   Temp 97.8 °F (36.6 °C) (Oral)   Resp 19   Ht 172.7 cm (68\")   Wt 81.5 kg (179 lb 10.8 oz)   SpO2 98%   BMI 27.32 kg/m²     Physical Exam:    Musculoskeletal:   Muscle strength and tone: equal bilaterally   Abnormal Movements: None noted.   Gait: MEMO, patient in bed.      General Appearance:    In bed, in NAD.      MENTAL STATUS EXAM   General Appearance:  Cleanly groomed and " "dressed  Eye Contact:  Good eye contact  Attitude:  Cooperative  Motor Activity:  Normal gait, posture  Muscle Strength:  Normal  Speech:  Normal rate, tone, volume  Language:  Spontaneous  Mood and affect:  Anxious and depressed  Hopelessness:  Denies  Loneliness: Denies  Thought Process:  Logical and goal-directed  Associations/ Thought Content:  No delusions  Hallucinations:  None  Suicidal Ideations:  Not present  Homicidal Ideation:  Not present  Sensorium:  Alert  Orientation:  Person, place and time  Immediate Recall, Recent, and Remote Memory:  Intact  Attention Span/ Concentration:  Good  Fund of Knowledge:  Appropriate for age and educational level  Intellectual Functioning:  Average range  Insight:  Fair  Judgement:  Fair  Reliability:  Fair  Impulse Control:  Fair       Result Review:  I have personally reviewed the results from the time of this admission to 12/15/2024 15:57 EST and agree with these findings:  [x]  Laboratory  []  Microbiology  []  Radiology  [x]  EKG/Telemetry   []  Cardiology/Vascular   []  Pathology  []  Old records  []  Other:  Most notable findings include: . Sodium 136    Assessment & Plan       DKA (diabetic ketoacidosis)    Status post coronary artery stent placement    Type 1 diabetes mellitus with hyperglycemia    Vitamin D deficiency    Dyslipidemia     Assessment: major depressive disorder, alcohol use disorder   Treatment Plan: Patient presents with history of depression, with suicidal ideation in the past month. He reports losing his job last year, as well as going through a divorce, with worsening depression since then. Patient says about two weeks ago, he was having SI with a plan to use a knife, but states he was \"scared to do it\". He also endorses heavy drinking, for the past 6 months to a year, drinking 12 plus beers per day. Patient says he doesn't remember the events leading to admission, but doesn't think he did anything to try to kill himself. He does report " he had been out of insulin for a month, but was trying to control it with diet. He understands, talking with me today though, that he is insulin dependent.     I recommended inpatient treatment for both alcohol cessation and depression. Patient would like to discuss with family before he makes a decision. He says his sister is planning to move in with him to help him with his kids, etc. I advised patient I would have case management reach out to him about different options, inpatient vs intensive outpatient. Patient would also benefit from scheduling with psychiatry for medication management and psychotherapy in the future.     Patient agreeable to starting an anti-depressant. He has taking something in the past, that was helpful, but cannot remember the name.     He denies any current SI with any plan or intent.     Ordered Lexapro 10mg daily to start tomorrow.     Continue supportive treatment.     Will continue to follow.   Treatment Plan discussed with: Patient and nursing     I discussed the patients findings and my recommendations with patient and nursing staff    I have reviewed and approved the behavioral health treatment plans and problem list. Yes  Thank you for the consult   Referring MD has access to consult report and progress notes in EMR     EFREN Willard  12/15/24  15:57 EST

## 2024-12-15 NOTE — PLAN OF CARE
Goal Outcome Evaluation:      ID and psych consulted on patient today. Patient started on oral antibiotics. Blood sugars stable. PT/OT worked with patient. Patient walked around entire unit. Plan of care ongoing.

## 2024-12-16 ENCOUNTER — READMISSION MANAGEMENT (OUTPATIENT)
Dept: CALL CENTER | Facility: HOSPITAL | Age: 59
End: 2024-12-16

## 2024-12-16 VITALS
WEIGHT: 181.44 LBS | RESPIRATION RATE: 15 BRPM | OXYGEN SATURATION: 98 % | TEMPERATURE: 98.1 F | HEIGHT: 68 IN | SYSTOLIC BLOOD PRESSURE: 140 MMHG | HEART RATE: 85 BPM | DIASTOLIC BLOOD PRESSURE: 81 MMHG | BODY MASS INDEX: 27.5 KG/M2

## 2024-12-16 LAB
ALBUMIN SERPL-MCNC: 3.5 G/DL (ref 3.5–5.2)
ALBUMIN/GLOB SERPL: 1.5 G/DL
ALP SERPL-CCNC: 97 U/L (ref 39–117)
ALT SERPL W P-5'-P-CCNC: 87 U/L (ref 1–41)
ANION GAP SERPL CALCULATED.3IONS-SCNC: 9.3 MMOL/L (ref 5–15)
APAP SERPL-MCNC: <5 MCG/ML (ref 0–30)
AST SERPL-CCNC: 135 U/L (ref 1–40)
BASOPHILS # BLD AUTO: 0.04 10*3/MM3 (ref 0–0.2)
BASOPHILS NFR BLD AUTO: 0.6 % (ref 0–1.5)
BILIRUB SERPL-MCNC: 0.2 MG/DL (ref 0–1.2)
BUN SERPL-MCNC: 14 MG/DL (ref 6–20)
BUN/CREAT SERPL: 12.7 (ref 7–25)
CALCIUM SPEC-SCNC: 8.8 MG/DL (ref 8.6–10.5)
CHLORIDE SERPL-SCNC: 100 MMOL/L (ref 98–107)
CO2 SERPL-SCNC: 25.7 MMOL/L (ref 22–29)
CREAT SERPL-MCNC: 1.1 MG/DL (ref 0.76–1.27)
DEPRECATED RDW RBC AUTO: 45.3 FL (ref 37–54)
EGFRCR SERPLBLD CKD-EPI 2021: 78.3 ML/MIN/1.73
EOSINOPHIL # BLD AUTO: 0.1 10*3/MM3 (ref 0–0.4)
EOSINOPHIL NFR BLD AUTO: 1.4 % (ref 0.3–6.2)
ERYTHROCYTE [DISTWIDTH] IN BLOOD BY AUTOMATED COUNT: 12.5 % (ref 12.3–15.4)
GLOBULIN UR ELPH-MCNC: 2.4 GM/DL
GLUCOSE BLDC GLUCOMTR-MCNC: 157 MG/DL (ref 70–105)
GLUCOSE BLDC GLUCOMTR-MCNC: 216 MG/DL (ref 70–105)
GLUCOSE BLDC GLUCOMTR-MCNC: 240 MG/DL (ref 70–105)
GLUCOSE BLDC GLUCOMTR-MCNC: 280 MG/DL (ref 70–105)
GLUCOSE BLDC GLUCOMTR-MCNC: 58 MG/DL (ref 70–105)
GLUCOSE SERPL-MCNC: 243 MG/DL (ref 65–99)
HAV IGM SERPL QL IA: NORMAL
HBV CORE IGM SERPL QL IA: NORMAL
HBV SURFACE AG SERPL QL IA: NORMAL
HCT VFR BLD AUTO: 34.2 % (ref 37.5–51)
HCV AB SER QL: NORMAL
HGB BLD-MCNC: 11.7 G/DL (ref 13–17.7)
IMM GRANULOCYTES # BLD AUTO: 0.08 10*3/MM3 (ref 0–0.05)
IMM GRANULOCYTES NFR BLD AUTO: 1.1 % (ref 0–0.5)
LYMPHOCYTES # BLD AUTO: 2.12 10*3/MM3 (ref 0.7–3.1)
LYMPHOCYTES NFR BLD AUTO: 29.2 % (ref 19.6–45.3)
MAGNESIUM SERPL-MCNC: 2.2 MG/DL (ref 1.6–2.6)
MCH RBC QN AUTO: 33.3 PG (ref 26.6–33)
MCHC RBC AUTO-ENTMCNC: 34.2 G/DL (ref 31.5–35.7)
MCV RBC AUTO: 97.4 FL (ref 79–97)
MONOCYTES # BLD AUTO: 0.96 10*3/MM3 (ref 0.1–0.9)
MONOCYTES NFR BLD AUTO: 13.2 % (ref 5–12)
NEUTROPHILS NFR BLD AUTO: 3.97 10*3/MM3 (ref 1.7–7)
NEUTROPHILS NFR BLD AUTO: 54.5 % (ref 42.7–76)
NRBC BLD AUTO-RTO: 0 /100 WBC (ref 0–0.2)
PHOSPHATE SERPL-MCNC: 2.9 MG/DL (ref 2.5–4.5)
PLATELET # BLD AUTO: 251 10*3/MM3 (ref 140–450)
PMV BLD AUTO: 11 FL (ref 6–12)
POTASSIUM SERPL-SCNC: 4.1 MMOL/L (ref 3.5–5.2)
PROT SERPL-MCNC: 5.9 G/DL (ref 6–8.5)
RBC # BLD AUTO: 3.51 10*6/MM3 (ref 4.14–5.8)
SODIUM SERPL-SCNC: 135 MMOL/L (ref 136–145)
WBC NRBC COR # BLD AUTO: 7.27 10*3/MM3 (ref 3.4–10.8)

## 2024-12-16 PROCEDURE — 85025 COMPLETE CBC W/AUTO DIFF WBC: CPT | Performed by: STUDENT IN AN ORGANIZED HEALTH CARE EDUCATION/TRAINING PROGRAM

## 2024-12-16 PROCEDURE — 82948 REAGENT STRIP/BLOOD GLUCOSE: CPT

## 2024-12-16 PROCEDURE — 80143 DRUG ASSAY ACETAMINOPHEN: CPT | Performed by: STUDENT IN AN ORGANIZED HEALTH CARE EDUCATION/TRAINING PROGRAM

## 2024-12-16 PROCEDURE — 84100 ASSAY OF PHOSPHORUS: CPT | Performed by: STUDENT IN AN ORGANIZED HEALTH CARE EDUCATION/TRAINING PROGRAM

## 2024-12-16 PROCEDURE — 80074 ACUTE HEPATITIS PANEL: CPT | Performed by: STUDENT IN AN ORGANIZED HEALTH CARE EDUCATION/TRAINING PROGRAM

## 2024-12-16 PROCEDURE — 80053 COMPREHEN METABOLIC PANEL: CPT | Performed by: STUDENT IN AN ORGANIZED HEALTH CARE EDUCATION/TRAINING PROGRAM

## 2024-12-16 PROCEDURE — 99222 1ST HOSP IP/OBS MODERATE 55: CPT | Performed by: INTERNAL MEDICINE

## 2024-12-16 PROCEDURE — 63710000001 INSULIN LISPRO (HUMAN) PER 5 UNITS: Performed by: STUDENT IN AN ORGANIZED HEALTH CARE EDUCATION/TRAINING PROGRAM

## 2024-12-16 PROCEDURE — 83735 ASSAY OF MAGNESIUM: CPT | Performed by: STUDENT IN AN ORGANIZED HEALTH CARE EDUCATION/TRAINING PROGRAM

## 2024-12-16 PROCEDURE — 99232 SBSQ HOSP IP/OBS MODERATE 35: CPT

## 2024-12-16 RX ORDER — ESCITALOPRAM OXALATE 10 MG/1
10 TABLET ORAL DAILY
Qty: 30 TABLET | Refills: 2 | Status: SHIPPED | OUTPATIENT
Start: 2024-12-16 | End: 2024-12-16

## 2024-12-16 RX ORDER — ESCITALOPRAM OXALATE 10 MG/1
10 TABLET ORAL DAILY
Qty: 30 TABLET | Refills: 2 | Status: SHIPPED | OUTPATIENT
Start: 2024-12-16 | End: 2025-12-16

## 2024-12-16 RX ORDER — HUMAN INSULIN 100 [IU]/ML
14 INJECTION, SUSPENSION SUBCUTANEOUS
Qty: 15 ML | Refills: 0 | Status: SHIPPED | OUTPATIENT
Start: 2024-12-16 | End: 2024-12-16

## 2024-12-16 RX ORDER — BLOOD SUGAR DIAGNOSTIC
1 STRIP MISCELLANEOUS
Qty: 150 EACH | Refills: 2 | Status: SHIPPED | OUTPATIENT
Start: 2024-12-16

## 2024-12-16 RX ORDER — LEVOFLOXACIN 750 MG/1
750 TABLET, FILM COATED ORAL EVERY 24 HOURS
Qty: 6 TABLET | Refills: 0 | Status: SHIPPED | OUTPATIENT
Start: 2024-12-17 | End: 2024-12-23

## 2024-12-16 RX ORDER — THIAMINE HCL 100 MG
100 TABLET ORAL DAILY
Qty: 30 TABLET | Refills: 0 | Status: SHIPPED | OUTPATIENT
Start: 2024-12-17 | End: 2025-01-16

## 2024-12-16 RX ORDER — LEVOFLOXACIN 750 MG/1
750 TABLET, FILM COATED ORAL EVERY 24 HOURS
Qty: 6 TABLET | Refills: 0 | Status: SHIPPED | OUTPATIENT
Start: 2024-12-17 | End: 2024-12-16

## 2024-12-16 RX ORDER — PEN NEEDLE, DIABETIC 32GX 5/32"
1 NEEDLE, DISPOSABLE MISCELLANEOUS 3 TIMES DAILY
Qty: 100 EACH | Refills: 2 | Status: SHIPPED | OUTPATIENT
Start: 2024-12-16 | End: 2024-12-16

## 2024-12-16 RX ORDER — THIAMINE HCL 100 MG
100 TABLET ORAL DAILY
Qty: 30 TABLET | Refills: 0 | Status: SHIPPED | OUTPATIENT
Start: 2024-12-17 | End: 2024-12-16

## 2024-12-16 RX ORDER — BLOOD SUGAR DIAGNOSTIC
1 STRIP MISCELLANEOUS
Qty: 150 EACH | Refills: 2 | Status: SHIPPED | OUTPATIENT
Start: 2024-12-16 | End: 2024-12-16

## 2024-12-16 RX ORDER — ACYCLOVIR 400 MG/1
1 TABLET ORAL
Qty: 3 EACH | Refills: 2 | Status: SHIPPED | OUTPATIENT
Start: 2024-12-16

## 2024-12-16 RX ORDER — HUMAN INSULIN 100 [IU]/ML
14 INJECTION, SUSPENSION SUBCUTANEOUS
Qty: 15 ML | Refills: 0 | Status: SHIPPED | OUTPATIENT
Start: 2024-12-16 | End: 2025-01-15

## 2024-12-16 RX ORDER — INSULIN LISPRO 100 [IU]/ML
2-10 INJECTION, SOLUTION INTRAVENOUS; SUBCUTANEOUS
Status: DISCONTINUED | OUTPATIENT
Start: 2024-12-16 | End: 2024-12-16 | Stop reason: HOSPADM

## 2024-12-16 RX ORDER — PEN NEEDLE, DIABETIC 32GX 5/32"
1 NEEDLE, DISPOSABLE MISCELLANEOUS 3 TIMES DAILY
Qty: 100 EACH | Refills: 2 | Status: SHIPPED | OUTPATIENT
Start: 2024-12-16

## 2024-12-16 RX ADMIN — INSULIN LISPRO 5 UNITS: 100 INJECTION, SOLUTION INTRAVENOUS; SUBCUTANEOUS at 08:16

## 2024-12-16 RX ADMIN — ESCITALOPRAM OXALATE 10 MG: 10 TABLET ORAL at 08:15

## 2024-12-16 RX ADMIN — ATORVASTATIN CALCIUM 10 MG: 10 TABLET ORAL at 08:15

## 2024-12-16 RX ADMIN — INSULIN LISPRO 3 UNITS: 100 INJECTION, SOLUTION INTRAVENOUS; SUBCUTANEOUS at 12:12

## 2024-12-16 RX ADMIN — FOLIC ACID 1 MG: 1 TABLET ORAL at 08:15

## 2024-12-16 RX ADMIN — CARVEDILOL 3.12 MG: 3.12 TABLET, FILM COATED ORAL at 17:13

## 2024-12-16 RX ADMIN — LEVOFLOXACIN 750 MG: 750 TABLET, FILM COATED ORAL at 15:18

## 2024-12-16 RX ADMIN — ASPIRIN 81 MG CHEWABLE TABLET 81 MG: 81 TABLET CHEWABLE at 08:15

## 2024-12-16 RX ADMIN — Medication 100 MG: at 08:15

## 2024-12-16 RX ADMIN — CARVEDILOL 3.12 MG: 3.12 TABLET, FILM COATED ORAL at 08:15

## 2024-12-16 RX ADMIN — Medication 10 ML: at 08:15

## 2024-12-16 RX ADMIN — INSULIN GLARGINE-YFGN 30 UNITS: 100 INJECTION, SOLUTION SUBCUTANEOUS at 08:16

## 2024-12-16 NOTE — PROGRESS NOTES
Chief complaint depression     Subjective .     History of present illness:  The patient is a 59 y.o. male presented on 12/9 secondary after being found down on his porch. Patient was hypothermic on arrival, with temperature of 86.8. Admitted for DKA, sepsis, rhinovirus, RAMIN.      PMH: Diabetes mellitus type 1.5, dyslipidemia, vitamin D deficiency, CAD with history of stent placement. Alcohol use      Psychiatry was consulted due to depression.       Patient initially was very agitated, requiring restraints and Precedex for agitation. Patient was seen this afternoon. He was alert and oriented, and calm at time of my assessment. Patient reports that his insurance had lapsed about 1 month ago, so he ran out of insulin. He states he was trying to handle his insulin with diet, but understands now that wasn't the best plan, since he has been insulin dependent. He does report over the last month, at times, he had felt very depressed with some suicidal ideation. At one time, he reported he had a plan to use a knife. However, he states he was scared to do it to himself. He reports increased depression since his divorce last year, and also losing his job at [employer] last year as well. States he had worked at [employer] for 25 years, and was laid off with a severance package. He has been living off his severance package since then, and has not gone back to work. He and his ex-wife have two daughters, who are 15 and 17, and they have shared custody. He has them one weekday, and every other weekend. He currently lives alone, but says his sister is moving in with him.      Patient does not remember the events leading up to him being found in his neighbor's yard, unconscious. He says he doesn't remember doing anything to try to commit suicide. He does report though, that he has been heavily drinking, up to 12 beers or more per day. He says for the past 6 months to a year, his drinking had become excessive. I explained to him that the  withdrawal from the alcohol is likely why his agitation was so severe when he was initially admitted.      He states he did take an antidepressant after his divorce, and found it helpful, but doesn't remember the name of it. He thinks he may have been on one medication, and Wellbutrin was added. He denies any increase in seizures while on the Wellbutrin. Patient does have history of seizures in the past, related to diabetes. He denies any withdrawal related seizures.      Patient reports since Thursday, after seeing his daughters in the hospital, he has not had anymore SI. He lists them as protective factors, and reasons to live.      Other than alcohol use, patient reports occasional use of THC gummies, and denies any other illicit substance use. It does not appear a UDS or ethanol level were ordered on admission.      Patient denies any HI/AVH. Denies any history of lauryn or hypomania. Denies any past psychiatric hospitalizations.     12/16/24: Patient seen this AM. Patient states he is feeling a lot better. He was able to get a shower today, and has been ambulating around the unit. He was able to visit with family and talk with his sister as well. He says everyone has been very supportive, and have told him that he can reach out to them for help. He says he has family who could have helped him pay for his diabetes supplies, but he hadn't reached out to them.     Patient says he has a positive outlook going forward. His sister is going to move in with him temporarily, and he is wanting to get back to working, and how he was before.     He is agreeable to staying on the Lexapro. I recommended him getting an appointment outpatient for medication management and psychotherapy. He states he had a primary care provider he liked off of [facility], but the is unsure of her name. It appears, from the address given, it may have been LANDON Giron, with [facility].     Patient denies any suicidal ideation now. Denies  any HI/AVH.   Review of Systems   All systems were reviewed and negative except for:  Behavioral/Psych: positive for  depression    The following portions of the patient's history were reviewed and updated as appropriate: allergies, current medications, past family history, past medical history, past social history, past surgical history and problem list.    History    Past psychiatric history : depression, alcohol use        Medications Prior to Admission   Medication Sig Dispense Refill Last Dose/Taking    Accu-Chek FastClix Lancets misc TEST BLOOD GLUCOSE FOUR TIMES DAILY AS DIRECTED 408 each 3     Alcohol Swabs (B-D SINGLE USE SWABS REGULAR) pads TEST BLOOD GLUCOSE FOUR TIMES DAILY AS DIRECTED 400 each 3     aspirin 81 MG EC tablet ASPIRIN 81 TBEC       atorvastatin (LIPITOR) 10 MG tablet Take 1 tablet by mouth Daily. 90 tablet 3     Blood Glucose Monitoring Suppl (ACCU-CHEK GUIDE) w/Device kit TEST FOUR TIMES DAILY AS DIRECTED 1 kit 0     carvedilol (COREG) 3.125 MG tablet TAKE 1 TABLET BY MOUTH TWICE A  tablet 3     Continuous Blood Gluc  (DEXCOM G6 ) device 1 each Continuous. Pt to use to monitor his blood sugars 1 Device 0     Continuous Blood Gluc Sensor (Dexcom G7 Sensor) misc Use 1 Device Every 10 (Ten) Days. 9 each 3     Glucagon (Baqsimi One Pack) 3 MG/DOSE powder 3 mg into the nostril(s) as directed by provider As Needed (in case of severe hypoglycemia). 1 each 1     glucose blood (Accu-Chek Guide) test strip To check blood sugar daily. 100 each 3     insulin degludec (Tresiba FlexTouch) 100 UNIT/ML solution pen-injector injection Inject 30 Units under the skin into the appropriate area as directed Every Night. 45 mL 3     Insulin Pen Needle (NOVOFINE) 32G X 6 MM misc Use with insulin pen 4x/d 400 each 4     Loratadine (CLARITIN PO) Take  by mouth Daily.       NovoLOG FlexPen 100 UNIT/ML solution pen-injector sc pen Inject 15 units ac tid plus sliding scale. MDD 60 units daily 45  "mL 3     vitamin D (ERGOCALCIFEROL) 1.25 MG (46041 UT) capsule capsule TAKE 1 CAPSULE BY MOUTH ONE TIME PER WEEK 12 capsule 3         Scheduled Meds:  aspirin, 81 mg, Nasogastric, Daily  [Held by provider] atorvastatin, 10 mg, Nasogastric, Daily  carvedilol, 3.125 mg, Nasogastric, BID With Meals  enoxaparin, 40 mg, Subcutaneous, Q24H  escitalopram, 10 mg, Oral, Daily  folic acid, 1 mg, Nasogastric, Daily  insulin glargine, 30 Units, Subcutaneous, Daily  insulin lispro, 3-14 Units, Subcutaneous, 4x Daily With Meals & Nightly  levoFLOXacin, 750 mg, Oral, Q24H  senna-docusate sodium, 2 tablet, Nasogastric, BID   And  polyethylene glycol, 17 g, Nasogastric, Daily  sodium chloride, 10 mL, Intravenous, Q12H  thiamine, 100 mg, Nasogastric, Daily         Continuous Infusions:       PRN Meds:    acetaminophen    senna-docusate sodium **AND** polyethylene glycol **AND** bisacodyl **AND** bisacodyl    Calcium Replacement - Follow Nurse / BPA Driven Protocol    dextrose    dextrose    glucagon (human recombinant)    hydrOXYzine    labetalol    Magnesium Standard Dose Replacement - Follow Nurse / BPA Driven Protocol    nitroglycerin    ondansetron ODT **OR** [DISCONTINUED] ondansetron    Phosphorus Replacement - Follow Nurse / BPA Driven Protocol    Potassium Replacement - Follow Nurse / BPA Driven Protocol    sodium chloride      Allergies:  Patient has no known allergies.      Objective     Vital Signs   /81 (BP Location: Left arm, Patient Position: Sitting)   Pulse 85   Temp 98.1 °F (36.7 °C) (Oral)   Resp 15   Ht 172.7 cm (68\")   Wt 82.3 kg (181 lb 7 oz)   SpO2 98%   BMI 27.59 kg/m²     Physical Exam:    Musculoskeletal:   Muscle strength and tone: equal bilaterally   Abnormal Movements: None noted.   Gait: MEMO, patient in bed.                 General Appearance:    In bed, in NAD.       MENTAL STATUS EXAM   General Appearance:  Cleanly groomed and dressed  Eye Contact:  Good eye contact  Attitude:  " "Cooperative  Motor Activity:  Normal gait, posture  Muscle Strength:  Normal  Speech:  Normal rate, tone, volume  Language:  Spontaneous  Mood and affect:  Anxious and depressed  Hopelessness:  Denies  Loneliness: Denies  Thought Process:  Logical and goal-directed  Associations/ Thought Content:  No delusions  Hallucinations:  None  Suicidal Ideations:  Not present  Homicidal Ideation:  Not present  Sensorium:  Alert  Orientation:  Person, place and time  Immediate Recall, Recent, and Remote Memory:  Intact  Attention Span/ Concentration:  Good  Fund of Knowledge:  Appropriate for age and educational level  Intellectual Functioning:  Average range  Insight:  Fair  Judgement:  Fair  Reliability:  Fair  Impulse Control:  Fair    Result Review:  I have personally reviewed the results from the time of this admission to 12/16/2024 13:26 EST and agree with these findings:  [x]  Laboratory  []  Microbiology  []  Radiology  [x]  EKG/Telemetry   []  Cardiology/Vascular   []  Pathology  []  Old records  []  Other:      Assessment & Plan       DKA (diabetic ketoacidosis)    Status post coronary artery stent placement    Type 1 diabetes mellitus with hyperglycemia    Vitamin D deficiency    Dyslipidemia           Assessment: major depressive disorder, alcohol use disorder  Treatment Plan: Patient presents with history of depression, with suicidal ideation in the past month. He reports losing his job last year, as well as going through a divorce, with worsening depression since then. Patient says about two weeks ago, he was having SI with a plan to use a knife, but states he was \"scared to do it\". He also endorses heavy drinking, for the past 6 months to a year, drinking 12 plus beers per day. Patient says he doesn't remember the events leading to admission, but doesn't think he did anything to try to kill himself. He does report he had been out of insulin for a month, but was trying to control it with diet. He understands, " talking with me today though, that he is insulin dependent.      I recommended inpatient treatment for both alcohol cessation and depression. Patient would like to discuss with family before he makes a decision. He says his sister is planning to move in with him to help him with his kids, etc. I advised patient I would have case management reach out to him about different options, inpatient vs intensive outpatient. Patient would also benefit from scheduling with psychiatry for medication management and psychotherapy in the future. [facility] is an option if he remains uninsured, as they offer a sliding scale fee schedule.      Patient would like to continue Lexapro 10mg. Advised to follow up with primary care provider. Previously followed by LANDON Lopez, at [facility]. Their clinic number is [phone].      He denies any current SI with any plan or intent.      Continue supportive treatment.     Nothing further needed from psychiatry standpoint. We will sign off, but be available as needed for questions.     Treatment Plan discussed with: Patient    I discussed the patients findings and my recommendations with patient    I have reviewed and approved the behavioral health treatment plans and problem list. Yes     Referring MD has access to consult report and progress notes in EMR     EFREN Willard  12/16/24  13:26 EST

## 2024-12-16 NOTE — CONSULTS
"Diabetes Education  Assessment/Teaching    Patient Name:  Colten Yanez Jr.  YOB: 1965  MRN: [id number]  Admit Date:  12/9/2024      Assessment Date:  12/16/2024  Flowsheet Row Most Recent Value   General Information     Referral From: MD order  [Consult received due to adm with DKA. Adm bs >700. A1c this adm 13.1%.]   Height 172.7 cm (68\")   Height Method Stated   Weight 82.3 kg (181 lb 7 oz)   Weight Method Bed scale   Pregnancy Assessment    Diabetes History    What type of diabetes do you have? Type 1   Do you test your blood sugar at home? no  [Has not been checking bs at home since he was not taking insulin.]   Education Preferences    Nutrition Information    Assessment Topics    DM Goals             Flowsheet Row Most Recent Value   DM Education Needs    Meter Has own   Meter Type Accuchek  [Accuchek Guide, meter is 2 years old.]   Frequency of Testing AC/HS  [Discussed importance of checking bs ac and hs if not wearing continuous glucose sensor.]   Blood Glucose Target Range Discussed A1c result of 13.1%. Discussed healthy bs range and healthy A1c target. Discussed importance of bs control.   Medication Insulin, Pen  [Pt was to be taking Tresiba 25 units daily and Novolog per sliding scale. Pt states has been out of insulin for about 1 month and has not taken for this amount of time.]   Problem Solving Hyperglycemia, Sick days, Signs, Symptoms, Treatment  [Discussed importance of checking urine for ketones when bs >250. Pt states he has Baqsimi at home.]   Reducing Risks A1C testing  [Gave A1c info sheet.]   Motivation Engaged   Teaching Method Explanation, Discussion, Handouts   Patient Response Verbalized understanding              Other Comments:  Pt has type 1 diabetes. He lost his job and health insurance last year and has been paying for insulins over-the-counter. Pt states he is not able to continue paying for them. Discussed ReliOn brand insulins at [facility]. Reviewed Novolin 70/30 " insulin. Discussed how it has longer- and short-acting insulin in the mixture. Pt would like to try this insulin. Discussed pricing. Pt requesting rxs be started for the insulin pens and pen needles. Pt has Accuchek Guide Me meter. He is requesting rxs be started for test strips. Discussed these strips would be more expensive. Discussed he could purchase ReliOn meter and test strips and [facility]. Pt states he will purchase the Accuchek Guide test strips to use for backup when off sensor. Pt states he does want to pay out-of-pocket for the Dexcom G7 continuous glucose sensors. He states he does control his bs better when he wears the sensors. Rxs have been started for ReliOn Novolin 70/30 pens, ReliOn pen needles, Accuchek Guide test strips and Dexcom G7 sensors.     Pt understands importance of always taking his insulin to prevent DKA. Pt verbalized understanding of info covered. Secure chat has been sent to MD to request rxs started by educator be sent to [facility]. Nurse is to be checking with pt regarding which [facility] pt wanting the rxs to go to. Pt with no additional questions for educator at this time.     Electronically signed by:  Aylin Temple RN  12/16/24 14:22 EST
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
## 2024-12-16 NOTE — PLAN OF CARE
Goal Outcome Evaluation:  Plan of Care Reviewed With: patient        Progress: improving     Pt ambulated around the unit, no untoward signs or symptoms after ambulation. Vitally stable. Call bell within reach.

## 2024-12-16 NOTE — PROGRESS NOTES
Nutrition Services  Patient Name: Colten Yanez Jr.  YOB: 1965  MRN: [id number]  Admission date: 12/9/2024    PROGRESS NOTE      Nutrition Intervention Updates: Continue current diet and encourage good PO intakes.         Encounter Information: Check on for PO intakes.  SLP saw 12/14 and recommended a regular diet.  ID and psych consulted 12/15.       PO Diet: Diet: Cardiac, Diabetic; Healthy Heart (2-3 Na+); Consistent Carbohydrate; Fluid Consistency: Thin (IDDSI 0)   PO Supplements: None ordered    PO Intake:  100% x 3 documented meals since use of TF       Current nutrition support:    Nutrition support review:        Labs (reviewed below): Hyponatremia-management per attending.         GI Function:  Last documented 12/15 (yesterday)       Brief weight review   Wt Readings from Last 10 Encounters:   12/16/24 0600 82.3 kg (181 lb 7 oz)   12/15/24 0601 81.5 kg (179 lb 10.8 oz)   12/15/24 0600 82.2 kg (181 lb 3.5 oz)   12/14/24 0515 82.5 kg (181 lb 14.1 oz)   12/13/24 1650 82 kg (180 lb 12.4 oz)   12/13/24 0423 83.6 kg (184 lb 4.9 oz)   12/12/24 0500 81 kg (178 lb 9.2 oz)   12/11/24 0514 81.9 kg (180 lb 8.9 oz)   12/09/24 1722 86.5 kg (190 lb 11.2 oz)   10/02/23 1038 86.5 kg (190 lb 12.8 oz)   05/02/22 1038 91.6 kg (202 lb)   11/01/21 1118 94.8 kg (209 lb)   05/05/21 0949 90.3 kg (199 lb)   11/02/20 1503 87.5 kg (193 lb)   05/04/20 1448 88.5 kg (195 lb)   04/20/20 1432 89.4 kg (197 lb)   10/30/19 1319 89.4 kg (197 lb)   10/14/19 1553 88.5 kg (195 lb)        Results from last 7 days   Lab Units 12/16/24  0247 12/15/24  0233 12/14/24  0318 12/13/24  1534 12/13/24  0438   SODIUM mmol/L 135* 136 133*   < > 142   POTASSIUM mmol/L 4.1 4.4 4.9   < > 3.9   CHLORIDE mmol/L 100 100 99   < > 104   CO2 mmol/L 25.7 28.8 26.9   < > 27.5   BUN mg/dL 14 19 17   < > 14   CREATININE mg/dL 1.10 1.08 0.94   < > 0.81   CALCIUM mg/dL 8.8 9.3 9.4   < > 10.0   BILIRUBIN mg/dL 0.2  --  0.3  --  0.5   ALK PHOS U/L 97  --  101   --  117   ALT (SGPT) U/L 87*  --  19  --  24   AST (SGOT) U/L 135*  --  30  --  35   GLUCOSE mg/dL 243* 249* 298*   < > 113*    < > = values in this interval not displayed.     Results from last 7 days   Lab Units 12/16/24  0247 12/15/24  0233 12/14/24  0318 12/13/24  1534   MAGNESIUM mg/dL 2.2  --  2.1 2.0   PHOSPHORUS mg/dL 2.9  --  2.9  --    HEMOGLOBIN g/dL 11.7*   < > 12.0*  --    HEMATOCRIT % 34.2*   < > 35.9*  --     < > = values in this interval not displayed.       RD to follow up per protocol.    Electronically signed by:  Belle Concepcion RD  12/16/24 13:58 EST

## 2024-12-16 NOTE — CASE MANAGEMENT/SOCIAL WORK
Social Work Assessment  [facility]     Patient Name: Colten Yanez Jr.  MRN: [id number]  Today's Date: 12/15/2024    Admit Date: 12/9/2024         Discharge Plan       Row Name 12/15/24 2103       Plan    Plan Comments LSW consulted for therapy/psych resources, insurance, medications. LSW spoke to psych EFREN, who reported Pt has severe depression and would benefit from OP therapy services. LSW sent an email to [employer] to Screen Pt for Medicaid. LSW met with pt at bedside introduced self and explained role. Pt reported that he went through a divorce and then lost his job of 25 years with [employer] a year ago. Pt stated that he has two teenage girls, 15 and 17. Pt reported having a negative experience with therapy. Pt reported that he was in marriage counseling and then individual for a while but felt it made him angry and brought up things he had forgotten. Pt reported that he drinks alcohol and drank heavily over this last year but does not feel it causes any harm. Pt would like to take medications for depression and see a therapist. LSW explained options with and without insurance. LSW added exchange information to [facility] as well, in case Medicaid is not an option. Pt reported that he was on insulin and when he lost his insurance, he just stopped taking his diabetes medications/dexcom and insulins. Pt is concerned about cost without insurance. LSW updated weekday SW on concerns. Pt stated that he has a good support system and wants to work on getting better. Pt stated that he is no longer having suicidal thoughts but does still feel depressed and would like help. LSW asked Pt about any other resources, but Pt reported that he is ok affording food, and bills for now and can rely on family if needed. Pt declined resource packet at this time. SW following.             KATHARINE Vega, MSW    Phone: [phone]  Fax: [phone]  Email: [email or web address]

## 2024-12-17 ENCOUNTER — TELEPHONE (OUTPATIENT)
Dept: DIABETES SERVICES | Facility: HOSPITAL | Age: 59
End: 2024-12-17

## 2024-12-17 NOTE — CASE MANAGEMENT/SOCIAL WORK
Case Management Discharge Note                Selected Continued Care - Discharged on 12/16/2024 Admission date: 12/9/2024 - Discharge disposition: Home or Self Care            Transportation Services  Private: Car    Final Discharge Disposition Code: 01 - home or self-care

## 2024-12-17 NOTE — OUTREACH NOTE
Prep Survey      Flowsheet Row Responses   [facility] facility patient discharged from? [facility]   Is LACE score < 7 ? No   Eligibility Readm Mgmt   Discharge diagnosis DKA   Does the patient have one of the following disease processes/diagnoses(primary or secondary)? Other   Does the patient have Home health ordered? No   Is there a DME ordered? No   Prep survey completed? Yes            KIMBERLY SOTO - Registered Nurse

## 2024-12-18 ENCOUNTER — TELEPHONE (OUTPATIENT)
Dept: DIABETES SERVICES | Facility: HOSPITAL | Age: 59
End: 2024-12-18

## 2024-12-18 ENCOUNTER — READMISSION MANAGEMENT (OUTPATIENT)
Dept: CALL CENTER | Facility: HOSPITAL | Age: 59
End: 2024-12-18

## 2024-12-18 NOTE — OUTREACH NOTE
Medical Week 1 Survey      Flowsheet Row Responses   [facility] patient discharged from? [facility]   Does the patient have one of the following disease processes/diagnoses(primary or secondary)? Other   Week 1 attempt successful? Yes   Call start time 1048   Call end time 1050   Discharge diagnosis DKA   Is patient permission given to speak with other caregiver? Yes   List who call center can speak with Ириан mother   Person spoke with today (if not patient) and relationship Ирина mother   Did the patient receive a copy of their discharge instructions? Yes   Nursing interventions Reviewed instructions with patient   What is the patient's perception of their health status since discharge? Improving   Is the patient/caregiver able to teach back signs and symptoms related to disease process for when to call PCP? Yes   Is the patient/caregiver able to teach back signs and symptoms related to disease process for when to call 911? Yes   Is the patient/caregiver able to teach back the hierarchy of who to call/visit for symptoms/problems? PCP, Specialist, Home health nurse, Urgent Care, ED, 911 Yes   Week 1 call completed? Yes   Wrap up additional comments Unable to reach pt,  Pt's mother provided updates to the best of her ability   Call end time 1050            Linda GREEN - Registered Nurse

## 2024-12-18 NOTE — DISCHARGE SUMMARY
"             [facility] Medicine Services  Discharge Summary    Date of Service: 24  Patient Name: Colten Yanez Jr.  : 1965  MRN: [id number]    Date of Admission: 2024  Discharge Diagnosis:   DKA - Resolved  AMS- due to DKA - resolved  DM2 - Evaluated per endo, dc on meds per their reccs   Depression - evaluated per psych      Date of Discharge:  24  Primary Care Physician: Provider, No Known      Presenting Problem:   Rhinovirus infection [B34.8]  Hypothermia, initial encounter [T68.XXXA]  Diabetic ketoacidosis with coma associated with type 1 diabetes mellitus [E10.11]  Leukocytosis, unspecified type [D72.829]  DKA (diabetic ketoacidosis) [E11.10]    Active and Resolved Hospital Problems:  Active Hospital Problems    Diagnosis POA    **DKA (diabetic ketoacidosis) [E11.10] Yes    Dyslipidemia [E78.5] Yes    Vitamin D deficiency [E55.9] Yes    Status post coronary artery stent placement [Z95.5] Not Applicable    Type 1 diabetes mellitus with hyperglycemia [E10.65] Yes      Resolved Hospital Problems   No resolved problems to display.         Hospital Course     HPI:  Per the H&P written by Katrin Canela APRN , dated 24:  \"DKA\"    Hospital Course:  Colten Yanez Jr. is a 59 y.o. male with PMH of Diabetes mellitus type 1.5, dyslipidemia, vitamin D deficiency, CAD with history of stent placement presented to the hospital after being found down at home, and was admitted with a principal diagnosis of DKA (diabetic ketoacidosis).     Treated for DKA - resolved. Endo was consulted , meds adjusted per endo reccs on discharge. Found to have bactremia - ID was consulted, dced on PO levaquin per their reccs. Depression - psych was consulted given he also had past SI. All consultants cleared for discharge.     He was found to have slightly elevated in AST/ALT, his statins were held, patient was offered to stay one day to monitor his liver enzymes, however he stated he would like to be " discharge and will follow up with his PCP, patient counseled not to take Statins until he has liver enzymes checked, Recommended patient to avoid ETOH use altogether.     Patient is currently clinically stable. Patient evaluated per PT and is recommended to Home . Plan for discharge discussed with the patient prior to discharge, Patient agreed with the plan. Patient advised to return to hospital or go near by hospital or call 911, should patient's symptoms worsen or recur. Patient also advised to follow up with PCP within 1-5 days for recent hospitalization, monitoring and further management of patient's health problems. Patient acknowledged understanding and agreed with the discharge plan.     DISCHARGE Follow Up Recommendations for labs and diagnostics:     Follow up PCP for recent hospitalizatin, monitoring of your liver enzymes, do not take statins until you are cleared to do per your PCP     Follow up with your endocrinologist, for further adjustment of your insulin regimen       Reasons For Change In Medications and Indications for New Medications:      Day of Discharge     Vital Signs:       Constitutional:       Appearance: Normal appearance.   HENT:      Head: Normocephalic and atraumatic.      Nose: Nose normal.      Mouth/Throat:      Mouth: Mucous membranes are moist.   Eyes:      Extraocular Movements: Extraocular movements intact.      Conjunctiva/sclera: Conjunctivae normal.      Pupils: Pupils are equal, round, and reactive to light.   Cardiovascular:      Rate and Rhythm: Normal rate and regular rhythm.      Pulses: Normal pulses.      Heart sounds: Normal heart sounds.   Pulmonary:      Effort: Pulmonary effort is normal.      Breath sounds: Normal breath sounds.   Abdominal:      General: Abdomen is flat. Bowel sounds are normal.      Palpations: Abdomen is soft.   Musculoskeletal:         General: Normal range of motion.      Cervical back: Normal range of motion and neck supple.   Skin:      General: Skin is warm and dry.      Capillary Refill: Capillary refill takes less than 2 seconds.         Pertinent  and/or Most Recent Results     LAB RESULTS:      Lab 12/16/24  0247 12/15/24  0233 12/14/24  0318 12/13/24  0537 12/12/24  0342   WBC 7.27 6.40 5.96 6.04 8.18   HEMOGLOBIN 11.7* 11.9* 12.0* 13.2 11.8*   HEMATOCRIT 34.2* 35.5* 35.9* 38.2 33.2*   PLATELETS 251 174 169 153 162   NEUTROS ABS 3.97 3.64 3.27 3.07 5.74   IMMATURE GRANS (ABS) 0.08* 0.03 0.02 0.02 0.02   LYMPHS ABS 2.12 1.91 2.00 2.26 1.77   MONOS ABS 0.96* 0.70 0.61 0.64 0.64   EOS ABS 0.10 0.09 0.04 0.03 0.00   MCV 97.4* 97.8* 96.2 97.0 94.1         Lab 12/16/24  0247 12/15/24  0233 12/14/24  0318 12/13/24  1534 12/13/24  0822 12/13/24  0438 12/13/24  0046 12/12/24  1444   SODIUM 135* 136 133* 137  --  142  --  148*   POTASSIUM 4.1 4.4 4.9 3.6  --  3.9 4.5 3.6   CHLORIDE 100 100 99 102  --  104  --  111*   CO2 25.7 28.8 26.9 28.5  --  27.5  --  28.3   ANION GAP 9.3 7.2 7.1 6.5  --  10.5  --  8.7   BUN 14 19 17 14  --  14  --  15   CREATININE 1.10 1.08 0.94 0.75*  --  0.81  --  0.82   EGFR 78.3 80.0 94.6 105.3  --  102.8  --  102.5   GLUCOSE 243* 249* 298* 69  --  113*  --  68   CALCIUM 8.8 9.3 9.4 9.5  --  10.0  --  9.6   MAGNESIUM 2.2  --  2.1 2.0  --  2.3  --  2.3   PHOSPHORUS 2.9  --  2.9  --  3.2 3.6 2.2* 1.8*         Lab 12/16/24  0247 12/14/24  0318 12/13/24  0438 12/12/24  0342   TOTAL PROTEIN 5.9* 5.8* 6.9 5.5*   ALBUMIN 3.5 3.2* 4.0 3.3*   GLOBULIN 2.4 2.6 2.9 2.2   ALT (SGPT) 87* 19 24 21   AST (SGOT) 135* 30 35 44*   BILIRUBIN 0.2 0.3 0.5 0.3   ALK PHOS 97 101 117 98                     Brief Urine Lab Results  (Last result in the past 365 days)        Color   Clarity   Blood   Leuk Est   Nitrite   Protein   CREAT   Urine HCG        12/09/24 1719 Yellow   Clear   Small (1+)   Negative   Negative   100 mg/dL (2+)                 Microbiology Results (last 10 days)       Procedure Component Value - Date/Time    Blood Culture -  Blood, Arm, Left [[id number]]  (Normal) Collected: 12/15/24 0233    Lab Status: Preliminary result Specimen: Blood from Arm, Left Updated: 12/18/24 0245     Blood Culture No growth at 3 days    Blood Culture - Blood, Arm, Left [[id number]]  (Normal) Collected: 12/14/24 1825    Lab Status: Preliminary result Specimen: Blood from Arm, Left Updated: 12/17/24 1845     Blood Culture No growth at 3 days    Narrative:      Less than seven (7) mL's of blood was collected.  Insufficient quantity may yield false negative results.    MRSA Screen, PCR (Inpatient) - Swab, Nares [[id number]]  (Normal) Collected: 12/09/24 2120    Lab Status: Final result Specimen: Swab from Nares Updated: 12/09/24 2306     MRSA PCR No MRSA Detected    Narrative:      The negative predictive value of this diagnostic test is high and should only be used to consider de-escalating anti-MRSA therapy. A positive result may indicate colonization with MRSA and must be correlated clinically.    Blood Culture - Blood, Arm, Right [[id number]]  (Normal) Collected: 12/09/24 1720    Lab Status: Final result Specimen: Blood from Arm, Right Updated: 12/14/24 1732     Blood Culture No growth at 5 days    Respiratory Panel PCR w/COVID-19(SARS-CoV-2) ION/ARLETH/TIFFANIE/PAD/COR/NUNO In-House, NP Swab in UTM/VTM, 2 HR TAT - Swab, Nasopharynx [[id number]]  (Abnormal) Collected: 12/09/24 1718    Lab Status: Final result Specimen: Swab from Nasopharynx Updated: 12/09/24 1916     ADENOVIRUS, PCR Not Detected     Coronavirus 229E Not Detected     Coronavirus HKU1 Not Detected     Coronavirus NL63 Not Detected     Coronavirus OC43 Not Detected     COVID19 Not Detected     Human Metapneumovirus Not Detected     Human Rhinovirus/Enterovirus Detected     Influenza A PCR Not Detected     Influenza B PCR Not Detected     Parainfluenza Virus 1 Not Detected     Parainfluenza Virus 2 Not Detected     Parainfluenza Virus 3 Not Detected     Parainfluenza Virus 4 Not Detected     RSV, PCR Not  Detected     Bordetella pertussis pcr Not Detected     Bordetella parapertussis PCR Not Detected     Chlamydophila pneumoniae PCR Not Detected     Mycoplasma pneumo by PCR Not Detected    Narrative:      In the setting of a positive respiratory panel with a viral infection PLUS a negative procalcitonin without other underlying concern for bacterial infection, consider observing off antibiotics or discontinuation of antibiotics and continue supportive care. If the respiratory panel is positive for atypical bacterial infection (Bordetella pertussis, Chlamydophila pneumoniae, or Mycoplasma pneumoniae), consider antibiotic de-escalation to target atypical bacterial infection.    Blood Culture - Blood, Arm, Left [[id number]]  (Abnormal)  (Susceptibility) Collected: 12/09/24 4801    Lab Status: Final result Specimen: Blood from Arm, Left Updated: 12/13/24 0610     Blood Culture Klebsiella oxytoca     Isolated from Anaerobic Bottle     Blood Culture Escherichia coli     Isolated from Aerobic Bottle     Gram Stain Anaerobic Bottle Gram negative bacilli      Aerobic Bottle Gram negative bacilli    Narrative:          Less than seven (7) mL's of blood was collected.  Insufficient quantity may yield false negative results.    Susceptibility        Klebsiella oxytoca      SHWETA      Amoxicillin + Clavulanate Susceptible      Ampicillin Resistant      Ampicillin + Sulbactam Susceptible      Cefazolin (Non Urine) Resistant      Cefepime Susceptible      Ceftazidime Susceptible      Ceftriaxone Susceptible      Gentamicin Susceptible      Levofloxacin Susceptible      Piperacillin + Tazobactam Susceptible      Trimethoprim + Sulfamethoxazole Susceptible                       Susceptibility        Escherichia coli      SHWETA      Amoxicillin + Clavulanate Susceptible      Ampicillin Susceptible      Ampicillin + Sulbactam Susceptible      Cefazolin (Non Urine) Susceptible      Cefepime Susceptible      Ceftazidime Susceptible       Ceftriaxone Susceptible      Gentamicin Susceptible      Levofloxacin Susceptible      Piperacillin + Tazobactam Susceptible      Trimethoprim + Sulfamethoxazole Susceptible                       Susceptibility Comments       Klebsiella oxytoca    With the exception of urinary-sourced infections, aminoglycosides should not be used as monotherapy.      Escherichia coli    With the exception of urinary-sourced infections, aminoglycosides should not be used as monotherapy.               Blood Culture ID, PCR - Blood, Arm, Left [[id number]]  (Abnormal) Collected: 12/09/24 1717    Lab Status: Final result Specimen: Blood from Arm, Left Updated: 12/10/24 0937     BCID, PCR Klebsiella oxytoca. Identification by BCID2 PCR     BOTTLE TYPE Anaerobic Bottle    Narrative:      No resistance genes detected.            CT Abdomen Pelvis Stone Protocol    Result Date: 12/12/2024  Impression: Impression: 1.Examination is limited due to lack of IV contrast administration. 2.No acute abnormality identified within the abdomen or pelvis. 3.No hydronephrosis or urinary tract calculi identified. 4.Mild air within the bladder and likely within the prostatic urethra. This may be related to recent instrumentation. Please correlate with urinalysis to exclude urinary tract infection. 5.Additional findings as detailed above. Electronically Signed: Ghanshyam Muller MD  12/12/2024 11:30 AM EST  Workstation ID: [id number]    CT Head Without Contrast    Result Date: 12/12/2024  Impression: Impression: 1. No acute intracranial process. 2. Left maxillary sinusitis Electronically Signed: Cecil Swan  12/12/2024 11:25 AM EST  Workstation ID: [id number]    XR Abdomen KUB    Result Date: 12/11/2024  Impression: Impression: Radiopaque tip of the enteric tube extends to the distal gastric body level Electronically Signed: Magnolia Cole MD  12/11/2024 11:15 AM EST  Workstation ID: [id number]    CT Head Without Contrast    Result Date: 12/9/2024  Impression:  Impression: 1.No acute intracranial process identified. Please note that there is a degree of motion artifact which limits image quality. Electronically Signed: Moses Gibson MD  12/9/2024 6:08 PM EST  Workstation ID: [id number]    XR Chest 1 View    Result Date: 12/9/2024  Impression: Impression: No acute process identified Electronically Signed: Moses Gibson MD  12/9/2024 6:04 PM EST  Workstation ID: [id number]                 Labs Pending at Discharge:  Pending Results       None            Procedures Performed           Consults:   Consults       Date and Time Order Name Status Description    12/15/2024 10:00 AM Inpatient Psychiatrist Consult Completed     12/14/2024  6:07 PM Inpatient Infectious Diseases Consult Completed               Discharge Details        Discharge Medications        New Medications        Instructions Start Date   escitalopram 10 MG tablet  Commonly known as: Lexapro   10 mg, Oral, Daily      levoFLOXacin 750 MG tablet  Commonly known as: LEVAQUIN   750 mg, Oral, Every 24 Hours      NovoLIN 70/30 FlexPen Relion (70-30) 100 UNIT/ML suspension pen-injector  Generic drug: Insulin NPH Isophane & Regular   Inject 14 Units under the skin into the appropriate area as directed 3 (Three) Times a Day 30 minutes Before Meals for 30 days.      Vitamin B1 100 MG tablet tablet   100 mg, Oral, Daily             Changes to Medications        Instructions Start Date   Dexcom G7 Sensor misc  What changed: Another medication with the same name was added. Make sure you understand how and when to take each.   1 Device, Not Applicable, Every 10 Days      Dexcom G7 Sensor misc  What changed: You were already taking a medication with the same name, and this prescription was added. Make sure you understand how and when to take each.   1 each, Not Applicable, Every 10 Days, Dx: 10.65      glucose blood test strip  What changed: Another medication with the same name was added. Make sure you understand how and when to  take each.   To check blood sugar daily.      Accu-Chek Guide Test test strip  Generic drug: glucose blood  What changed: You were already taking a medication with the same name, and this prescription was added. Make sure you understand how and when to take each.   1 each, Other, 4 Times Daily Before Meals & Nightly, Dx: E10.65. Use as instructed      Insulin Pen Needle 32G X 6 MM misc  Commonly known as: NovoFine  What changed: Another medication with the same name was added. Make sure you understand how and when to take each.   Use with insulin pen 4x/d      ReliOn Pen Needles 32G X 4 MM misc  Generic drug: Insulin Pen Needle  What changed: You were already taking a medication with the same name, and this prescription was added. Make sure you understand how and when to take each.   1 each, Not Applicable, 3 times daily             Continue These Medications        Instructions Start Date   Accu-Chek FastClix Lancets misc   TEST BLOOD GLUCOSE FOUR TIMES DAILY AS DIRECTED      Accu-Chek Guide w/Device kit   TEST FOUR TIMES DAILY AS DIRECTED      aspirin 81 MG EC tablet   ASPIRIN 81 TBEC      B-D SINGLE USE SWABS REGULAR pads   TEST BLOOD GLUCOSE FOUR TIMES DAILY AS DIRECTED      Baqsimi One Pack 3 MG/DOSE powder  Generic drug: Glucagon   3 mg, Nasal, As Needed      carvedilol 3.125 MG tablet  Commonly known as: COREG   TAKE 1 TABLET BY MOUTH TWICE A DAY      Dexcom G6  device   1 each, Not Applicable, Continuous, Pt to use to monitor his blood sugars      vitamin D 1.25 MG (59136 UT) capsule capsule  Commonly known as: ERGOCALCIFEROL   TAKE 1 CAPSULE BY MOUTH ONE TIME PER WEEK             Stop These Medications      atorvastatin 10 MG tablet  Commonly known as: LIPITOR     CLARITIN PO     NovoLOG FlexPen 100 UNIT/ML solution pen-injector sc pen  Generic drug: insulin aspart     Tresiba FlexTouch 100 UNIT/ML solution pen-injector injection  Generic drug: insulin degludec              No Known  Allergies      Discharge Disposition:   Home or Self Care    Diet:  Hospital:No active diet order        Discharge Activity:         CODE STATUS:  Code Status and Medical Interventions: CPR (Attempt to Resuscitate); Full Support   Ordered at: 12/09/24 2111     Code Status (Patient has no pulse and is not breathing):    CPR (Attempt to Resuscitate)     Medical Interventions (Patient has pulse or is breathing):    Full Support         No future appointments.        Time spent on Discharge including face to face service:  >30 minutes    Part of this note may be an electronic transcription/translation of spoken language to printed text using the Dragon Dictation System.    Signature: Electronically signed by Ronaldo Parisi MD, 12/18/24, 10:46 EST.  [facility] Hospitalist Team

## 2024-12-19 LAB — BACTERIA SPEC AEROBE CULT: NORMAL

## 2024-12-20 LAB — BACTERIA SPEC AEROBE CULT: NORMAL
